# Patient Record
Sex: FEMALE | Race: WHITE | URBAN - METROPOLITAN AREA
[De-identification: names, ages, dates, MRNs, and addresses within clinical notes are randomized per-mention and may not be internally consistent; named-entity substitution may affect disease eponyms.]

---

## 2020-08-24 ENCOUNTER — RECORDS - HEALTHEAST (OUTPATIENT)
Dept: LAB | Facility: CLINIC | Age: 70
End: 2020-08-24

## 2020-08-25 LAB
ANION GAP SERPL CALCULATED.3IONS-SCNC: 12 MMOL/L (ref 5–18)
BUN SERPL-MCNC: 39 MG/DL (ref 8–28)
CALCIUM SERPL-MCNC: 8.6 MG/DL (ref 8.5–10.5)
CHLORIDE BLD-SCNC: 105 MMOL/L (ref 98–107)
CO2 SERPL-SCNC: 19 MMOL/L (ref 22–31)
CREAT SERPL-MCNC: 1.1 MG/DL (ref 0.6–1.1)
ERYTHROCYTE [DISTWIDTH] IN BLOOD BY AUTOMATED COUNT: 13.9 % (ref 11–14.5)
GFR SERPL CREATININE-BSD FRML MDRD: 49 ML/MIN/1.73M2
GLUCOSE BLD-MCNC: 193 MG/DL (ref 70–125)
HCT VFR BLD AUTO: 36.5 % (ref 35–47)
HGB BLD-MCNC: 12.1 G/DL (ref 12–16)
MCH RBC QN AUTO: 30.9 PG (ref 27–34)
MCHC RBC AUTO-ENTMCNC: 33.2 G/DL (ref 32–36)
MCV RBC AUTO: 93 FL (ref 80–100)
PLATELET # BLD AUTO: 186 THOU/UL (ref 140–440)
PMV BLD AUTO: 11.5 FL (ref 8.5–12.5)
POTASSIUM BLD-SCNC: 4 MMOL/L (ref 3.5–5)
RBC # BLD AUTO: 3.92 MILL/UL (ref 3.8–5.4)
SODIUM SERPL-SCNC: 136 MMOL/L (ref 136–145)
WBC: 10.4 THOU/UL (ref 4–11)

## 2020-09-09 ENCOUNTER — RECORDS - HEALTHEAST (OUTPATIENT)
Dept: LAB | Facility: CLINIC | Age: 70
End: 2020-09-09

## 2020-09-10 LAB
ANION GAP SERPL CALCULATED.3IONS-SCNC: 11 MMOL/L (ref 5–18)
BUN SERPL-MCNC: 43 MG/DL (ref 8–28)
CALCIUM SERPL-MCNC: 9.1 MG/DL (ref 8.5–10.5)
CHLORIDE BLD-SCNC: 108 MMOL/L (ref 98–107)
CO2 SERPL-SCNC: 18 MMOL/L (ref 22–31)
CREAT SERPL-MCNC: 1.09 MG/DL (ref 0.6–1.1)
ERYTHROCYTE [DISTWIDTH] IN BLOOD BY AUTOMATED COUNT: 14.6 % (ref 11–14.5)
GFR SERPL CREATININE-BSD FRML MDRD: 49 ML/MIN/1.73M2
GLUCOSE BLD-MCNC: 130 MG/DL (ref 70–125)
HCT VFR BLD AUTO: 34.8 % (ref 35–47)
HGB BLD-MCNC: 11.3 G/DL (ref 12–16)
MCH RBC QN AUTO: 31.4 PG (ref 27–34)
MCHC RBC AUTO-ENTMCNC: 32.5 G/DL (ref 32–36)
MCV RBC AUTO: 97 FL (ref 80–100)
PLATELET # BLD AUTO: 178 THOU/UL (ref 140–440)
PMV BLD AUTO: 11.2 FL (ref 8.5–12.5)
POTASSIUM BLD-SCNC: 4.6 MMOL/L (ref 3.5–5)
RBC # BLD AUTO: 3.6 MILL/UL (ref 3.8–5.4)
SODIUM SERPL-SCNC: 137 MMOL/L (ref 136–145)
WBC: 6.2 THOU/UL (ref 4–11)

## 2020-09-16 ENCOUNTER — RECORDS - HEALTHEAST (OUTPATIENT)
Dept: LAB | Facility: CLINIC | Age: 70
End: 2020-09-16

## 2020-09-17 LAB
ANION GAP SERPL CALCULATED.3IONS-SCNC: 9 MMOL/L (ref 5–18)
BUN SERPL-MCNC: 35 MG/DL (ref 8–28)
CALCIUM SERPL-MCNC: 8.4 MG/DL (ref 8.5–10.5)
CHLORIDE BLD-SCNC: 110 MMOL/L (ref 98–107)
CO2 SERPL-SCNC: 22 MMOL/L (ref 22–31)
CREAT SERPL-MCNC: 0.79 MG/DL (ref 0.6–1.1)
GFR SERPL CREATININE-BSD FRML MDRD: >60 ML/MIN/1.73M2
GLUCOSE BLD-MCNC: 100 MG/DL (ref 70–125)
HGB BLD-MCNC: 9.4 G/DL (ref 12–16)
PLATELET # BLD AUTO: 152 THOU/UL (ref 140–440)
POTASSIUM BLD-SCNC: 4.3 MMOL/L (ref 3.5–5)
SODIUM SERPL-SCNC: 141 MMOL/L (ref 136–145)

## 2020-09-23 ENCOUNTER — RECORDS - HEALTHEAST (OUTPATIENT)
Dept: LAB | Facility: CLINIC | Age: 70
End: 2020-09-23

## 2020-09-24 LAB
ANION GAP SERPL CALCULATED.3IONS-SCNC: 12 MMOL/L (ref 5–18)
BUN SERPL-MCNC: 21 MG/DL (ref 8–28)
CALCIUM SERPL-MCNC: 8.7 MG/DL (ref 8.5–10.5)
CHLORIDE BLD-SCNC: 107 MMOL/L (ref 98–107)
CO2 SERPL-SCNC: 21 MMOL/L (ref 22–31)
CREAT SERPL-MCNC: 1.08 MG/DL (ref 0.6–1.1)
GFR SERPL CREATININE-BSD FRML MDRD: 50 ML/MIN/1.73M2
GLUCOSE BLD-MCNC: 123 MG/DL (ref 70–125)
HGB BLD-MCNC: 10.4 G/DL (ref 12–16)
POTASSIUM BLD-SCNC: 4.2 MMOL/L (ref 3.5–5)
SODIUM SERPL-SCNC: 140 MMOL/L (ref 136–145)

## 2022-07-13 ENCOUNTER — LAB REQUISITION (OUTPATIENT)
Dept: LAB | Facility: CLINIC | Age: 72
End: 2022-07-13
Payer: COMMERCIAL

## 2022-07-13 DIAGNOSIS — E11.69 TYPE 2 DIABETES MELLITUS WITH OTHER SPECIFIED COMPLICATION (H): ICD-10-CM

## 2022-07-13 DIAGNOSIS — M17.0 BILATERAL PRIMARY OSTEOARTHRITIS OF KNEE: ICD-10-CM

## 2022-07-14 LAB
ANION GAP SERPL CALCULATED.3IONS-SCNC: 17 MMOL/L (ref 7–15)
BUN SERPL-MCNC: 37.3 MG/DL (ref 8–23)
CALCIUM SERPL-MCNC: 8.9 MG/DL (ref 8.8–10.2)
CHLORIDE SERPL-SCNC: 101 MMOL/L (ref 98–107)
CREAT SERPL-MCNC: 0.92 MG/DL (ref 0.51–0.95)
DEPRECATED HCO3 PLAS-SCNC: 20 MMOL/L (ref 22–29)
GFR SERPL CREATININE-BSD FRML MDRD: 65 ML/MIN/1.73M2
GLUCOSE SERPL-MCNC: 168 MG/DL (ref 70–99)
HBA1C MFR BLD: 7.3 %
NT-PROBNP SERPL-MCNC: 39 PG/ML (ref 0–125)
POTASSIUM SERPL-SCNC: 4.1 MMOL/L (ref 3.4–5.3)
SODIUM SERPL-SCNC: 138 MMOL/L (ref 136–145)
URATE SERPL-MCNC: 6.7 MG/DL (ref 2.4–5.7)

## 2022-07-14 PROCEDURE — 36415 COLL VENOUS BLD VENIPUNCTURE: CPT | Performed by: FAMILY MEDICINE

## 2022-07-14 PROCEDURE — 83880 ASSAY OF NATRIURETIC PEPTIDE: CPT | Performed by: FAMILY MEDICINE

## 2022-07-14 PROCEDURE — P9604 ONE-WAY ALLOW PRORATED TRIP: HCPCS | Performed by: FAMILY MEDICINE

## 2022-07-14 PROCEDURE — 84550 ASSAY OF BLOOD/URIC ACID: CPT | Performed by: FAMILY MEDICINE

## 2022-07-14 PROCEDURE — 83036 HEMOGLOBIN GLYCOSYLATED A1C: CPT | Performed by: FAMILY MEDICINE

## 2022-07-14 PROCEDURE — 82310 ASSAY OF CALCIUM: CPT | Performed by: FAMILY MEDICINE

## 2022-11-12 ENCOUNTER — LAB REQUISITION (OUTPATIENT)
Dept: LAB | Facility: CLINIC | Age: 72
End: 2022-11-12
Payer: COMMERCIAL

## 2022-11-12 DIAGNOSIS — I10 ESSENTIAL (PRIMARY) HYPERTENSION: ICD-10-CM

## 2022-11-14 LAB
ANION GAP SERPL CALCULATED.3IONS-SCNC: 16 MMOL/L (ref 7–15)
BUN SERPL-MCNC: 33.7 MG/DL (ref 8–23)
CALCIUM SERPL-MCNC: 9.1 MG/DL (ref 8.8–10.2)
CHLORIDE SERPL-SCNC: 99 MMOL/L (ref 98–107)
CREAT SERPL-MCNC: 1.02 MG/DL (ref 0.51–0.95)
DEPRECATED HCO3 PLAS-SCNC: 21 MMOL/L (ref 22–29)
ERYTHROCYTE [DISTWIDTH] IN BLOOD BY AUTOMATED COUNT: 13.5 % (ref 10–15)
GFR SERPL CREATININE-BSD FRML MDRD: 58 ML/MIN/1.73M2
GLUCOSE SERPL-MCNC: 268 MG/DL (ref 70–99)
HBA1C MFR BLD: 7.6 %
HCT VFR BLD AUTO: 38.2 % (ref 35–47)
HGB BLD-MCNC: 12 G/DL (ref 11.7–15.7)
MCH RBC QN AUTO: 30.9 PG (ref 26.5–33)
MCHC RBC AUTO-ENTMCNC: 31.4 G/DL (ref 31.5–36.5)
MCV RBC AUTO: 99 FL (ref 78–100)
PLATELET # BLD AUTO: 188 10E3/UL (ref 150–450)
POTASSIUM SERPL-SCNC: 4.7 MMOL/L (ref 3.4–5.3)
RBC # BLD AUTO: 3.88 10E6/UL (ref 3.8–5.2)
SODIUM SERPL-SCNC: 136 MMOL/L (ref 136–145)
TSH SERPL DL<=0.005 MIU/L-ACNC: 7.18 UIU/ML (ref 0.3–4.2)
WBC # BLD AUTO: 10.2 10E3/UL (ref 4–11)

## 2022-11-14 PROCEDURE — P9604 ONE-WAY ALLOW PRORATED TRIP: HCPCS | Mod: ORL | Performed by: NURSE PRACTITIONER

## 2022-11-14 PROCEDURE — 80048 BASIC METABOLIC PNL TOTAL CA: CPT | Mod: ORL | Performed by: NURSE PRACTITIONER

## 2022-11-14 PROCEDURE — 84443 ASSAY THYROID STIM HORMONE: CPT | Mod: ORL | Performed by: NURSE PRACTITIONER

## 2022-11-14 PROCEDURE — 36415 COLL VENOUS BLD VENIPUNCTURE: CPT | Mod: ORL | Performed by: NURSE PRACTITIONER

## 2022-11-14 PROCEDURE — 85027 COMPLETE CBC AUTOMATED: CPT | Mod: ORL | Performed by: NURSE PRACTITIONER

## 2022-11-14 PROCEDURE — 83036 HEMOGLOBIN GLYCOSYLATED A1C: CPT | Mod: ORL | Performed by: NURSE PRACTITIONER

## 2023-01-03 ENCOUNTER — LAB REQUISITION (OUTPATIENT)
Dept: LAB | Facility: CLINIC | Age: 73
End: 2023-01-03
Payer: COMMERCIAL

## 2023-01-03 DIAGNOSIS — E03.9 HYPOTHYROIDISM, UNSPECIFIED: ICD-10-CM

## 2023-01-04 LAB — TSH SERPL DL<=0.005 MIU/L-ACNC: 4.79 UIU/ML (ref 0.3–4.2)

## 2023-01-04 PROCEDURE — 84443 ASSAY THYROID STIM HORMONE: CPT | Mod: ORL | Performed by: NURSE PRACTITIONER

## 2023-01-04 PROCEDURE — P9604 ONE-WAY ALLOW PRORATED TRIP: HCPCS | Mod: ORL | Performed by: NURSE PRACTITIONER

## 2023-01-04 PROCEDURE — 36415 COLL VENOUS BLD VENIPUNCTURE: CPT | Mod: ORL | Performed by: NURSE PRACTITIONER

## 2023-01-20 ENCOUNTER — LAB REQUISITION (OUTPATIENT)
Dept: LAB | Facility: CLINIC | Age: 73
End: 2023-01-20
Payer: COMMERCIAL

## 2023-01-20 DIAGNOSIS — I10 ESSENTIAL (PRIMARY) HYPERTENSION: ICD-10-CM

## 2023-01-23 LAB
ANION GAP SERPL CALCULATED.3IONS-SCNC: 16 MMOL/L (ref 7–15)
BUN SERPL-MCNC: 29.7 MG/DL (ref 8–23)
CALCIUM SERPL-MCNC: 9.3 MG/DL (ref 8.8–10.2)
CHLORIDE SERPL-SCNC: 102 MMOL/L (ref 98–107)
CREAT SERPL-MCNC: 0.84 MG/DL (ref 0.51–0.95)
DEPRECATED HCO3 PLAS-SCNC: 20 MMOL/L (ref 22–29)
GFR SERPL CREATININE-BSD FRML MDRD: 73 ML/MIN/1.73M2
GLUCOSE SERPL-MCNC: 186 MG/DL (ref 70–99)
POTASSIUM SERPL-SCNC: 4.6 MMOL/L (ref 3.4–5.3)
SODIUM SERPL-SCNC: 138 MMOL/L (ref 136–145)

## 2023-01-23 PROCEDURE — P9604 ONE-WAY ALLOW PRORATED TRIP: HCPCS | Mod: ORL | Performed by: NURSE PRACTITIONER

## 2023-01-23 PROCEDURE — 80048 BASIC METABOLIC PNL TOTAL CA: CPT | Mod: ORL | Performed by: NURSE PRACTITIONER

## 2023-01-23 PROCEDURE — 36415 COLL VENOUS BLD VENIPUNCTURE: CPT | Mod: ORL | Performed by: NURSE PRACTITIONER

## 2023-03-30 ENCOUNTER — LAB REQUISITION (OUTPATIENT)
Dept: LAB | Facility: CLINIC | Age: 73
End: 2023-03-30
Payer: COMMERCIAL

## 2023-03-30 DIAGNOSIS — R19.7 DIARRHEA, UNSPECIFIED: ICD-10-CM

## 2023-03-30 PROCEDURE — 87798 DETECT AGENT NOS DNA AMP: CPT | Mod: ORL | Performed by: NURSE PRACTITIONER

## 2023-03-30 PROCEDURE — 87493 C DIFF AMPLIFIED PROBE: CPT | Mod: ORL | Performed by: NURSE PRACTITIONER

## 2023-03-31 ENCOUNTER — LAB REQUISITION (OUTPATIENT)
Dept: LAB | Facility: CLINIC | Age: 73
End: 2023-03-31
Payer: COMMERCIAL

## 2023-03-31 DIAGNOSIS — R19.7 DIARRHEA, UNSPECIFIED: ICD-10-CM

## 2023-03-31 LAB
C DIFF TOX B STL QL: NEGATIVE
NOROV GI+II ORF1-ORF2 JNC STL QL NAA+PR: NOT DETECTED

## 2023-04-03 LAB
ANION GAP SERPL CALCULATED.3IONS-SCNC: 14 MMOL/L (ref 7–15)
BUN SERPL-MCNC: 29.9 MG/DL (ref 8–23)
CALCIUM SERPL-MCNC: 9 MG/DL (ref 8.8–10.2)
CHLORIDE SERPL-SCNC: 106 MMOL/L (ref 98–107)
CREAT SERPL-MCNC: 0.97 MG/DL (ref 0.51–0.95)
DEPRECATED HCO3 PLAS-SCNC: 21 MMOL/L (ref 22–29)
ERYTHROCYTE [DISTWIDTH] IN BLOOD BY AUTOMATED COUNT: 13.9 % (ref 10–15)
GFR SERPL CREATININE-BSD FRML MDRD: 61 ML/MIN/1.73M2
GLUCOSE SERPL-MCNC: 233 MG/DL (ref 70–99)
HCT VFR BLD AUTO: 37.5 % (ref 35–47)
HGB BLD-MCNC: 12.1 G/DL (ref 11.7–15.7)
MCH RBC QN AUTO: 30.9 PG (ref 26.5–33)
MCHC RBC AUTO-ENTMCNC: 32.3 G/DL (ref 31.5–36.5)
MCV RBC AUTO: 96 FL (ref 78–100)
PLATELET # BLD AUTO: 188 10E3/UL (ref 150–450)
POTASSIUM SERPL-SCNC: 4.4 MMOL/L (ref 3.4–5.3)
RBC # BLD AUTO: 3.91 10E6/UL (ref 3.8–5.2)
SODIUM SERPL-SCNC: 141 MMOL/L (ref 136–145)
WBC # BLD AUTO: 8.4 10E3/UL (ref 4–11)

## 2023-04-03 PROCEDURE — 36415 COLL VENOUS BLD VENIPUNCTURE: CPT | Mod: ORL | Performed by: NURSE PRACTITIONER

## 2023-04-03 PROCEDURE — P9604 ONE-WAY ALLOW PRORATED TRIP: HCPCS | Mod: ORL | Performed by: NURSE PRACTITIONER

## 2023-04-03 PROCEDURE — 80048 BASIC METABOLIC PNL TOTAL CA: CPT | Mod: ORL | Performed by: NURSE PRACTITIONER

## 2023-04-03 PROCEDURE — 85027 COMPLETE CBC AUTOMATED: CPT | Mod: ORL | Performed by: NURSE PRACTITIONER

## 2023-07-21 ENCOUNTER — LAB REQUISITION (OUTPATIENT)
Dept: LAB | Facility: CLINIC | Age: 73
End: 2023-07-21
Payer: COMMERCIAL

## 2023-07-21 DIAGNOSIS — E11.9 TYPE 2 DIABETES MELLITUS WITHOUT COMPLICATIONS (H): ICD-10-CM

## 2023-07-24 LAB — HBA1C MFR BLD: 8.6 %

## 2023-07-24 PROCEDURE — 83036 HEMOGLOBIN GLYCOSYLATED A1C: CPT | Mod: ORL | Performed by: NURSE PRACTITIONER

## 2023-07-24 PROCEDURE — 36415 COLL VENOUS BLD VENIPUNCTURE: CPT | Mod: ORL | Performed by: NURSE PRACTITIONER

## 2023-07-24 PROCEDURE — P9604 ONE-WAY ALLOW PRORATED TRIP: HCPCS | Mod: ORL | Performed by: NURSE PRACTITIONER

## 2023-12-20 ENCOUNTER — LAB REQUISITION (OUTPATIENT)
Dept: LAB | Facility: CLINIC | Age: 73
End: 2023-12-20
Payer: COMMERCIAL

## 2023-12-20 DIAGNOSIS — E11.9 TYPE 2 DIABETES MELLITUS WITHOUT COMPLICATIONS (H): ICD-10-CM

## 2023-12-20 DIAGNOSIS — I10 ESSENTIAL (PRIMARY) HYPERTENSION: ICD-10-CM

## 2023-12-21 LAB
ANION GAP SERPL CALCULATED.3IONS-SCNC: 11 MMOL/L (ref 7–15)
BUN SERPL-MCNC: 43.1 MG/DL (ref 8–23)
CALCIUM SERPL-MCNC: 9.3 MG/DL (ref 8.8–10.2)
CHLORIDE SERPL-SCNC: 106 MMOL/L (ref 98–107)
CREAT SERPL-MCNC: 1 MG/DL (ref 0.51–0.95)
DEPRECATED HCO3 PLAS-SCNC: 22 MMOL/L (ref 22–29)
EGFRCR SERPLBLD CKD-EPI 2021: 59 ML/MIN/1.73M2
ERYTHROCYTE [DISTWIDTH] IN BLOOD BY AUTOMATED COUNT: 14.4 % (ref 10–15)
GLUCOSE SERPL-MCNC: 191 MG/DL (ref 70–99)
HBA1C MFR BLD: 9.4 %
HCT VFR BLD AUTO: 34.6 % (ref 35–47)
HGB BLD-MCNC: 11 G/DL (ref 11.7–15.7)
MCH RBC QN AUTO: 31.6 PG (ref 26.5–33)
MCHC RBC AUTO-ENTMCNC: 31.8 G/DL (ref 31.5–36.5)
MCV RBC AUTO: 99 FL (ref 78–100)
NT-PROBNP SERPL-MCNC: 96 PG/ML (ref 0–900)
PLATELET # BLD AUTO: 175 10E3/UL (ref 150–450)
POTASSIUM SERPL-SCNC: 5.2 MMOL/L (ref 3.4–5.3)
RBC # BLD AUTO: 3.48 10E6/UL (ref 3.8–5.2)
SODIUM SERPL-SCNC: 139 MMOL/L (ref 135–145)
URATE SERPL-MCNC: 6.3 MG/DL (ref 2.4–5.7)
WBC # BLD AUTO: 8.2 10E3/UL (ref 4–11)

## 2023-12-21 PROCEDURE — 80048 BASIC METABOLIC PNL TOTAL CA: CPT | Mod: ORL | Performed by: NURSE PRACTITIONER

## 2023-12-21 PROCEDURE — 83880 ASSAY OF NATRIURETIC PEPTIDE: CPT | Mod: ORL | Performed by: NURSE PRACTITIONER

## 2023-12-21 PROCEDURE — 85027 COMPLETE CBC AUTOMATED: CPT | Mod: ORL | Performed by: NURSE PRACTITIONER

## 2023-12-21 PROCEDURE — 36415 COLL VENOUS BLD VENIPUNCTURE: CPT | Mod: ORL | Performed by: NURSE PRACTITIONER

## 2023-12-21 PROCEDURE — P9604 ONE-WAY ALLOW PRORATED TRIP: HCPCS | Mod: ORL | Performed by: NURSE PRACTITIONER

## 2023-12-21 PROCEDURE — 83036 HEMOGLOBIN GLYCOSYLATED A1C: CPT | Mod: ORL | Performed by: NURSE PRACTITIONER

## 2023-12-21 PROCEDURE — 84550 ASSAY OF BLOOD/URIC ACID: CPT | Mod: ORL | Performed by: NURSE PRACTITIONER

## 2024-01-01 ENCOUNTER — APPOINTMENT (OUTPATIENT)
Dept: SPEECH THERAPY | Facility: CLINIC | Age: 74
DRG: 280 | End: 2024-01-01
Attending: INTERNAL MEDICINE
Payer: COMMERCIAL

## 2024-01-01 ENCOUNTER — APPOINTMENT (OUTPATIENT)
Dept: CT IMAGING | Facility: CLINIC | Age: 74
DRG: 280 | End: 2024-01-01
Attending: INTERNAL MEDICINE
Payer: COMMERCIAL

## 2024-01-01 ENCOUNTER — APPOINTMENT (OUTPATIENT)
Dept: MRI IMAGING | Facility: CLINIC | Age: 74
DRG: 280 | End: 2024-01-01
Attending: PSYCHIATRY & NEUROLOGY
Payer: COMMERCIAL

## 2024-01-01 ENCOUNTER — APPOINTMENT (OUTPATIENT)
Dept: NUCLEAR MEDICINE | Facility: CLINIC | Age: 74
DRG: 280 | End: 2024-01-01
Attending: INTERNAL MEDICINE
Payer: COMMERCIAL

## 2024-01-01 ENCOUNTER — APPOINTMENT (OUTPATIENT)
Dept: SPEECH THERAPY | Facility: CLINIC | Age: 74
DRG: 280 | End: 2024-01-01
Attending: PSYCHIATRY & NEUROLOGY
Payer: COMMERCIAL

## 2024-01-01 ENCOUNTER — APPOINTMENT (OUTPATIENT)
Dept: GENERAL RADIOLOGY | Facility: CLINIC | Age: 74
DRG: 280 | End: 2024-01-01
Attending: INTERNAL MEDICINE
Payer: COMMERCIAL

## 2024-01-01 ENCOUNTER — APPOINTMENT (OUTPATIENT)
Dept: GENERAL RADIOLOGY | Facility: CLINIC | Age: 74
DRG: 280 | End: 2024-01-01
Attending: EMERGENCY MEDICINE
Payer: COMMERCIAL

## 2024-01-01 ENCOUNTER — MEDICAL CORRESPONDENCE (OUTPATIENT)
Dept: HEALTH INFORMATION MANAGEMENT | Facility: CLINIC | Age: 74
End: 2024-01-01
Payer: COMMERCIAL

## 2024-01-01 PROCEDURE — 70450 CT HEAD/BRAIN W/O DYE: CPT

## 2024-01-01 PROCEDURE — 71045 X-RAY EXAM CHEST 1 VIEW: CPT

## 2024-01-01 PROCEDURE — 71046 X-RAY EXAM CHEST 2 VIEWS: CPT

## 2024-01-01 PROCEDURE — 70551 MRI BRAIN STEM W/O DYE: CPT

## 2024-04-19 ENCOUNTER — LAB REQUISITION (OUTPATIENT)
Dept: LAB | Facility: CLINIC | Age: 74
End: 2024-04-19
Payer: COMMERCIAL

## 2024-04-19 DIAGNOSIS — I50.9 HEART FAILURE, UNSPECIFIED (H): ICD-10-CM

## 2024-04-22 LAB
ANION GAP SERPL CALCULATED.3IONS-SCNC: 14 MMOL/L (ref 7–15)
BASOPHILS # BLD AUTO: ABNORMAL 10*3/UL
BASOPHILS # BLD MANUAL: 0 10E3/UL (ref 0–0.2)
BASOPHILS NFR BLD AUTO: ABNORMAL %
BASOPHILS NFR BLD MANUAL: 0 %
BUN SERPL-MCNC: 42.8 MG/DL (ref 8–23)
BURR CELLS BLD QL SMEAR: ABNORMAL
CALCIUM SERPL-MCNC: 9.1 MG/DL (ref 8.8–10.2)
CHLORIDE SERPL-SCNC: 106 MMOL/L (ref 98–107)
CREAT SERPL-MCNC: 0.91 MG/DL (ref 0.51–0.95)
DEPRECATED HCO3 PLAS-SCNC: 20 MMOL/L (ref 22–29)
EGFRCR SERPLBLD CKD-EPI 2021: 65 ML/MIN/1.73M2
EOSINOPHIL # BLD AUTO: ABNORMAL 10*3/UL
EOSINOPHIL # BLD MANUAL: 0.3 10E3/UL (ref 0–0.7)
EOSINOPHIL NFR BLD AUTO: ABNORMAL %
EOSINOPHIL NFR BLD MANUAL: 4 %
ERYTHROCYTE [DISTWIDTH] IN BLOOD BY AUTOMATED COUNT: 13.9 % (ref 10–15)
GLUCOSE SERPL-MCNC: 330 MG/DL (ref 70–99)
HCT VFR BLD AUTO: 36.8 % (ref 35–47)
HGB BLD-MCNC: 11.5 G/DL (ref 11.7–15.7)
IMM GRANULOCYTES # BLD: ABNORMAL 10*3/UL
IMM GRANULOCYTES NFR BLD: ABNORMAL %
LYMPHOCYTES # BLD AUTO: ABNORMAL 10*3/UL
LYMPHOCYTES # BLD MANUAL: 1.8 10E3/UL (ref 0.8–5.3)
LYMPHOCYTES NFR BLD AUTO: ABNORMAL %
LYMPHOCYTES NFR BLD MANUAL: 21 %
MCH RBC QN AUTO: 32.1 PG (ref 26.5–33)
MCHC RBC AUTO-ENTMCNC: 31.3 G/DL (ref 31.5–36.5)
MCV RBC AUTO: 103 FL (ref 78–100)
METAMYELOCYTES # BLD MANUAL: 0.1 10E3/UL
METAMYELOCYTES NFR BLD MANUAL: 1 %
MONOCYTES # BLD AUTO: ABNORMAL 10*3/UL
MONOCYTES # BLD MANUAL: 0.2 10E3/UL (ref 0–1.3)
MONOCYTES NFR BLD AUTO: ABNORMAL %
MONOCYTES NFR BLD MANUAL: 2 %
NEUTROPHILS # BLD AUTO: ABNORMAL 10*3/UL
NEUTROPHILS # BLD MANUAL: 6.3 10E3/UL (ref 1.6–8.3)
NEUTROPHILS NFR BLD AUTO: ABNORMAL %
NEUTROPHILS NFR BLD MANUAL: 72 %
NRBC # BLD AUTO: 0 10E3/UL
NRBC BLD AUTO-RTO: 0 /100
NT-PROBNP SERPL-MCNC: 90 PG/ML (ref 0–900)
PLAT MORPH BLD: ABNORMAL
PLATELET # BLD AUTO: 219 10E3/UL (ref 150–450)
POLYCHROMASIA BLD QL SMEAR: ABNORMAL
POTASSIUM SERPL-SCNC: 5.5 MMOL/L (ref 3.4–5.3)
RBC # BLD AUTO: 3.58 10E6/UL (ref 3.8–5.2)
RBC MORPH BLD: ABNORMAL
SODIUM SERPL-SCNC: 140 MMOL/L (ref 135–145)
TARGETS BLD QL SMEAR: ABNORMAL
WBC # BLD AUTO: 8.7 10E3/UL (ref 4–11)

## 2024-04-22 PROCEDURE — 83880 ASSAY OF NATRIURETIC PEPTIDE: CPT | Mod: ORL | Performed by: NURSE PRACTITIONER

## 2024-04-22 PROCEDURE — 36415 COLL VENOUS BLD VENIPUNCTURE: CPT | Mod: ORL | Performed by: NURSE PRACTITIONER

## 2024-04-22 PROCEDURE — 80048 BASIC METABOLIC PNL TOTAL CA: CPT | Mod: ORL | Performed by: NURSE PRACTITIONER

## 2024-04-22 PROCEDURE — P9604 ONE-WAY ALLOW PRORATED TRIP: HCPCS | Mod: ORL | Performed by: NURSE PRACTITIONER

## 2024-04-22 PROCEDURE — 85007 BL SMEAR W/DIFF WBC COUNT: CPT | Mod: ORL | Performed by: NURSE PRACTITIONER

## 2024-04-22 PROCEDURE — 85027 COMPLETE CBC AUTOMATED: CPT | Mod: ORL | Performed by: NURSE PRACTITIONER

## 2024-05-09 ENCOUNTER — LAB REQUISITION (OUTPATIENT)
Dept: LAB | Facility: CLINIC | Age: 74
End: 2024-05-09
Payer: COMMERCIAL

## 2024-05-09 DIAGNOSIS — F02.A11 DEMENTIA IN OTHER DISEASES CLASSIFIED ELSEWHERE, MILD, WITH AGITATION (H): ICD-10-CM

## 2024-05-09 DIAGNOSIS — E87.5 HYPERKALEMIA: ICD-10-CM

## 2024-05-09 DIAGNOSIS — E03.9 HYPOTHYROIDISM, UNSPECIFIED: ICD-10-CM

## 2024-05-13 LAB
ANION GAP SERPL CALCULATED.3IONS-SCNC: 14 MMOL/L (ref 7–15)
BUN SERPL-MCNC: 42.9 MG/DL (ref 8–23)
CALCIUM SERPL-MCNC: 9.4 MG/DL (ref 8.8–10.2)
CHLORIDE SERPL-SCNC: 104 MMOL/L (ref 98–107)
CREAT SERPL-MCNC: 0.74 MG/DL (ref 0.51–0.95)
DEPRECATED HCO3 PLAS-SCNC: 18 MMOL/L (ref 22–29)
EGFRCR SERPLBLD CKD-EPI 2021: 84 ML/MIN/1.73M2
FOLATE SERPL-MCNC: 9 NG/ML (ref 4.6–34.8)
GLUCOSE SERPL-MCNC: 186 MG/DL (ref 70–99)
POTASSIUM SERPL-SCNC: 5.7 MMOL/L (ref 3.4–5.3)
SODIUM SERPL-SCNC: 136 MMOL/L (ref 135–145)
TSH SERPL DL<=0.005 MIU/L-ACNC: 5.09 UIU/ML (ref 0.3–4.2)
VIT B12 SERPL-MCNC: 295 PG/ML (ref 232–1245)

## 2024-05-13 PROCEDURE — P9604 ONE-WAY ALLOW PRORATED TRIP: HCPCS | Mod: ORL | Performed by: NURSE PRACTITIONER

## 2024-05-13 PROCEDURE — 80048 BASIC METABOLIC PNL TOTAL CA: CPT | Mod: ORL | Performed by: NURSE PRACTITIONER

## 2024-05-13 PROCEDURE — 36415 COLL VENOUS BLD VENIPUNCTURE: CPT | Mod: ORL | Performed by: NURSE PRACTITIONER

## 2024-05-13 PROCEDURE — 82746 ASSAY OF FOLIC ACID SERUM: CPT | Mod: ORL | Performed by: NURSE PRACTITIONER

## 2024-05-13 PROCEDURE — 82607 VITAMIN B-12: CPT | Mod: ORL | Performed by: NURSE PRACTITIONER

## 2024-05-13 PROCEDURE — 84443 ASSAY THYROID STIM HORMONE: CPT | Mod: ORL | Performed by: NURSE PRACTITIONER

## 2024-05-17 ENCOUNTER — LAB REQUISITION (OUTPATIENT)
Dept: LAB | Facility: CLINIC | Age: 74
End: 2024-05-17
Payer: COMMERCIAL

## 2024-05-17 DIAGNOSIS — E11.9 TYPE 2 DIABETES MELLITUS WITHOUT COMPLICATIONS (H): ICD-10-CM

## 2024-05-17 DIAGNOSIS — E03.9 HYPOTHYROIDISM, UNSPECIFIED: ICD-10-CM

## 2024-05-17 DIAGNOSIS — I10 ESSENTIAL (PRIMARY) HYPERTENSION: ICD-10-CM

## 2024-05-20 LAB
ANION GAP SERPL CALCULATED.3IONS-SCNC: 14 MMOL/L (ref 7–15)
BUN SERPL-MCNC: 32.2 MG/DL (ref 8–23)
CALCIUM SERPL-MCNC: 9.2 MG/DL (ref 8.8–10.2)
CHLORIDE SERPL-SCNC: 105 MMOL/L (ref 98–107)
CREAT SERPL-MCNC: 0.77 MG/DL (ref 0.51–0.95)
DEPRECATED HCO3 PLAS-SCNC: 21 MMOL/L (ref 22–29)
EGFRCR SERPLBLD CKD-EPI 2021: 80 ML/MIN/1.73M2
GLUCOSE SERPL-MCNC: 158 MG/DL (ref 70–99)
POTASSIUM SERPL-SCNC: 5 MMOL/L (ref 3.4–5.3)
SODIUM SERPL-SCNC: 140 MMOL/L (ref 135–145)
T4 FREE SERPL-MCNC: 1.16 NG/DL (ref 0.9–1.7)
TSH SERPL DL<=0.005 MIU/L-ACNC: 4.49 UIU/ML (ref 0.3–4.2)

## 2024-05-20 PROCEDURE — 84439 ASSAY OF FREE THYROXINE: CPT | Mod: ORL | Performed by: NURSE PRACTITIONER

## 2024-05-20 PROCEDURE — 84443 ASSAY THYROID STIM HORMONE: CPT | Mod: ORL | Performed by: NURSE PRACTITIONER

## 2024-05-20 PROCEDURE — P9604 ONE-WAY ALLOW PRORATED TRIP: HCPCS | Mod: ORL | Performed by: NURSE PRACTITIONER

## 2024-05-20 PROCEDURE — 80048 BASIC METABOLIC PNL TOTAL CA: CPT | Mod: ORL | Performed by: NURSE PRACTITIONER

## 2024-05-20 PROCEDURE — 36415 COLL VENOUS BLD VENIPUNCTURE: CPT | Mod: ORL | Performed by: NURSE PRACTITIONER

## 2024-05-21 ENCOUNTER — LAB REQUISITION (OUTPATIENT)
Dept: LAB | Facility: CLINIC | Age: 74
End: 2024-05-21
Payer: COMMERCIAL

## 2024-05-21 DIAGNOSIS — R30.0 DYSURIA: ICD-10-CM

## 2024-05-21 DIAGNOSIS — E11.9 TYPE 2 DIABETES MELLITUS WITHOUT COMPLICATIONS (H): ICD-10-CM

## 2024-05-21 LAB
ALBUMIN UR-MCNC: NEGATIVE MG/DL
APPEARANCE UR: ABNORMAL
BACTERIA #/AREA URNS HPF: ABNORMAL /HPF
BILIRUB UR QL STRIP: NEGATIVE
COLOR UR AUTO: ABNORMAL
GLUCOSE UR STRIP-MCNC: NEGATIVE MG/DL
GRANULAR CAST: 3 /LPF
HGB UR QL STRIP: NEGATIVE
KETONES UR STRIP-MCNC: NEGATIVE MG/DL
LEUKOCYTE ESTERASE UR QL STRIP: ABNORMAL
MUCOUS THREADS #/AREA URNS LPF: PRESENT /LPF
NITRATE UR QL: NEGATIVE
PH UR STRIP: 5 [PH] (ref 5–7)
RBC URINE: <1 /HPF
SP GR UR STRIP: 1.01 (ref 1–1.03)
SQUAMOUS EPITHELIAL: <1 /HPF
UROBILINOGEN UR STRIP-MCNC: NORMAL MG/DL
WBC CLUMPS #/AREA URNS HPF: PRESENT /HPF
WBC URINE: 55 /HPF

## 2024-05-21 PROCEDURE — 87186 SC STD MICRODIL/AGAR DIL: CPT | Mod: ORL | Performed by: INTERNAL MEDICINE

## 2024-05-21 PROCEDURE — 81001 URINALYSIS AUTO W/SCOPE: CPT | Mod: ORL | Performed by: INTERNAL MEDICINE

## 2024-05-21 PROCEDURE — 87086 URINE CULTURE/COLONY COUNT: CPT | Mod: ORL | Performed by: INTERNAL MEDICINE

## 2024-05-22 ENCOUNTER — LAB REQUISITION (OUTPATIENT)
Dept: LAB | Facility: CLINIC | Age: 74
End: 2024-05-22
Payer: COMMERCIAL

## 2024-05-22 DIAGNOSIS — I10 ESSENTIAL (PRIMARY) HYPERTENSION: ICD-10-CM

## 2024-05-22 LAB — HBA1C MFR BLD: 9 %

## 2024-05-22 PROCEDURE — 36415 COLL VENOUS BLD VENIPUNCTURE: CPT | Mod: ORL | Performed by: NURSE PRACTITIONER

## 2024-05-22 PROCEDURE — 83036 HEMOGLOBIN GLYCOSYLATED A1C: CPT | Mod: ORL | Performed by: NURSE PRACTITIONER

## 2024-05-22 PROCEDURE — P9604 ONE-WAY ALLOW PRORATED TRIP: HCPCS | Mod: ORL | Performed by: NURSE PRACTITIONER

## 2024-05-23 LAB
BACTERIA UR CULT: ABNORMAL
BACTERIA UR CULT: ABNORMAL

## 2024-05-24 ENCOUNTER — LAB REQUISITION (OUTPATIENT)
Dept: LAB | Facility: CLINIC | Age: 74
End: 2024-05-24
Payer: COMMERCIAL

## 2024-05-24 DIAGNOSIS — I10 ESSENTIAL (PRIMARY) HYPERTENSION: ICD-10-CM

## 2024-05-24 PROCEDURE — 80048 BASIC METABOLIC PNL TOTAL CA: CPT | Mod: ORL | Performed by: NURSE PRACTITIONER

## 2024-05-24 PROCEDURE — P9604 ONE-WAY ALLOW PRORATED TRIP: HCPCS | Mod: ORL | Performed by: NURSE PRACTITIONER

## 2024-05-24 PROCEDURE — 36415 COLL VENOUS BLD VENIPUNCTURE: CPT | Mod: ORL | Performed by: NURSE PRACTITIONER

## 2024-05-25 LAB
ANION GAP SERPL CALCULATED.3IONS-SCNC: 19 MMOL/L (ref 7–15)
BUN SERPL-MCNC: 23.9 MG/DL (ref 8–23)
CALCIUM SERPL-MCNC: 8.7 MG/DL (ref 8.8–10.2)
CHLORIDE SERPL-SCNC: 101 MMOL/L (ref 98–107)
CREAT SERPL-MCNC: 0.67 MG/DL (ref 0.51–0.95)
DEPRECATED HCO3 PLAS-SCNC: 18 MMOL/L (ref 22–29)
EGFRCR SERPLBLD CKD-EPI 2021: >90 ML/MIN/1.73M2
GLUCOSE SERPL-MCNC: 234 MG/DL (ref 70–99)
POTASSIUM SERPL-SCNC: 3.9 MMOL/L (ref 3.4–5.3)
SODIUM SERPL-SCNC: 138 MMOL/L (ref 135–145)

## 2024-08-23 ENCOUNTER — LAB REQUISITION (OUTPATIENT)
Dept: LAB | Facility: CLINIC | Age: 74
End: 2024-08-23
Payer: COMMERCIAL

## 2024-08-23 DIAGNOSIS — E11.9 TYPE 2 DIABETES MELLITUS WITHOUT COMPLICATIONS (H): ICD-10-CM

## 2024-08-26 LAB — HBA1C MFR BLD: 7.4 %

## 2024-08-26 PROCEDURE — P9604 ONE-WAY ALLOW PRORATED TRIP: HCPCS | Mod: ORL | Performed by: NURSE PRACTITIONER

## 2024-08-26 PROCEDURE — 36415 COLL VENOUS BLD VENIPUNCTURE: CPT | Mod: ORL | Performed by: NURSE PRACTITIONER

## 2024-08-26 PROCEDURE — 83036 HEMOGLOBIN GLYCOSYLATED A1C: CPT | Mod: ORL | Performed by: NURSE PRACTITIONER

## 2024-10-02 ENCOUNTER — LAB REQUISITION (OUTPATIENT)
Dept: LAB | Facility: CLINIC | Age: 74
End: 2024-10-02
Payer: COMMERCIAL

## 2024-10-02 DIAGNOSIS — I10 ESSENTIAL (PRIMARY) HYPERTENSION: ICD-10-CM

## 2024-10-02 DIAGNOSIS — I50.22 CHRONIC SYSTOLIC (CONGESTIVE) HEART FAILURE (H): ICD-10-CM

## 2024-10-02 DIAGNOSIS — J18.9 PNEUMONIA, UNSPECIFIED ORGANISM: ICD-10-CM

## 2024-10-03 LAB
ANION GAP SERPL CALCULATED.3IONS-SCNC: 12 MMOL/L (ref 7–15)
BUN SERPL-MCNC: 25.3 MG/DL (ref 8–23)
CALCIUM SERPL-MCNC: 8.5 MG/DL (ref 8.8–10.4)
CHLORIDE SERPL-SCNC: 101 MMOL/L (ref 98–107)
CREAT SERPL-MCNC: 0.72 MG/DL (ref 0.51–0.95)
EGFRCR SERPLBLD CKD-EPI 2021: 87 ML/MIN/1.73M2
ERYTHROCYTE [DISTWIDTH] IN BLOOD BY AUTOMATED COUNT: 15 % (ref 10–15)
GLUCOSE SERPL-MCNC: 162 MG/DL (ref 70–99)
HCO3 SERPL-SCNC: 26 MMOL/L (ref 22–29)
HCT VFR BLD AUTO: 36.4 % (ref 35–47)
HGB BLD-MCNC: 11.2 G/DL (ref 11.7–15.7)
MCH RBC QN AUTO: 28.1 PG (ref 26.5–33)
MCHC RBC AUTO-ENTMCNC: 30.8 G/DL (ref 31.5–36.5)
MCV RBC AUTO: 92 FL (ref 78–100)
NT-PROBNP SERPL-MCNC: 516 PG/ML (ref 0–900)
PLATELET # BLD AUTO: 265 10E3/UL (ref 150–450)
POTASSIUM SERPL-SCNC: 3.9 MMOL/L (ref 3.4–5.3)
PROLACTIN SERPL 3RD IS-MCNC: 24 NG/ML (ref 5–23)
RBC # BLD AUTO: 3.98 10E6/UL (ref 3.8–5.2)
SODIUM SERPL-SCNC: 139 MMOL/L (ref 135–145)
WBC # BLD AUTO: 9.1 10E3/UL (ref 4–11)

## 2024-10-03 PROCEDURE — 85027 COMPLETE CBC AUTOMATED: CPT | Mod: ORL | Performed by: NURSE PRACTITIONER

## 2024-10-03 PROCEDURE — 83880 ASSAY OF NATRIURETIC PEPTIDE: CPT | Mod: ORL | Performed by: NURSE PRACTITIONER

## 2024-10-03 PROCEDURE — P9604 ONE-WAY ALLOW PRORATED TRIP: HCPCS | Mod: ORL | Performed by: NURSE PRACTITIONER

## 2024-10-03 PROCEDURE — 36415 COLL VENOUS BLD VENIPUNCTURE: CPT | Mod: ORL | Performed by: NURSE PRACTITIONER

## 2024-10-03 PROCEDURE — 80048 BASIC METABOLIC PNL TOTAL CA: CPT | Mod: ORL | Performed by: NURSE PRACTITIONER

## 2024-10-03 PROCEDURE — 84146 ASSAY OF PROLACTIN: CPT | Mod: ORL | Performed by: NURSE PRACTITIONER

## 2024-10-11 ENCOUNTER — LAB REQUISITION (OUTPATIENT)
Dept: LAB | Facility: CLINIC | Age: 74
End: 2024-10-11
Payer: COMMERCIAL

## 2024-10-11 DIAGNOSIS — J18.9 PNEUMONIA, UNSPECIFIED ORGANISM: ICD-10-CM

## 2024-10-14 LAB
BASOPHILS # BLD AUTO: 0 10E3/UL (ref 0–0.2)
BASOPHILS NFR BLD AUTO: 0 %
EOSINOPHIL # BLD AUTO: 0.4 10E3/UL (ref 0–0.7)
EOSINOPHIL NFR BLD AUTO: 4 %
ERYTHROCYTE [DISTWIDTH] IN BLOOD BY AUTOMATED COUNT: 14.7 % (ref 10–15)
HCT VFR BLD AUTO: 38.2 % (ref 35–47)
HGB BLD-MCNC: 11.7 G/DL (ref 11.7–15.7)
IMM GRANULOCYTES # BLD: 0.2 10E3/UL
IMM GRANULOCYTES NFR BLD: 2 %
LYMPHOCYTES # BLD AUTO: 2.2 10E3/UL (ref 0.8–5.3)
LYMPHOCYTES NFR BLD AUTO: 26 %
MCH RBC QN AUTO: 28.7 PG (ref 26.5–33)
MCHC RBC AUTO-ENTMCNC: 30.6 G/DL (ref 31.5–36.5)
MCV RBC AUTO: 94 FL (ref 78–100)
MONOCYTES # BLD AUTO: 0.6 10E3/UL (ref 0–1.3)
MONOCYTES NFR BLD AUTO: 7 %
NEUTROPHILS # BLD AUTO: 5 10E3/UL (ref 1.6–8.3)
NEUTROPHILS NFR BLD AUTO: 60 %
NRBC # BLD AUTO: 0 10E3/UL
NRBC BLD AUTO-RTO: 0 /100
PLATELET # BLD AUTO: 201 10E3/UL (ref 150–450)
RBC # BLD AUTO: 4.08 10E6/UL (ref 3.8–5.2)
WBC # BLD AUTO: 8.3 10E3/UL (ref 4–11)

## 2024-10-14 PROCEDURE — 85025 COMPLETE CBC W/AUTO DIFF WBC: CPT | Mod: ORL | Performed by: NURSE PRACTITIONER

## 2024-10-14 PROCEDURE — P9604 ONE-WAY ALLOW PRORATED TRIP: HCPCS | Mod: ORL | Performed by: NURSE PRACTITIONER

## 2024-10-14 PROCEDURE — 36415 COLL VENOUS BLD VENIPUNCTURE: CPT | Mod: ORL | Performed by: NURSE PRACTITIONER

## 2024-11-08 ENCOUNTER — LAB REQUISITION (OUTPATIENT)
Dept: LAB | Facility: CLINIC | Age: 74
End: 2024-11-08
Payer: COMMERCIAL

## 2024-11-08 DIAGNOSIS — E11.9 TYPE 2 DIABETES MELLITUS WITHOUT COMPLICATIONS (H): ICD-10-CM

## 2024-11-11 LAB
EST. AVERAGE GLUCOSE BLD GHB EST-MCNC: 183 MG/DL
HBA1C MFR BLD: 8 %

## 2024-11-11 PROCEDURE — 36415 COLL VENOUS BLD VENIPUNCTURE: CPT | Mod: ORL | Performed by: INTERNAL MEDICINE

## 2024-11-11 PROCEDURE — P9604 ONE-WAY ALLOW PRORATED TRIP: HCPCS | Mod: ORL | Performed by: INTERNAL MEDICINE

## 2024-11-11 PROCEDURE — 83036 HEMOGLOBIN GLYCOSYLATED A1C: CPT | Mod: ORL | Performed by: INTERNAL MEDICINE

## 2024-11-26 ENCOUNTER — LAB REQUISITION (OUTPATIENT)
Dept: LAB | Facility: CLINIC | Age: 74
End: 2024-11-26
Payer: COMMERCIAL

## 2024-11-26 DIAGNOSIS — I50.42 CHRONIC COMBINED SYSTOLIC (CONGESTIVE) AND DIASTOLIC (CONGESTIVE) HEART FAILURE (H): ICD-10-CM

## 2024-11-26 PROCEDURE — P9604 ONE-WAY ALLOW PRORATED TRIP: HCPCS | Mod: ORL | Performed by: INTERNAL MEDICINE

## 2024-12-02 LAB
ANION GAP SERPL CALCULATED.3IONS-SCNC: 16 MMOL/L (ref 7–15)
BUN SERPL-MCNC: 24.4 MG/DL (ref 8–23)
CALCIUM SERPL-MCNC: 9.7 MG/DL (ref 8.8–10.4)
CHLORIDE SERPL-SCNC: 100 MMOL/L (ref 98–107)
CREAT SERPL-MCNC: 0.64 MG/DL (ref 0.51–0.95)
EGFRCR SERPLBLD CKD-EPI 2021: >90 ML/MIN/1.73M2
GLUCOSE SERPL-MCNC: 138 MG/DL (ref 70–99)
HCO3 SERPL-SCNC: 23 MMOL/L (ref 22–29)
POTASSIUM SERPL-SCNC: 3.7 MMOL/L (ref 3.4–5.3)
SODIUM SERPL-SCNC: 139 MMOL/L (ref 135–145)

## 2024-12-02 PROCEDURE — 80048 BASIC METABOLIC PNL TOTAL CA: CPT | Mod: ORL | Performed by: INTERNAL MEDICINE

## 2024-12-02 PROCEDURE — 36415 COLL VENOUS BLD VENIPUNCTURE: CPT | Mod: ORL | Performed by: INTERNAL MEDICINE

## 2024-12-07 ENCOUNTER — HOSPITAL ENCOUNTER (INPATIENT)
Facility: CLINIC | Age: 74
End: 2024-12-07
Attending: EMERGENCY MEDICINE | Admitting: STUDENT IN AN ORGANIZED HEALTH CARE EDUCATION/TRAINING PROGRAM
Payer: COMMERCIAL

## 2024-12-07 DIAGNOSIS — I50.9 ACUTE ON CHRONIC CONGESTIVE HEART FAILURE, UNSPECIFIED HEART FAILURE TYPE (H): ICD-10-CM

## 2024-12-07 DIAGNOSIS — A41.9 SEVERE SEPSIS (H): ICD-10-CM

## 2024-12-07 DIAGNOSIS — R09.02 HYPOXIA: ICD-10-CM

## 2024-12-07 DIAGNOSIS — R41.0 DELIRIUM: Primary | ICD-10-CM

## 2024-12-07 DIAGNOSIS — R65.20 SEVERE SEPSIS (H): ICD-10-CM

## 2024-12-07 DIAGNOSIS — I24.89 DEMAND ISCHEMIA OF MYOCARDIUM (H): ICD-10-CM

## 2024-12-07 DIAGNOSIS — J18.9 PNEUMONIA OF BOTH LUNGS DUE TO INFECTIOUS ORGANISM, UNSPECIFIED PART OF LUNG: ICD-10-CM

## 2024-12-07 LAB
ALBUMIN SERPL BCG-MCNC: 4.2 G/DL (ref 3.5–5.2)
ALBUMIN UR-MCNC: 100 MG/DL
ALP SERPL-CCNC: 84 U/L (ref 40–150)
ALT SERPL W P-5'-P-CCNC: 11 U/L (ref 0–50)
ANION GAP SERPL CALCULATED.3IONS-SCNC: 17 MMOL/L (ref 7–15)
APPEARANCE UR: ABNORMAL
AST SERPL W P-5'-P-CCNC: 34 U/L (ref 0–45)
ATRIAL RATE - MUSE: 100 BPM
BACTERIA #/AREA URNS HPF: ABNORMAL /HPF
BASE EXCESS BLDV CALC-SCNC: 1 MMOL/L (ref -3–3)
BASOPHILS # BLD AUTO: 0 10E3/UL (ref 0–0.2)
BASOPHILS NFR BLD AUTO: 0 %
BILIRUB DIRECT SERPL-MCNC: <0.2 MG/DL (ref 0–0.3)
BILIRUB SERPL-MCNC: 0.3 MG/DL
BILIRUB UR QL STRIP: NEGATIVE
BUN SERPL-MCNC: 29.4 MG/DL (ref 8–23)
C PNEUM DNA SPEC QL NAA+PROBE: NOT DETECTED
CALCIUM SERPL-MCNC: 9.2 MG/DL (ref 8.8–10.4)
CHLORIDE SERPL-SCNC: 101 MMOL/L (ref 98–107)
COLOR UR AUTO: ABNORMAL
CREAT SERPL-MCNC: 0.73 MG/DL (ref 0.51–0.95)
DIASTOLIC BLOOD PRESSURE - MUSE: NORMAL MMHG
EGFRCR SERPLBLD CKD-EPI 2021: 85 ML/MIN/1.73M2
EOSINOPHIL # BLD AUTO: 0 10E3/UL (ref 0–0.7)
EOSINOPHIL NFR BLD AUTO: 0 %
ERYTHROCYTE [DISTWIDTH] IN BLOOD BY AUTOMATED COUNT: 15.3 % (ref 10–15)
ERYTHROCYTE [DISTWIDTH] IN BLOOD BY AUTOMATED COUNT: 15.5 % (ref 10–15)
FLUAV H1 2009 PAND RNA SPEC QL NAA+PROBE: NOT DETECTED
FLUAV H1 RNA SPEC QL NAA+PROBE: NOT DETECTED
FLUAV H3 RNA SPEC QL NAA+PROBE: NOT DETECTED
FLUAV RNA SPEC QL NAA+PROBE: NEGATIVE
FLUAV RNA SPEC QL NAA+PROBE: NOT DETECTED
FLUBV RNA RESP QL NAA+PROBE: NEGATIVE
FLUBV RNA SPEC QL NAA+PROBE: NOT DETECTED
GLUCOSE BLDC GLUCOMTR-MCNC: 234 MG/DL (ref 70–99)
GLUCOSE BLDC GLUCOMTR-MCNC: 328 MG/DL (ref 70–99)
GLUCOSE SERPL-MCNC: 223 MG/DL (ref 70–99)
GLUCOSE UR STRIP-MCNC: NEGATIVE MG/DL
HADV DNA SPEC QL NAA+PROBE: NOT DETECTED
HCO3 BLDV-SCNC: 28 MMOL/L (ref 21–28)
HCO3 SERPL-SCNC: 23 MMOL/L (ref 22–29)
HCOV PNL SPEC NAA+PROBE: NOT DETECTED
HCT VFR BLD AUTO: 31.8 % (ref 35–47)
HCT VFR BLD AUTO: 39.1 % (ref 35–47)
HGB BLD-MCNC: 10 G/DL (ref 11.7–15.7)
HGB BLD-MCNC: 12.3 G/DL (ref 11.7–15.7)
HGB UR QL STRIP: NEGATIVE
HMPV RNA SPEC QL NAA+PROBE: NOT DETECTED
HOLD SPECIMEN: NORMAL
HPIV1 RNA SPEC QL NAA+PROBE: NOT DETECTED
HPIV2 RNA SPEC QL NAA+PROBE: NOT DETECTED
HPIV3 RNA SPEC QL NAA+PROBE: NOT DETECTED
HPIV4 RNA SPEC QL NAA+PROBE: NOT DETECTED
IMM GRANULOCYTES # BLD: 0.2 10E3/UL
IMM GRANULOCYTES NFR BLD: 1 %
INTERPRETATION ECG - MUSE: NORMAL
KETONES UR STRIP-MCNC: NEGATIVE MG/DL
LACTATE BLD-SCNC: 3.8 MMOL/L
LACTATE SERPL-SCNC: 3.4 MMOL/L (ref 0.7–2)
LACTATE SERPL-SCNC: 3.6 MMOL/L (ref 0.7–2)
LEUKOCYTE ESTERASE UR QL STRIP: ABNORMAL
LYMPHOCYTES # BLD AUTO: 1.1 10E3/UL (ref 0.8–5.3)
LYMPHOCYTES NFR BLD AUTO: 9 %
M PNEUMO DNA SPEC QL NAA+PROBE: NOT DETECTED
MCH RBC QN AUTO: 28.3 PG (ref 26.5–33)
MCH RBC QN AUTO: 28.6 PG (ref 26.5–33)
MCHC RBC AUTO-ENTMCNC: 31.4 G/DL (ref 31.5–36.5)
MCHC RBC AUTO-ENTMCNC: 31.5 G/DL (ref 31.5–36.5)
MCV RBC AUTO: 90 FL (ref 78–100)
MCV RBC AUTO: 91 FL (ref 78–100)
MONOCYTES # BLD AUTO: 0.7 10E3/UL (ref 0–1.3)
MONOCYTES NFR BLD AUTO: 6 %
NEUTROPHILS # BLD AUTO: 10.7 10E3/UL (ref 1.6–8.3)
NEUTROPHILS NFR BLD AUTO: 84 %
NITRATE UR QL: NEGATIVE
NRBC # BLD AUTO: 0 10E3/UL
NRBC BLD AUTO-RTO: 0 /100
NT-PROBNP SERPL-MCNC: 1806 PG/ML (ref 0–900)
P AXIS - MUSE: 68 DEGREES
PCO2 BLDV: 56 MM HG (ref 40–50)
PH BLDV: 7.3 [PH] (ref 7.32–7.43)
PH UR STRIP: 5.5 [PH] (ref 5–7)
PLATELET # BLD AUTO: 264 10E3/UL (ref 150–450)
PLATELET # BLD AUTO: 309 10E3/UL (ref 150–450)
PO2 BLDV: 48 MM HG (ref 25–47)
POTASSIUM SERPL-SCNC: 4.1 MMOL/L (ref 3.4–5.3)
PR INTERVAL - MUSE: 190 MS
PROT SERPL-MCNC: 7.9 G/DL (ref 6.4–8.3)
QRS DURATION - MUSE: 74 MS
QT - MUSE: 348 MS
QTC - MUSE: 448 MS
R AXIS - MUSE: -11 DEGREES
RBC # BLD AUTO: 3.5 10E6/UL (ref 3.8–5.2)
RBC # BLD AUTO: 4.34 10E6/UL (ref 3.8–5.2)
RBC URINE: 2 /HPF
RSV RNA SPEC NAA+PROBE: NEGATIVE
RSV RNA SPEC QL NAA+PROBE: NOT DETECTED
RSV RNA SPEC QL NAA+PROBE: NOT DETECTED
RV+EV RNA SPEC QL NAA+PROBE: NOT DETECTED
SAO2 % BLDV: 78 % (ref 70–75)
SARS-COV-2 RNA RESP QL NAA+PROBE: NEGATIVE
SODIUM SERPL-SCNC: 141 MMOL/L (ref 135–145)
SP GR UR STRIP: 1.02 (ref 1–1.03)
SQUAMOUS EPITHELIAL: 5 /HPF
SYSTOLIC BLOOD PRESSURE - MUSE: NORMAL MMHG
T AXIS - MUSE: 91 DEGREES
TROPONIN T SERPL HS-MCNC: 142 NG/L
TROPONIN T SERPL HS-MCNC: 220 NG/L
TROPONIN T SERPL HS-MCNC: 270 NG/L
TROPONIN T SERPL HS-MCNC: 279 NG/L
UROBILINOGEN UR STRIP-MCNC: NORMAL MG/DL
VENTRICULAR RATE- MUSE: 100 BPM
WBC # BLD AUTO: 11.6 10E3/UL (ref 4–11)
WBC # BLD AUTO: 12.8 10E3/UL (ref 4–11)
WBC URINE: 13 /HPF

## 2024-12-07 PROCEDURE — 96365 THER/PROPH/DIAG IV INF INIT: CPT

## 2024-12-07 PROCEDURE — 81001 URINALYSIS AUTO W/SCOPE: CPT | Performed by: EMERGENCY MEDICINE

## 2024-12-07 PROCEDURE — 87040 BLOOD CULTURE FOR BACTERIA: CPT | Performed by: EMERGENCY MEDICINE

## 2024-12-07 PROCEDURE — 93005 ELECTROCARDIOGRAM TRACING: CPT

## 2024-12-07 PROCEDURE — 36415 COLL VENOUS BLD VENIPUNCTURE: CPT | Performed by: STUDENT IN AN ORGANIZED HEALTH CARE EDUCATION/TRAINING PROGRAM

## 2024-12-07 PROCEDURE — 258N000003 HC RX IP 258 OP 636: Performed by: EMERGENCY MEDICINE

## 2024-12-07 PROCEDURE — 250N000009 HC RX 250: Performed by: STUDENT IN AN ORGANIZED HEALTH CARE EDUCATION/TRAINING PROGRAM

## 2024-12-07 PROCEDURE — 82803 BLOOD GASES ANY COMBINATION: CPT

## 2024-12-07 PROCEDURE — 84075 ASSAY ALKALINE PHOSPHATASE: CPT | Performed by: STUDENT IN AN ORGANIZED HEALTH CARE EDUCATION/TRAINING PROGRAM

## 2024-12-07 PROCEDURE — 210N000001 HC R&B IMCU HEART CARE

## 2024-12-07 PROCEDURE — 999N000157 HC STATISTIC RCP TIME EA 10 MIN

## 2024-12-07 PROCEDURE — 99292 CRITICAL CARE ADDL 30 MIN: CPT

## 2024-12-07 PROCEDURE — 36415 COLL VENOUS BLD VENIPUNCTURE: CPT | Performed by: EMERGENCY MEDICINE

## 2024-12-07 PROCEDURE — 250N000011 HC RX IP 250 OP 636: Performed by: STUDENT IN AN ORGANIZED HEALTH CARE EDUCATION/TRAINING PROGRAM

## 2024-12-07 PROCEDURE — 250N000013 HC RX MED GY IP 250 OP 250 PS 637: Performed by: STUDENT IN AN ORGANIZED HEALTH CARE EDUCATION/TRAINING PROGRAM

## 2024-12-07 PROCEDURE — 94640 AIRWAY INHALATION TREATMENT: CPT

## 2024-12-07 PROCEDURE — 85027 COMPLETE CBC AUTOMATED: CPT | Performed by: STUDENT IN AN ORGANIZED HEALTH CARE EDUCATION/TRAINING PROGRAM

## 2024-12-07 PROCEDURE — 83605 ASSAY OF LACTIC ACID: CPT

## 2024-12-07 PROCEDURE — 250N000012 HC RX MED GY IP 250 OP 636 PS 637: Performed by: STUDENT IN AN ORGANIZED HEALTH CARE EDUCATION/TRAINING PROGRAM

## 2024-12-07 PROCEDURE — 250N000009 HC RX 250: Performed by: EMERGENCY MEDICINE

## 2024-12-07 PROCEDURE — 87086 URINE CULTURE/COLONY COUNT: CPT | Performed by: EMERGENCY MEDICINE

## 2024-12-07 PROCEDURE — 99223 1ST HOSP IP/OBS HIGH 75: CPT | Performed by: STUDENT IN AN ORGANIZED HEALTH CARE EDUCATION/TRAINING PROGRAM

## 2024-12-07 PROCEDURE — 93010 ELECTROCARDIOGRAM REPORT: CPT | Mod: XP | Performed by: INTERNAL MEDICINE

## 2024-12-07 PROCEDURE — 99418 PROLNG IP/OBS E/M EA 15 MIN: CPT | Performed by: STUDENT IN AN ORGANIZED HEALTH CARE EDUCATION/TRAINING PROGRAM

## 2024-12-07 PROCEDURE — 84484 ASSAY OF TROPONIN QUANT: CPT | Performed by: EMERGENCY MEDICINE

## 2024-12-07 PROCEDURE — 84155 ASSAY OF PROTEIN SERUM: CPT | Performed by: STUDENT IN AN ORGANIZED HEALTH CARE EDUCATION/TRAINING PROGRAM

## 2024-12-07 PROCEDURE — 250N000011 HC RX IP 250 OP 636: Performed by: EMERGENCY MEDICINE

## 2024-12-07 PROCEDURE — 84484 ASSAY OF TROPONIN QUANT: CPT | Performed by: STUDENT IN AN ORGANIZED HEALTH CARE EDUCATION/TRAINING PROGRAM

## 2024-12-07 PROCEDURE — 87486 CHLMYD PNEUM DNA AMP PROBE: CPT | Performed by: STUDENT IN AN ORGANIZED HEALTH CARE EDUCATION/TRAINING PROGRAM

## 2024-12-07 PROCEDURE — 82435 ASSAY OF BLOOD CHLORIDE: CPT | Performed by: EMERGENCY MEDICINE

## 2024-12-07 PROCEDURE — 36415 COLL VENOUS BLD VENIPUNCTURE: CPT

## 2024-12-07 PROCEDURE — 85025 COMPLETE CBC W/AUTO DIFF WBC: CPT | Performed by: EMERGENCY MEDICINE

## 2024-12-07 PROCEDURE — 99291 CRITICAL CARE FIRST HOUR: CPT | Mod: 25

## 2024-12-07 PROCEDURE — 94640 AIRWAY INHALATION TREATMENT: CPT | Mod: 76

## 2024-12-07 PROCEDURE — 83880 ASSAY OF NATRIURETIC PEPTIDE: CPT | Performed by: EMERGENCY MEDICINE

## 2024-12-07 PROCEDURE — 87637 SARSCOV2&INF A&B&RSV AMP PRB: CPT | Performed by: EMERGENCY MEDICINE

## 2024-12-07 PROCEDURE — 96367 TX/PROPH/DG ADDL SEQ IV INF: CPT

## 2024-12-07 PROCEDURE — 80048 BASIC METABOLIC PNL TOTAL CA: CPT | Performed by: EMERGENCY MEDICINE

## 2024-12-07 PROCEDURE — 96366 THER/PROPH/DIAG IV INF ADDON: CPT

## 2024-12-07 PROCEDURE — 96368 THER/DIAG CONCURRENT INF: CPT

## 2024-12-07 PROCEDURE — 96375 TX/PRO/DX INJ NEW DRUG ADDON: CPT

## 2024-12-07 PROCEDURE — 83605 ASSAY OF LACTIC ACID: CPT | Performed by: STUDENT IN AN ORGANIZED HEALTH CARE EDUCATION/TRAINING PROGRAM

## 2024-12-07 RX ORDER — FUROSEMIDE 10 MG/ML
60 INJECTION INTRAMUSCULAR; INTRAVENOUS ONCE
Status: COMPLETED | OUTPATIENT
Start: 2024-12-07 | End: 2024-12-07

## 2024-12-07 RX ORDER — SIMVASTATIN 20 MG
20 TABLET ORAL AT BEDTIME
Status: DISCONTINUED | OUTPATIENT
Start: 2024-12-07 | End: 2024-12-26 | Stop reason: HOSPADM

## 2024-12-07 RX ORDER — IPRATROPIUM BROMIDE AND ALBUTEROL SULFATE 2.5; .5 MG/3ML; MG/3ML
3 SOLUTION RESPIRATORY (INHALATION)
Status: DISCONTINUED | OUTPATIENT
Start: 2024-12-07 | End: 2024-12-16

## 2024-12-07 RX ORDER — HEPARIN SODIUM 10000 [USP'U]/100ML
0-5000 INJECTION, SOLUTION INTRAVENOUS CONTINUOUS
Status: DISCONTINUED | OUTPATIENT
Start: 2024-12-07 | End: 2024-12-09

## 2024-12-07 RX ORDER — ALBUTEROL SULFATE 1.25 MG/3ML
1.25 SOLUTION RESPIRATORY (INHALATION) EVERY 6 HOURS PRN
COMMUNITY
End: 2024-12-07

## 2024-12-07 RX ORDER — CLOPIDOGREL BISULFATE 75 MG/1
75 TABLET ORAL DAILY
Status: DISCONTINUED | OUTPATIENT
Start: 2024-12-07 | End: 2024-12-26 | Stop reason: HOSPADM

## 2024-12-07 RX ORDER — LIDOCAINE 40 MG/G
CREAM TOPICAL
Status: DISCONTINUED | OUTPATIENT
Start: 2024-12-07 | End: 2024-12-26 | Stop reason: HOSPADM

## 2024-12-07 RX ORDER — GUAIFENESIN 200 MG/10ML
5 LIQUID ORAL EVERY 4 HOURS PRN
COMMUNITY

## 2024-12-07 RX ORDER — ACETAMINOPHEN 650 MG/1
650 SUPPOSITORY RECTAL EVERY 4 HOURS PRN
Status: DISCONTINUED | OUTPATIENT
Start: 2024-12-07 | End: 2024-12-26 | Stop reason: HOSPADM

## 2024-12-07 RX ORDER — ALLOPURINOL 100 MG/1
200 TABLET ORAL DAILY
Status: DISCONTINUED | OUTPATIENT
Start: 2024-12-07 | End: 2024-12-26 | Stop reason: HOSPADM

## 2024-12-07 RX ORDER — CEFTRIAXONE 2 G/1
2 INJECTION, POWDER, FOR SOLUTION INTRAMUSCULAR; INTRAVENOUS EVERY 24 HOURS
Status: COMPLETED | OUTPATIENT
Start: 2024-12-07 | End: 2024-12-11

## 2024-12-07 RX ORDER — CLOPIDOGREL BISULFATE 75 MG/1
75 TABLET ORAL DAILY
COMMUNITY

## 2024-12-07 RX ORDER — DEXTROSE MONOHYDRATE 25 G/50ML
25-50 INJECTION, SOLUTION INTRAVENOUS
Status: DISCONTINUED | OUTPATIENT
Start: 2024-12-07 | End: 2024-12-12

## 2024-12-07 RX ORDER — ENOXAPARIN SODIUM 100 MG/ML
40 INJECTION SUBCUTANEOUS EVERY 24 HOURS
Status: DISCONTINUED | OUTPATIENT
Start: 2024-12-07 | End: 2024-12-07

## 2024-12-07 RX ORDER — CALCIUM CARBONATE 500 MG/1
1000 TABLET, CHEWABLE ORAL 4 TIMES DAILY PRN
Status: DISCONTINUED | OUTPATIENT
Start: 2024-12-07 | End: 2024-12-26 | Stop reason: HOSPADM

## 2024-12-07 RX ORDER — METOPROLOL TARTRATE 50 MG
50 TABLET ORAL 2 TIMES DAILY
Status: ON HOLD | COMMUNITY
End: 2024-12-26

## 2024-12-07 RX ORDER — IPRATROPIUM BROMIDE AND ALBUTEROL SULFATE 2.5; .5 MG/3ML; MG/3ML
3 SOLUTION RESPIRATORY (INHALATION) ONCE
Status: COMPLETED | OUTPATIENT
Start: 2024-12-07 | End: 2024-12-07

## 2024-12-07 RX ORDER — AMLODIPINE BESYLATE 10 MG/1
10 TABLET ORAL 2 TIMES DAILY
Status: DISCONTINUED | OUTPATIENT
Start: 2024-12-07 | End: 2024-12-26 | Stop reason: HOSPADM

## 2024-12-07 RX ORDER — POLYETHYLENE GLYCOL 3350 17 G/17G
17 POWDER, FOR SOLUTION ORAL DAILY
Status: DISCONTINUED | OUTPATIENT
Start: 2024-12-07 | End: 2024-12-26 | Stop reason: HOSPADM

## 2024-12-07 RX ORDER — ACETAMINOPHEN 325 MG/1
650 TABLET ORAL EVERY 4 HOURS PRN
Status: DISCONTINUED | OUTPATIENT
Start: 2024-12-07 | End: 2024-12-26 | Stop reason: HOSPADM

## 2024-12-07 RX ORDER — AMOXICILLIN 250 MG
1 CAPSULE ORAL 2 TIMES DAILY PRN
Status: DISCONTINUED | OUTPATIENT
Start: 2024-12-07 | End: 2024-12-12

## 2024-12-07 RX ORDER — ALLOPURINOL 100 MG/1
200 TABLET ORAL DAILY
COMMUNITY

## 2024-12-07 RX ORDER — AMOXICILLIN 250 MG
2 CAPSULE ORAL 2 TIMES DAILY PRN
Status: DISCONTINUED | OUTPATIENT
Start: 2024-12-07 | End: 2024-12-12

## 2024-12-07 RX ORDER — AZITHROMYCIN MONOHYDRATE 500 MG/5ML
500 INJECTION, POWDER, LYOPHILIZED, FOR SOLUTION INTRAVENOUS ONCE
Status: COMPLETED | OUTPATIENT
Start: 2024-12-07 | End: 2024-12-07

## 2024-12-07 RX ORDER — LOPERAMIDE HYDROCHLORIDE 2 MG/1
2 CAPSULE ORAL EVERY 12 HOURS PRN
COMMUNITY

## 2024-12-07 RX ORDER — NICOTINE POLACRILEX 4 MG
15-30 LOZENGE BUCCAL
Status: DISCONTINUED | OUTPATIENT
Start: 2024-12-07 | End: 2024-12-12

## 2024-12-07 RX ORDER — PIPERACILLIN SODIUM, TAZOBACTAM SODIUM 4; .5 G/20ML; G/20ML
4.5 INJECTION, POWDER, LYOPHILIZED, FOR SOLUTION INTRAVENOUS ONCE
Status: COMPLETED | OUTPATIENT
Start: 2024-12-07 | End: 2024-12-07

## 2024-12-07 RX ORDER — SIMVASTATIN 20 MG
20 TABLET ORAL AT BEDTIME
COMMUNITY

## 2024-12-07 RX ORDER — HALOPERIDOL 5 MG/ML
2 INJECTION INTRAMUSCULAR EVERY 6 HOURS PRN
Status: DISCONTINUED | OUTPATIENT
Start: 2024-12-07 | End: 2024-12-12

## 2024-12-07 RX ORDER — AMLODIPINE BESYLATE 10 MG/1
10 TABLET ORAL 2 TIMES DAILY
COMMUNITY

## 2024-12-07 RX ORDER — LOPERAMIDE HYDROCHLORIDE 2 MG/1
2 CAPSULE ORAL EVERY 12 HOURS PRN
Status: DISCONTINUED | OUTPATIENT
Start: 2024-12-07 | End: 2024-12-26 | Stop reason: HOSPADM

## 2024-12-07 RX ORDER — INSULIN ASPART 100 [IU]/ML
INJECTION, SOLUTION INTRAVENOUS; SUBCUTANEOUS
COMMUNITY

## 2024-12-07 RX ORDER — AZITHROMYCIN MONOHYDRATE 500 MG/5ML
500 INJECTION, POWDER, LYOPHILIZED, FOR SOLUTION INTRAVENOUS EVERY 24 HOURS
Status: COMPLETED | OUTPATIENT
Start: 2024-12-08 | End: 2024-12-09

## 2024-12-07 RX ORDER — ACETAMINOPHEN 500 MG
1000 TABLET ORAL 3 TIMES DAILY
COMMUNITY

## 2024-12-07 RX ORDER — AMOXICILLIN 250 MG
1 CAPSULE ORAL 2 TIMES DAILY
COMMUNITY

## 2024-12-07 RX ORDER — LIDOCAINE 40 MG/G
CREAM TOPICAL
Status: DISCONTINUED | OUTPATIENT
Start: 2024-12-07 | End: 2024-12-07

## 2024-12-07 RX ORDER — TORSEMIDE 20 MG/1
20 TABLET ORAL DAILY
COMMUNITY

## 2024-12-07 RX ADMIN — SIMVASTATIN 20 MG: 20 TABLET, FILM COATED ORAL at 21:07

## 2024-12-07 RX ADMIN — AZITHROMYCIN MONOHYDRATE 500 MG: 500 INJECTION, POWDER, LYOPHILIZED, FOR SOLUTION INTRAVENOUS at 13:53

## 2024-12-07 RX ADMIN — FUROSEMIDE 60 MG: 10 INJECTION, SOLUTION INTRAMUSCULAR; INTRAVENOUS at 12:28

## 2024-12-07 RX ADMIN — HALOPERIDOL LACTATE 2 MG: 5 INJECTION, SOLUTION INTRAMUSCULAR at 21:33

## 2024-12-07 RX ADMIN — CEFTRIAXONE SODIUM 2 G: 2 INJECTION, POWDER, FOR SOLUTION INTRAMUSCULAR; INTRAVENOUS at 17:55

## 2024-12-07 RX ADMIN — IPRATROPIUM BROMIDE AND ALBUTEROL SULFATE 3 ML: .5; 3 SOLUTION RESPIRATORY (INHALATION) at 10:05

## 2024-12-07 RX ADMIN — AMLODIPINE BESYLATE 10 MG: 10 TABLET ORAL at 21:07

## 2024-12-07 RX ADMIN — IPRATROPIUM BROMIDE AND ALBUTEROL SULFATE 3 ML: .5; 3 SOLUTION RESPIRATORY (INHALATION) at 16:23

## 2024-12-07 RX ADMIN — SODIUM CHLORIDE 500 ML: 9 INJECTION, SOLUTION INTRAVENOUS at 10:26

## 2024-12-07 RX ADMIN — IPRATROPIUM BROMIDE AND ALBUTEROL SULFATE 3 ML: .5; 3 SOLUTION RESPIRATORY (INHALATION) at 19:27

## 2024-12-07 RX ADMIN — PIPERACILLIN AND TAZOBACTAM 4.5 G: 4; .5 INJECTION, POWDER, FOR SOLUTION INTRAVENOUS at 10:23

## 2024-12-07 RX ADMIN — NITROGLYCERIN 10 MCG/MIN: 20 INJECTION INTRAVENOUS at 14:23

## 2024-12-07 RX ADMIN — CLOPIDOGREL BISULFATE 75 MG: 75 TABLET ORAL at 17:14

## 2024-12-07 RX ADMIN — INSULIN GLARGINE 25 UNITS: 100 INJECTION, SOLUTION SUBCUTANEOUS at 21:35

## 2024-12-07 RX ADMIN — ENOXAPARIN SODIUM 40 MG: 40 INJECTION SUBCUTANEOUS at 18:34

## 2024-12-07 RX ADMIN — POLYETHYLENE GLYCOL 3350 17 G: 17 POWDER, FOR SOLUTION ORAL at 17:13

## 2024-12-07 RX ADMIN — INSULIN ASPART 2 UNITS: 100 INJECTION, SOLUTION INTRAVENOUS; SUBCUTANEOUS at 17:54

## 2024-12-07 RX ADMIN — Medication 1 MG: at 21:07

## 2024-12-07 RX ADMIN — FUROSEMIDE 60 MG: 10 INJECTION, SOLUTION INTRAMUSCULAR; INTRAVENOUS at 17:14

## 2024-12-07 RX ADMIN — SERTRALINE HYDROCHLORIDE 50 MG: 50 TABLET ORAL at 17:14

## 2024-12-07 RX ADMIN — ALLOPURINOL 200 MG: 100 TABLET ORAL at 17:13

## 2024-12-07 ASSESSMENT — ACTIVITIES OF DAILY LIVING (ADL)
ADLS_ACUITY_SCORE: 69
ADLS_ACUITY_SCORE: 69
ADLS_ACUITY_SCORE: 41
ADLS_ACUITY_SCORE: 41
ADLS_ACUITY_SCORE: 75
ADLS_ACUITY_SCORE: 41
ADLS_ACUITY_SCORE: 41
ADLS_ACUITY_SCORE: 69
ADLS_ACUITY_SCORE: 41
ADLS_ACUITY_SCORE: 75
ADLS_ACUITY_SCORE: 41
ADLS_ACUITY_SCORE: 69
ADLS_ACUITY_SCORE: 41
ADLS_ACUITY_SCORE: 41
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
## 2024-12-07 NOTE — ED PROVIDER NOTES
Emergency Department Note      History of Present Illness     Chief Complaint   Shortness of Breath      HPI   Jessika Garcia is a 74 year old female with history of CHF, CVA, hypertension, hyperlipidemia, type 2 diabetes, and uterine cancer who presents with shortness of breath. The patient presents from her care facility where she has been short of breath since yesterday afternoon. She denies using any nebulizers for this, but her care facility staff notes treating her with nebulizers all last night. The patient's breathing worsened overnight and through this morning. She also endorses coughing but not any currently. She feels like her shortness of breath has improved after being placed on oxygen. The patient denies history of smoking. Of note, the patient has had her left great toe amputated and also had a procedure on her right knee.      Independent Historian   EMS as detailed above.    Review of External Notes       Past Medical History     Medical History and Problem List   Asthma  Bilateral lower extremity edema  Ataxia  CHF  Chronic gout  COVID infection  CVA  Hypertension  Uterine cancer  Hyperlipidemia  Type 2 diabetes  Patellar bursitis  Patent foramen ovale  TBI  Peripheral vascular disease    Medications   Zyloprim  Norvasc  Lantus pen  Humalog pen  Duoneb  Zofran  Zoloft  Demadex  Lisinopril  Proair    Surgical History  Hysterectomy  Cholecystectomy  Left great toe amputation  Quadricepts tendon rupture repair (R)  ORIF hip (R)    Physical Exam     Patient Vitals for the past 24 hrs:   BP Temp Temp src Pulse Resp SpO2 Weight   12/07/24 1230 (!) 183/92 -- -- 98 29 98 % --   12/07/24 1200 (!) 176/90 -- -- 101 26 97 % --   12/07/24 1130 (!) 160/87 -- -- 95 22 99 % --   12/07/24 1100 (!) 172/89 -- -- 99 28 96 % --   12/07/24 0948 (!) 149/71 97.5  F (36.4  C) Oral 105 25 96 % 89.8 kg (198 lb)   12/07/24 0947 -- -- -- -- 28 -- --   12/07/24 0944 -- -- -- -- -- 96 % --   12/07/24 0943 (!) 149/71 -- -- 104 --  97 % --     Physical Exam  General: Resting comfortably on the gurney  Head:  The scalp, face, and head appear normal  Eyes:  The pupils are equal, round, and reactive to light    There is no nystagmus    Extraocular muscles are intact    Conjunctivae and sclerae are normal  ENT:    The nose is normal    Pinnae are normal    The oropharynx is normal    Uvula is in the midline  Neck:  Normal range of motion    There is no rigidity noted    There is no midline cervical spine pain/tenderness    Trachea is in the midline    No mass is detected  CV:  Regular rate and underlying rhythm     Normal S1/S2, no S3/S4    No pathological murmur detected  Resp:  There is no tachypnea  Trace posterior basilar crackles. Inspiratory and expiratory wheezing bilaterally.  GI:  Abdomen is soft, there is no rigidity    No distension    No tympani    No rebound tenderness     Non-surgical without peritoneal features  MS:  Normal muscular tone    Symmetric motor strength    No major joint effusions    No asymmetric leg swelling, no calf tenderness  Skin:  No rash or acute skin lesions noted    Well-healed surgical scar to the right knee.  Left great toe amputation.  Neuro:  Speech is normal and fluent  Psych: Awake. Alert.      Normal affect.  Appropriate interactions.  Lymph: No anterior cervical lymphadenopathy noted        Diagnostics     Lab Results   Labs Ordered and Resulted from Time of ED Arrival to Time of ED Departure   ISTAT GASES LACTATE VENOUS POCT - Abnormal       Result Value    Lactic Acid POCT 3.8 (*)     Bicarbonate Venous POCT 28      O2 Sat, Venous POCT 78 (*)     pCO2 Venous POCT 56 (*)     pH Venous POCT 7.30 (*)     pO2 Venous POCT 48 (*)     Base Excess/Deficit (+/-) POCT 1.0     BASIC METABOLIC PANEL - Abnormal    Sodium 141      Potassium 4.1      Chloride 101      Carbon Dioxide (CO2) 23      Anion Gap 17 (*)     Urea Nitrogen 29.4 (*)     Creatinine 0.73      GFR Estimate 85      Calcium 9.2      Glucose 223  (*)    CBC WITH PLATELETS AND DIFFERENTIAL - Abnormal    WBC Count 12.8 (*)     RBC Count 4.34      Hemoglobin 12.3      Hematocrit 39.1      MCV 90      MCH 28.3      MCHC 31.5      RDW 15.3 (*)     Platelet Count 309      % Neutrophils 84      % Lymphocytes 9      % Monocytes 6      % Eosinophils 0      % Basophils 0      % Immature Granulocytes 1      NRBCs per 100 WBC 0      Absolute Neutrophils 10.7 (*)     Absolute Lymphocytes 1.1      Absolute Monocytes 0.7      Absolute Eosinophils 0.0      Absolute Basophils 0.0      Absolute Immature Granulocytes 0.2      Absolute NRBCs 0.0     LACTIC ACID WHOLE BLOOD - Abnormal    Lactic Acid 3.6 (*)    NT PROBNP INPATIENT - Abnormal    N terminal Pro BNP Inpatient 1,806 (*)    TROPONIN T, HIGH SENSITIVITY - Abnormal    Troponin T, High Sensitivity 142 (*)    INFLUENZA A/B, RSV AND SARS-COV2 PCR - Normal    Influenza A PCR Negative      Influenza B PCR Negative      RSV PCR Negative      SARS CoV2 PCR Negative     ROUTINE UA WITH MICROSCOPIC REFLEX TO CULTURE   BLOOD CULTURE       Imaging   XR Chest 2 Views   Final Result   IMPRESSION: Cardiac silhouette and mediastinal contours are normal. Hazy interstitial and airspace opacities bilaterally, compatible with moderate pulmonary edema or less likely atypical infection. Small left pleural effusion. No pneumothorax.          EKG   ECG taken at 1313, ECG read at 1318  Normal sinus rhythm  Anterior infarct, age undetermined  Abnormal ECG   Rate 100 bpm. ME interval 190 ms. QRS duration 74 ms. QT/QTc 348/448 ms. P-R-T axes 68 -11 91.     Independent Interpretation   I independently interpreted the patient's chest x-ray and note bilateral interstitial infiltrates and a pleural effusion consistent with CHF. I can't exclude a small area of pneumonia at the left lung base.    ED Course      Medications Administered   Medications   azithromycin (ZITHROMAX) 500 mg vial to attach to  mL bag (has no administration in time range)    nitroGLYcerin 50 mg in D5W 250 mL PERIPHERAL IV infusion (has no administration in time range)   piperacillin-tazobactam (ZOSYN) 4.5 g vial to attach to  mL bag (0 g Intravenous Stopped 12/7/24 1053)   sodium chloride 0.9% BOLUS 500 mL (0 mLs Intravenous Stopped 12/7/24 1056)   ipratropium - albuterol 0.5 mg/2.5 mg/3 mL (DUONEB) neb solution 3 mL (3 mLs Nebulization $Given 12/7/24 1005)   furosemide (LASIX) injection 60 mg (60 mg Intravenous $Given 12/7/24 1228)         Discussion of Management   1326 I spoke with Dr. Linares, hospitalist, who accepts the patient.    ED Course   ED Course as of 12/07/24 1326   Sat Dec 07, 2024   0956 I obtained history and examined the patient as noted above   1311 I rechecked the patient and explained findings.       Additional Documentation  None    Medical Decision Making / Diagnosis     CMS Diagnoses: The patient has signs of sepsis   Sepsis ED evaluation   The patient has signs of sepsis as evidenced by:  1. Presence of 2 SIRS criteria, suspected infection, AND  2. Organ dysfunction: Lactic Acidosis with value >2.0 due to sepsis    Time zero:  1000  on 12/07/24 as this was the time when Lactate was resulted and the level was greater than 2.    Note: Due to a national blood culture bottle shortage, reduced blood cultures may have been drawn on this patient.    Lactic Acid Results:  Recent Labs   Lab Test 12/07/24  1234 12/07/24  0953   LACT 3.6* 3.8*       3 Hour Bundle 6 Hour Bundle (Reassessment)   Blood Cultures before IV Antibiotics: Yes  Antibiotics given: see below  Prehospital fluid volume (mL):                     Total fluids given (ED +Pre-hospital):  The patient responded to a lesser volume of IV fluids. The initial volume ordered was 500 mL.  The patient also has acute congestive heart failure   Repeat Lactic Acid Level: Ordered by reflex for 2 hours after initial lactic acid collection.  Vasopressors: MAP>65 after initial IVF bolus, will continue to monitor  "fluid status and vital signs.  Repeat perfusion exam: I attest to having performed a repeat sepsis exam and assessment of perfusion at 1:14 PM .   BMI Readings from Last 1 Encounters:   No data found for BMI       Anti-infectives (From admission through now)      Start     Dose/Rate Route Frequency Ordered Stop    12/07/24 1310  azithromycin (ZITHROMAX) 500 mg vial to attach to  mL bag         500 mg  over 1 Hours Intravenous ONCE 12/07/24 1308      12/07/24 1005  piperacillin-tazobactam (ZOSYN) 4.5 g vial to attach to  mL bag        Note to Pharmacy: For [facility], [facility] and [facility]: For Zosyn-naive patients, use the \"Zosyn initial dose + extended infusion\" order panel.    4.5 g  over 30 Minutes Intravenous ONCE 12/07/24 1001 12/07/24 1053                MIPS       None    [facility]   Jessika Garcia is a 74 year old female presents to the emergency department with increasing shortness of breath.  She notes that this started sometime yesterday.  She does have a history of congestive heart failure and bilateral lower extremity edema.  On physical exam she is noted to have bilateral crackles, and inspiratory and expiratory wheezing.  She was noted to be hypoxic in the high 70s to low 80s on room air, she did respond to nasal cannula oxygen and went up into the 90s.  She is aware that she has been having some dyspnea for the last 24 hours.  The patient's chest radiograph shows marked increased interstitial markings consistent with probable CHF versus the possibility of atypical pneumonitis.  I cannot exclude a subtle pneumonia involving the left lung base.  Patient also has a pleural effusion.  She also has mild lower extremity edema.  BNP level and troponin are both elevated.  Lactate is also elevated.  The patient is likely been significantly hypoxic last evening and overnight and so demand ischemia and the elevated lactate may be secondary to the patient's prolonged hypoxia.  Patient will require admission to the hospital " for diuresis and empiric intravenous antibiotics.  Patient will be admitted to the hospitalist.    Given some escalating blood pressures later in the emergency department course, the lack of significant urine output, the hypoxia, and the acute on chronic CHF, I have initiated a peripheral nitroglycerin infusion and we will place this patient in the intermediate care area.  The highest likelihood diagnosis here is acute exacerbation of CHF, although occult severe sepsis cannot be excluded at this time.    Disposition   The patient was admitted to the hospital.     Diagnosis     ICD-10-CM    1. Acute on chronic congestive heart failure, unspecified heart failure type (H)  I50.9       2. Hypoxia  R09.02       3. Pneumonia of both lungs due to infectious organism, unspecified part of lung  J18.9       4. Demand ischemia of myocardium (H)  I24.89       5. Severe sepsis (H)  A41.9     R65.20                Scribe Disclosure:  Torres AQUINO, am serving as a scribe at 9:49 AM on 12/7/2024 to document services personally performed by Tony Booth MD based on my observations and the provider's statements to me.        Tony Booth MD  12/07/24 8023

## 2024-12-07 NOTE — PHARMACY-ADMISSION MEDICATION HISTORY
Pharmacy Intern Admission Medication History    Admission medication history is complete. The information provided in this note is only as accurate as the sources available at the time of the update.    Information Source(s): Facility ([facility]/NH/) medication list/MAR via N/A    Pertinent Information:   All information gathered from MAR    Changes made to PTA medication list:  Added: Allopurinol, guaifenesin, loperamide, lidocaine, metformin, metoprolol, amlodipine, plavix, senna, simvastatin, lantus, novolog  Deleted: Lipitor, lisinopril, omnicef, percocet, rocephin, toradol  Changed: None    Allergies reviewed with patient and updates made in EHR: no    Medication History Completed By: Tanya Avitia 12/7/2024 10:57 AM    PTA Med List   Medication Sig Last Dose/Taking    acetaminophen (TYLENOL) 500 MG tablet Take 1,000 mg by mouth 3 times daily. 12/6/2024 Evening    allopurinol (ZYLOPRIM) 100 MG tablet Take 200 mg by mouth daily. 12/6/2024 Morning    amLODIPine (NORVASC) 10 MG tablet Take 10 mg by mouth 2 times daily. 12/6/2024 Evening    clopidogrel (PLAVIX) 75 MG tablet Take 75 mg by mouth daily. 12/6/2024 Morning    guaiFENesin (ROBITUSSIN) 20 mg/mL liquid Take 5 mLs by mouth every 4 hours as needed for cough. Taking As Needed    HM LIDOCAINE PATCH EX Externally apply 1 patch topically daily. Remove every night 12/6/2024    insulin aspart (NOVOLOG FLEXPEN) 100 UNIT/ML pen Inject subcutaneously 3 times daily (with meals). Sliding scale:  If 250-300 = 2 units; If 301-350 = 4 units; If 351+ = 6 units 12/6/2024 Evening    insulin glargine (LANTUS PEN) 100 UNIT/ML pen Inject 25 Units subcutaneously at bedtime. 12/6/2024 Evening    loperamide (IMODIUM) 2 MG capsule Take 2 mg by mouth every 12 hours as needed for diarrhea. Taking As Needed    metFORMIN (GLUCOPHAGE) 500 MG tablet Take 1,000 mg by mouth 2 times daily (with meals). 12/6/2024 Evening    metoprolol tartrate (LOPRESSOR) 50 MG tablet Take 50 mg by mouth 2  times daily. 12/6/2024 Evening    senna-docusate (SENOKOT-S/PERICOLACE) 8.6-50 MG tablet Take 1 tablet by mouth 2 times daily. 12/6/2024 Evening    sertraline (ZOLOFT) 50 MG tablet Take 50 mg by mouth daily. 12/6/2024 Morning    simvastatin (ZOCOR) 20 MG tablet Take 20 mg by mouth at bedtime. 12/6/2024 Evening    torsemide (DEMADEX) 20 MG tablet Take 20 mg by mouth daily. 12/6/2024 Morning

## 2024-12-07 NOTE — ED NOTES
Bed: ED06  Expected date:   Expected time:   Means of arrival:   Comments:  [facility] [address] 74 F hypoxia continuous nebs with high flow eta 0913

## 2024-12-07 NOTE — ED TRIAGE NOTES
Pt presents to ed via ems to be evaluated for sob.   Ems report as follows: last night staff at her care facility the pt was sob, and they were giving her serial nebs. They checked her O2, and she was in the 70s, so they placed her on O2 which brought her stats up. When staff checked on the patient tonight, she was still in the 80s on 2L of O2. When EMS arrived, on RA, the patient was in the 70s. Pt has a hx of CHF, and takes lasix at home.   Here in the ED, pt stating in the 90s on 3L NC.      Triage Assessment (Adult)       Row Name 12/07/24 0940          Triage Assessment    Airway WDL WDL        Respiratory WDL    Rhythm/Pattern, Respiratory shortness of breath;tachypneic

## 2024-12-08 ENCOUNTER — APPOINTMENT (OUTPATIENT)
Dept: CARDIOLOGY | Facility: CLINIC | Age: 74
End: 2024-12-08
Attending: STUDENT IN AN ORGANIZED HEALTH CARE EDUCATION/TRAINING PROGRAM
Payer: COMMERCIAL

## 2024-12-08 LAB
ANION GAP SERPL CALCULATED.3IONS-SCNC: 16 MMOL/L (ref 7–15)
ATRIAL RATE - MUSE: 110 BPM
ATRIAL RATE - MUSE: 113 BPM
BACTERIA UR CULT: NO GROWTH
BUN SERPL-MCNC: 29.9 MG/DL (ref 8–23)
CALCIUM SERPL-MCNC: 8.5 MG/DL (ref 8.8–10.4)
CHLORIDE SERPL-SCNC: 96 MMOL/L (ref 98–107)
CREAT SERPL-MCNC: 0.77 MG/DL (ref 0.51–0.95)
DIASTOLIC BLOOD PRESSURE - MUSE: NORMAL MMHG
DIASTOLIC BLOOD PRESSURE - MUSE: NORMAL MMHG
EGFRCR SERPLBLD CKD-EPI 2021: 80 ML/MIN/1.73M2
ERYTHROCYTE [DISTWIDTH] IN BLOOD BY AUTOMATED COUNT: 15.3 % (ref 10–15)
GLUCOSE BLDC GLUCOMTR-MCNC: 175 MG/DL (ref 70–99)
GLUCOSE BLDC GLUCOMTR-MCNC: 181 MG/DL (ref 70–99)
GLUCOSE BLDC GLUCOMTR-MCNC: 185 MG/DL (ref 70–99)
GLUCOSE BLDC GLUCOMTR-MCNC: 199 MG/DL (ref 70–99)
GLUCOSE BLDC GLUCOMTR-MCNC: 287 MG/DL (ref 70–99)
GLUCOSE SERPL-MCNC: 276 MG/DL (ref 70–99)
HCO3 SERPL-SCNC: 23 MMOL/L (ref 22–29)
HCT VFR BLD AUTO: 29.7 % (ref 35–47)
HGB BLD-MCNC: 9.5 G/DL (ref 11.7–15.7)
INTERPRETATION ECG - MUSE: NORMAL
INTERPRETATION ECG - MUSE: NORMAL
LACTATE SERPL-SCNC: 2.6 MMOL/L (ref 0.7–2)
LVEF ECHO: NORMAL
MAGNESIUM SERPL-MCNC: 1.3 MG/DL (ref 1.7–2.3)
MAGNESIUM SERPL-MCNC: 2.4 MG/DL (ref 1.7–2.3)
MCH RBC QN AUTO: 28.5 PG (ref 26.5–33)
MCHC RBC AUTO-ENTMCNC: 32 G/DL (ref 31.5–36.5)
MCV RBC AUTO: 89 FL (ref 78–100)
P AXIS - MUSE: 40 DEGREES
P AXIS - MUSE: 56 DEGREES
PLATELET # BLD AUTO: 235 10E3/UL (ref 150–450)
POTASSIUM SERPL-SCNC: 3.7 MMOL/L (ref 3.4–5.3)
POTASSIUM SERPL-SCNC: 4 MMOL/L (ref 3.4–5.3)
PR INTERVAL - MUSE: 194 MS
PR INTERVAL - MUSE: 202 MS
PROCALCITONIN SERPL IA-MCNC: 0.36 NG/ML
QRS DURATION - MUSE: 70 MS
QRS DURATION - MUSE: 80 MS
QT - MUSE: 322 MS
QT - MUSE: 344 MS
QTC - MUSE: 441 MS
QTC - MUSE: 465 MS
R AXIS - MUSE: -9 DEGREES
R AXIS - MUSE: 8 DEGREES
RBC # BLD AUTO: 3.33 10E6/UL (ref 3.8–5.2)
SODIUM SERPL-SCNC: 135 MMOL/L (ref 135–145)
SYSTOLIC BLOOD PRESSURE - MUSE: NORMAL MMHG
SYSTOLIC BLOOD PRESSURE - MUSE: NORMAL MMHG
T AXIS - MUSE: 111 DEGREES
T AXIS - MUSE: 93 DEGREES
UFH PPP CHRO-ACNC: 0.19 IU/ML
UFH PPP CHRO-ACNC: 0.4 IU/ML
UFH PPP CHRO-ACNC: 0.94 IU/ML
VENTRICULAR RATE- MUSE: 110 BPM
VENTRICULAR RATE- MUSE: 113 BPM
WBC # BLD AUTO: 10.7 10E3/UL (ref 4–11)

## 2024-12-08 PROCEDURE — 999N000208 ECHOCARDIOGRAM COMPLETE

## 2024-12-08 PROCEDURE — 250N000011 HC RX IP 250 OP 636: Performed by: STUDENT IN AN ORGANIZED HEALTH CARE EDUCATION/TRAINING PROGRAM

## 2024-12-08 PROCEDURE — 85520 HEPARIN ASSAY: CPT | Performed by: STUDENT IN AN ORGANIZED HEALTH CARE EDUCATION/TRAINING PROGRAM

## 2024-12-08 PROCEDURE — 93306 TTE W/DOPPLER COMPLETE: CPT | Mod: 26 | Performed by: INTERNAL MEDICINE

## 2024-12-08 PROCEDURE — 83605 ASSAY OF LACTIC ACID: CPT | Performed by: STUDENT IN AN ORGANIZED HEALTH CARE EDUCATION/TRAINING PROGRAM

## 2024-12-08 PROCEDURE — 99233 SBSQ HOSP IP/OBS HIGH 50: CPT | Performed by: INTERNAL MEDICINE

## 2024-12-08 PROCEDURE — 210N000001 HC R&B IMCU HEART CARE

## 2024-12-08 PROCEDURE — 80048 BASIC METABOLIC PNL TOTAL CA: CPT | Performed by: STUDENT IN AN ORGANIZED HEALTH CARE EDUCATION/TRAINING PROGRAM

## 2024-12-08 PROCEDURE — 36415 COLL VENOUS BLD VENIPUNCTURE: CPT | Performed by: INTERNAL MEDICINE

## 2024-12-08 PROCEDURE — 85027 COMPLETE CBC AUTOMATED: CPT | Performed by: STUDENT IN AN ORGANIZED HEALTH CARE EDUCATION/TRAINING PROGRAM

## 2024-12-08 PROCEDURE — 85520 HEPARIN ASSAY: CPT | Performed by: INTERNAL MEDICINE

## 2024-12-08 PROCEDURE — 250N000011 HC RX IP 250 OP 636: Performed by: INTERNAL MEDICINE

## 2024-12-08 PROCEDURE — 250N000009 HC RX 250: Performed by: STUDENT IN AN ORGANIZED HEALTH CARE EDUCATION/TRAINING PROGRAM

## 2024-12-08 PROCEDURE — 250N000011 HC RX IP 250 OP 636: Performed by: EMERGENCY MEDICINE

## 2024-12-08 PROCEDURE — 83735 ASSAY OF MAGNESIUM: CPT | Performed by: INTERNAL MEDICINE

## 2024-12-08 PROCEDURE — 36415 COLL VENOUS BLD VENIPUNCTURE: CPT | Performed by: STUDENT IN AN ORGANIZED HEALTH CARE EDUCATION/TRAINING PROGRAM

## 2024-12-08 PROCEDURE — 94640 AIRWAY INHALATION TREATMENT: CPT | Mod: 76

## 2024-12-08 PROCEDURE — 999N000157 HC STATISTIC RCP TIME EA 10 MIN

## 2024-12-08 PROCEDURE — 99222 1ST HOSP IP/OBS MODERATE 55: CPT | Mod: 25 | Performed by: INTERNAL MEDICINE

## 2024-12-08 PROCEDURE — C8929 TTE W OR WO FOL WCON,DOPPLER: HCPCS

## 2024-12-08 PROCEDURE — 84132 ASSAY OF SERUM POTASSIUM: CPT | Performed by: INTERNAL MEDICINE

## 2024-12-08 PROCEDURE — 250N000013 HC RX MED GY IP 250 OP 250 PS 637: Performed by: STUDENT IN AN ORGANIZED HEALTH CARE EDUCATION/TRAINING PROGRAM

## 2024-12-08 PROCEDURE — 250N000013 HC RX MED GY IP 250 OP 250 PS 637: Performed by: INTERNAL MEDICINE

## 2024-12-08 PROCEDURE — 84145 PROCALCITONIN (PCT): CPT | Performed by: INTERNAL MEDICINE

## 2024-12-08 PROCEDURE — 255N000002 HC RX 255 OP 636: Performed by: STUDENT IN AN ORGANIZED HEALTH CARE EDUCATION/TRAINING PROGRAM

## 2024-12-08 RX ORDER — HYDRALAZINE HYDROCHLORIDE 20 MG/ML
10 INJECTION INTRAMUSCULAR; INTRAVENOUS EVERY 4 HOURS PRN
Status: DISCONTINUED | OUTPATIENT
Start: 2024-12-08 | End: 2024-12-26 | Stop reason: HOSPADM

## 2024-12-08 RX ORDER — MAGNESIUM SULFATE HEPTAHYDRATE 40 MG/ML
4 INJECTION, SOLUTION INTRAVENOUS ONCE
Status: COMPLETED | OUTPATIENT
Start: 2024-12-08 | End: 2024-12-08

## 2024-12-08 RX ORDER — METOPROLOL TARTRATE 50 MG
50 TABLET ORAL 2 TIMES DAILY
Status: DISCONTINUED | OUTPATIENT
Start: 2024-12-08 | End: 2024-12-25

## 2024-12-08 RX ORDER — LABETALOL HYDROCHLORIDE 5 MG/ML
10 INJECTION, SOLUTION INTRAVENOUS
Status: DISCONTINUED | OUTPATIENT
Start: 2024-12-08 | End: 2024-12-26 | Stop reason: HOSPADM

## 2024-12-08 RX ORDER — LOSARTAN POTASSIUM 50 MG/1
50 TABLET ORAL DAILY
Status: DISCONTINUED | OUTPATIENT
Start: 2024-12-08 | End: 2024-12-26 | Stop reason: HOSPADM

## 2024-12-08 RX ORDER — FUROSEMIDE 10 MG/ML
40 INJECTION INTRAMUSCULAR; INTRAVENOUS EVERY 12 HOURS
Status: DISCONTINUED | OUTPATIENT
Start: 2024-12-08 | End: 2024-12-10

## 2024-12-08 RX ORDER — ZIPRASIDONE MESYLATE 20 MG/ML
10 INJECTION, POWDER, LYOPHILIZED, FOR SOLUTION INTRAMUSCULAR ONCE
Status: COMPLETED | OUTPATIENT
Start: 2024-12-08 | End: 2024-12-08

## 2024-12-08 RX ADMIN — INSULIN ASPART 2 UNITS: 100 INJECTION, SOLUTION INTRAVENOUS; SUBCUTANEOUS at 08:27

## 2024-12-08 RX ADMIN — SERTRALINE HYDROCHLORIDE 50 MG: 50 TABLET ORAL at 08:25

## 2024-12-08 RX ADMIN — ZIPRASIDONE MESYLATE 10 MG: 20 INJECTION, POWDER, LYOPHILIZED, FOR SOLUTION INTRAMUSCULAR at 02:48

## 2024-12-08 RX ADMIN — ALLOPURINOL 200 MG: 100 TABLET ORAL at 08:25

## 2024-12-08 RX ADMIN — IPRATROPIUM BROMIDE AND ALBUTEROL SULFATE 3 ML: .5; 3 SOLUTION RESPIRATORY (INHALATION) at 11:47

## 2024-12-08 RX ADMIN — IPRATROPIUM BROMIDE AND ALBUTEROL SULFATE 3 ML: .5; 3 SOLUTION RESPIRATORY (INHALATION) at 21:02

## 2024-12-08 RX ADMIN — AMLODIPINE BESYLATE 10 MG: 10 TABLET ORAL at 08:25

## 2024-12-08 RX ADMIN — ACETAMINOPHEN 650 MG: 325 TABLET, FILM COATED ORAL at 08:25

## 2024-12-08 RX ADMIN — SIMVASTATIN 20 MG: 20 TABLET, FILM COATED ORAL at 20:56

## 2024-12-08 RX ADMIN — FUROSEMIDE 40 MG: 10 INJECTION, SOLUTION INTRAMUSCULAR; INTRAVENOUS at 10:42

## 2024-12-08 RX ADMIN — PERFLUTREN 10 ML: 6.52 INJECTION, SUSPENSION INTRAVENOUS at 11:25

## 2024-12-08 RX ADMIN — METOPROLOL TARTRATE 50 MG: 50 TABLET, FILM COATED ORAL at 20:56

## 2024-12-08 RX ADMIN — INSULIN ASPART 1 UNITS: 100 INJECTION, SOLUTION INTRAVENOUS; SUBCUTANEOUS at 18:28

## 2024-12-08 RX ADMIN — CLOPIDOGREL BISULFATE 75 MG: 75 TABLET ORAL at 08:25

## 2024-12-08 RX ADMIN — AMLODIPINE BESYLATE 10 MG: 10 TABLET ORAL at 20:55

## 2024-12-08 RX ADMIN — FUROSEMIDE 40 MG: 10 INJECTION, SOLUTION INTRAMUSCULAR; INTRAVENOUS at 22:10

## 2024-12-08 RX ADMIN — IPRATROPIUM BROMIDE AND ALBUTEROL SULFATE 3 ML: .5; 3 SOLUTION RESPIRATORY (INHALATION) at 15:45

## 2024-12-08 RX ADMIN — LOSARTAN POTASSIUM 50 MG: 50 TABLET, FILM COATED ORAL at 13:33

## 2024-12-08 RX ADMIN — AZITHROMYCIN MONOHYDRATE 500 MG: 500 INJECTION, POWDER, LYOPHILIZED, FOR SOLUTION INTRAVENOUS at 15:22

## 2024-12-08 RX ADMIN — NITROGLYCERIN 70 MCG/MIN: 20 INJECTION INTRAVENOUS at 04:45

## 2024-12-08 RX ADMIN — INSULIN ASPART 1 UNITS: 100 INJECTION, SOLUTION INTRAVENOUS; SUBCUTANEOUS at 13:25

## 2024-12-08 RX ADMIN — ACETAMINOPHEN 650 MG: 325 TABLET, FILM COATED ORAL at 02:28

## 2024-12-08 RX ADMIN — HEPARIN SODIUM 800 UNITS/HR: 10000 INJECTION, SOLUTION INTRAVENOUS at 18:22

## 2024-12-08 RX ADMIN — METOPROLOL TARTRATE 50 MG: 50 TABLET, FILM COATED ORAL at 10:46

## 2024-12-08 RX ADMIN — MAGNESIUM SULFATE HEPTAHYDRATE 4 G: 40 INJECTION, SOLUTION INTRAVENOUS at 13:24

## 2024-12-08 RX ADMIN — CEFTRIAXONE SODIUM 2 G: 2 INJECTION, POWDER, FOR SOLUTION INTRAMUSCULAR; INTRAVENOUS at 16:41

## 2024-12-08 RX ADMIN — INSULIN GLARGINE 25 UNITS: 100 INJECTION, SOLUTION SUBCUTANEOUS at 22:04

## 2024-12-08 ASSESSMENT — ACTIVITIES OF DAILY LIVING (ADL)
ADLS_ACUITY_SCORE: 65
ADLS_ACUITY_SCORE: 67
ADLS_ACUITY_SCORE: 65
ADLS_ACUITY_SCORE: 60
ADLS_ACUITY_SCORE: 73
ADLS_ACUITY_SCORE: 60
ADLS_ACUITY_SCORE: 65
ADLS_ACUITY_SCORE: 65
ADLS_ACUITY_SCORE: 67
ADLS_ACUITY_SCORE: 60
ADLS_ACUITY_SCORE: 65
ADLS_ACUITY_SCORE: 67
ADLS_ACUITY_SCORE: 60
ADLS_ACUITY_SCORE: 67
ADLS_ACUITY_SCORE: 65
ADLS_ACUITY_SCORE: 60
ADLS_ACUITY_SCORE: 67
ADLS_ACUITY_SCORE: 65

## 2024-12-08 NOTE — PROGRESS NOTES
Alert to self only. Irritable and agitated, given Geodon and Heldol injection. Sitter at bedside.  VSS on 4 litters oxymask, saturation low 90s. BAE with infrequent expiratory wheezing. Blanchable redness on coccyx area, meplex dressing in placed. Generalized pain managed with Tylenol. External catheter with good output. Wheelchair bound at baseline, up with lift, Turn and reposition frequently. Lactic 2.6, (from 3.4) improving, MD updated. Nitroglycerin drip for BP control. Heparin infusing at 800 units/hr, Xa 10:00 am. Plan to consult cardiologist and wean oxygen of. Continue plan of care.

## 2024-12-08 NOTE — PROVIDER NOTIFICATION
MD Notification    Notified Person: MD    Notified Person Name: Dr. Bose     Notification Date/Time: 1527 12/8    Notification Interaction: vocera message     Purpose of Notification: Just FYI: I've noticed that pt is starting to have visual hallucinations this afternoon. She's pointing and saying there are people in her room when really no one is standing there.    Orders Received: PRN seroquel ordered     Comments:

## 2024-12-08 NOTE — CONSULTS
Cardiology Consultation      Jessika Garcia MRN# [id number]   YOB: 1950 Age: 74 year old   Date of Admission: 12/7/2024     Reason for consult: Non-ST elevation myocardial infarction           Assessment and Plan:     74-year-old female with a past medical history significant for hypertension, CVA with residual left-sided deficits, diabetes and a history of heart failure with preserved ejection fraction who was admitted to [facility] on 12/7/2024 with acute onset dyspnea and hypertension.    She was placed on a nitroglycerin drip to manage her hypertension and it was thought that her initial evaluation was consistent with heart failure with preserved ejection fraction.  She received IV furosemide with an appropriate response.    Cardiac troponins were noted to be elevated although they have been relatively flat in trajectory.  Cardiology consultation was requested.      IMPRESSION:    Heart failure with preserved ejection fraction  Severe hypertension.  History of CVA in the past with residual left-sided deficits.  Diabetes.    PLAN    -Continue diuretic therapy with furosemide 40 mg IV twice daily.  -An echocardiogram has been ordered and is pending.  -Depending on the echocardiographic results, an ischemic evaluation with Lexiscan stress perfusion may be appropriate.  -Will attempt to wean off nitroglycerin and substitute oral antihypertensive therapy.             Chief Complaint:   Shortness of Breath           History of Present Illness:   This patient is a 74 year old female with no known cardiac history although she did undergo a NENA in 2012 to evaluate for endocarditis which was within normal limits.  Her significant medical comorbidities include a history of diabetes, a CVA with left-sided deficits which is led to her being wheelchair-bound asthma and dyslipidemia.    She was admitted from her nursing home with worsening dyspnea on exertion and hypoxia.  Her evaluation was consistent with  "heart failure with preserved ejection fraction and she received diuretic therapy.  She was also noted to be significantly hypertensive and was started on IV nitroglycerin.         Physical Exam:   Vitals were reviewed  Blood pressure (!) 127/117, pulse 110, temperature 98  F (36.7  C), temperature source Oral, resp. rate 22, height 1.651 m (5' 5\"), weight 91.7 kg (202 lb 2.6 oz), SpO2 93%.  Temperatures:  Current - Temp: 98  F (36.7  C); Max - Temp  Av.9  F (36.6  C)  Min: 97.3  F (36.3  C)  Max: 98.5  F (36.9  C)  Respiration range: Resp  Av.9  Min: 11  Max: 30  Pulse range: Pulse  Av.3  Min: 95  Max: 112  Blood pressure range: Systolic (24hrs), Av , Min:130 , Max:205   ; Diastolic (24hrs), Av, Min:58, Max:132    Pulse oximetry range: SpO2  Av.8 %  Min: 90 %  Max: 99 %    Intake/Output Summary (Last 24 hours) at 2024 0854  Last data filed at 2024 0835  Gross per 24 hour   Intake 600 ml   Output 500 ml   Net 100 ml     Constitutional:   awake, alert, cooperative, no apparent distress, and appears stated age     Eyes:   Lids and lashes normal, pupils equal, round and reactive to light, extra ocular muscles intact, sclera clear, conjunctiva normal     Neck:   supple, symmetrical, trachea midline, no JVD     Back:   symmetric     Lungs:   Decreased breath sounds at bases       Cardiovascular:   Tachycardic but regular     Abdomen:   non-tender     Musculoskeletal:   motor strength is 5 out of 5 all extremities bilaterally     Neurologic:   Grossly nonfocal     Skin:   no bruising or bleeding     Additional findings:     +1 bilateral lower extremity edema                 Past Medical History:   I have reviewed this patient's past medical history  No past medical history on file.          Past Surgical History:   I have reviewed this patient's past surgical history  No past surgical history on file.            Social History:   I have reviewed this patient's social history  Social " History     Tobacco Use    Smoking status: Never    Smokeless tobacco: Not on file   Substance Use Topics    Alcohol use: No             Family History:   I have reviewed this patient's family history  No family history on file.          Allergies:   No Known Allergies          Medications:   I have reviewed this patient's current medications  Medications Prior to Admission   Medication Sig Dispense Refill Last Dose/Taking    acetaminophen (TYLENOL) 500 MG tablet Take 1,000 mg by mouth 3 times daily.   12/6/2024 Evening    allopurinol (ZYLOPRIM) 100 MG tablet Take 200 mg by mouth daily.   12/6/2024 Morning    amLODIPine (NORVASC) 10 MG tablet Take 10 mg by mouth 2 times daily.   12/6/2024 Evening    clopidogrel (PLAVIX) 75 MG tablet Take 75 mg by mouth daily.   12/6/2024 Morning    guaiFENesin (ROBITUSSIN) 20 mg/mL liquid Take 5 mLs by mouth every 4 hours as needed for cough.   Taking As Needed    HM LIDOCAINE PATCH EX Externally apply 1 patch topically daily. Remove every night   12/6/2024    insulin aspart (NOVOLOG FLEXPEN) 100 UNIT/ML pen Inject subcutaneously 3 times daily (with meals). Sliding scale:  If 250-300 = 2 units; If 301-350 = 4 units; If 351+ = 6 units   12/6/2024 Evening    insulin glargine (LANTUS PEN) 100 UNIT/ML pen Inject 25 Units subcutaneously at bedtime.   12/6/2024 Evening    loperamide (IMODIUM) 2 MG capsule Take 2 mg by mouth every 12 hours as needed for diarrhea.   Taking As Needed    metFORMIN (GLUCOPHAGE) 500 MG tablet Take 1,000 mg by mouth 2 times daily (with meals).   12/6/2024 Evening    metoprolol tartrate (LOPRESSOR) 50 MG tablet Take 50 mg by mouth 2 times daily.   12/6/2024 Evening    senna-docusate (SENOKOT-S/PERICOLACE) 8.6-50 MG tablet Take 1 tablet by mouth 2 times daily.   12/6/2024 Evening    sertraline (ZOLOFT) 50 MG tablet Take 50 mg by mouth daily.   12/6/2024 Morning    simvastatin (ZOCOR) 20 MG tablet Take 20 mg by mouth at bedtime.   12/6/2024 Evening    torsemide  (DEMADEX) 20 MG tablet Take 20 mg by mouth daily.   12/6/2024 Morning     Current Facility-Administered Medications   Medication Dose Route Frequency Provider Last Rate Last Admin    acetaminophen (TYLENOL) tablet 650 mg  650 mg Oral Q4H PRN Tee Linares MD   650 mg at 12/08/24 0825    Or    acetaminophen (TYLENOL) Suppository 650 mg  650 mg Rectal Q4H PRN Tee Linares MD        allopurinol (ZYLOPRIM) tablet 200 mg  200 mg Oral Daily Tee Linares MD   200 mg at 12/08/24 0825    amLODIPine (NORVASC) tablet 10 mg  10 mg Oral BID Tee Linares MD   10 mg at 12/08/24 0825    azithromycin (ZITHROMAX) 500 mg vial to attach to  mL bag  500 mg Intravenous Q24H Tee Linares MD        calcium carbonate (TUMS) chewable tablet 1,000 mg  1,000 mg Oral 4x Daily PRN Tee Linares MD        cefTRIAXone (ROCEPHIN) 2 g vial to attach to  ml bag for ADULTS or NS 50 ml bag for PEDS  2 g Intravenous Q24H Tee Lniares MD   2 g at 12/07/24 1755    clopidogrel (PLAVIX) tablet 75 mg  75 mg Oral Daily Tee Linares MD   75 mg at 12/08/24 0825    glucose gel 15-30 g  15-30 g Oral Q15 Min PRN Tee Linares MD        Or    dextrose 50 % injection 25-50 mL  25-50 mL Intravenous Q15 Min PRN Tee Linares MD        Or    glucagon injection 1 mg  1 mg Subcutaneous Q15 Min PRN Tee Linares MD        furosemide (LASIX) injection 40 mg  40 mg Intravenous Q12H Nakia Mandujano MD        haloperidol lactate (HALDOL) injection 2 mg  2 mg Intravenous Q6H PRN Tee Linares MD   2 mg at 12/07/24 2133    heparin 25,000 units in 0.45% NaCl 250 mL ANTICOAGULANT infusion  0-5,000 Units/hr Intravenous Continuous Tee Linares MD 8 mL/hr at 12/08/24 0833 800 Units/hr at 12/08/24 0833    insulin aspart (NovoLOG) injection (RAPID ACTING)  1-7 Units Subcutaneous TID AC Tee Linares MD   2 Units at 12/08/24 0827    insulin aspart (NovoLOG) injection (RAPID ACTING)  1-5 Units Subcutaneous At  Bedtime Tee Linares MD   3 Units at 12/07/24 2135    insulin glargine (LANTUS PEN) injection 25 Units  25 Units Subcutaneous At Bedtime Tee Linares MD   25 Units at 12/07/24 2135    ipratropium - albuterol 0.5 mg/2.5 mg/3 mL (DUONEB) neb solution 3 mL  3 mL Nebulization Q4H WA Tee Linares MD   3 mL at 12/07/24 1927    lidocaine (LMX4) cream   Topical Q1H PRN Tee Linares MD        lidocaine 1 % 0.1-1 mL  0.1-1 mL Other Q1H PRN Tee Linares MD        loperamide (IMODIUM) capsule 2 mg  2 mg Oral Q12H PRN Tee Linares MD        melatonin tablet 1 mg  1 mg Oral At Bedtime PRN Tee Linares MD   1 mg at 12/07/24 2107    metoprolol tartrate (LOPRESSOR) tablet 50 mg  50 mg Oral BID Gustavo Bose MD        nitroGLYcerin 50 mg in D5W 250 mL PERIPHERAL IV infusion   mcg/min Intravenous Continuous Tony Booth MD 21 mL/hr at 12/08/24 0833 70 mcg/min at 12/08/24 0833    polyethylene glycol (MIRALAX) Packet 17 g  17 g Oral Daily Tee Linares MD   17 g at 12/07/24 1713    senna-docusate (SENOKOT-S/PERICOLACE) 8.6-50 MG per tablet 1 tablet  1 tablet Oral BID Tee Montalvo MD        Or    senna-docusate (SENOKOT-S/PERICOLACE) 8.6-50 MG per tablet 2 tablet  2 tablet Oral BID Tee Montalvo MD        sertraline (ZOLOFT) tablet 50 mg  50 mg Oral Daily Tee Linares MD   50 mg at 12/08/24 0825    simvastatin (ZOCOR) tablet 20 mg  20 mg Oral At Bedtime Tee Linares MD   20 mg at 12/07/24 2107    sodium chloride (PF) 0.9% PF flush 3 mL  3 mL Intracatheter Q8H Tee Linares MD        sodium chloride (PF) 0.9% PF flush 3 mL  3 mL Intracatheter q1 min prn Tee Linares MD        sodium chloride (PF) 0.9% PF flush 3 mL  3 mL Intracatheter Q8H Tee Linares MD   3 mL at 12/07/24 1714             Review of Systems:     The 10 point Review of Systems is negative other than noted in the HPI            Data:   All laboratory data reviewed  Results for  orders placed or performed during the hospital encounter of 12/07/24 (from the past 24 hours)   Lactic acid whole blood   Result Value Ref Range    Lactic Acid 3.6 (H) 0.7 - 2.0 mmol/L   EKG 12 lead   Result Value Ref Range    Systolic Blood Pressure  mmHg    Diastolic Blood Pressure  mmHg    Ventricular Rate 100 BPM    Atrial Rate 100 BPM    MN Interval 190 ms    QRS Duration 74 ms     ms    QTc 448 ms    P Axis 68 degrees    R AXIS -11 degrees    T Axis 91 degrees    Interpretation ECG       Sinus rhythm  Anterior infarct , age undetermined  Abnormal ECG  No previous ECGs available  Confirmed by GENERATED REPORT, COMPUTER ([id number]),  NEO HERNANDEZ ([id number]) on 12/7/2024 3:40:36 PM     UA with Microscopic reflex to Culture    Specimen: Urine, Clean Catch   Result Value Ref Range    Color Urine Light Yellow Colorless, Straw, Light Yellow, Yellow    Appearance Urine Slightly Cloudy (A) Clear    Glucose Urine Negative Negative mg/dL    Bilirubin Urine Negative Negative    Ketones Urine Negative Negative mg/dL    Specific Gravity Urine 1.017 1.003 - 1.035    Blood Urine Negative Negative    pH Urine 5.5 5.0 - 7.0    Protein Albumin Urine 100 (A) Negative mg/dL    Urobilinogen Urine Normal Normal, 2.0 mg/dL    Nitrite Urine Negative Negative    Leukocyte Esterase Urine Moderate (A) Negative    Bacteria Urine Few (A) None Seen /HPF    RBC Urine 2 <=2 /HPF    WBC Urine 13 (H) <=5 /HPF    Squamous Epithelials Urine 5 (H) <=1 /HPF    Narrative    Urine Culture ordered based on laboratory criteria   Troponin T, High Sensitivity   Result Value Ref Range    Troponin T, High Sensitivity 220 (HH) <=14 ng/L   Glucose by meter   Result Value Ref Range    GLUCOSE BY METER POCT 234 (H) 70 - 99 mg/dL   Respiratory Panel PCR    Specimen: Nasopharyngeal; Swab   Result Value Ref Range    Adenovirus Not Detected Not Detected    Coronavirus Not Detected Not Detected    Human Metapneumovirus Not Detected Not Detected    Human  Rhin/Enterovirus Not Detected Not Detected    Influenza A Not Detected Not Detected    Influenza A, H1 Not Detected Not Detected    Influenza A 2009 H1N1 Not Detected Not Detected    Influenza A, H3 Not Detected Not Detected    Influenza B Not Detected Not Detected    Parainfluenza Virus 1 Not Detected Not Detected    Parainfluenza Virus 2 Not Detected Not Detected    Parainfluenza Virus 3 Not Detected Not Detected    Parainfluenza Virus 4 Not Detected Not Detected    Respiratory Syncytial Virus A Not Detected Not Detected    Respiratory Syncytial Virus B Not Detected Not Detected    Chlamydia Pneumoniae Not Detected Not Detected    Mycoplasma Pneumoniae Not Detected Not Detected    Narrative    The [facility] Respiratory Panel is a qualitative nucleic acid, multiplex, in vitro diagnostic test for the simultaneous detection and identification of multiple respiratory viral and bacterial nucleic acids in nasopharyngeal swabs collected in viral transport media from individual exhibiting signs and symptoms of respiratory infection. The assay has received FDA approval for the testing of nasopharyngeal (NP) swabs only. This test is used for clinical purposes and should not be regarded as investigational or for research. This laboratory is certified under the Clinical Laboratory Improvement Amendments of 1988 (CLIA-88) as qualified to perform high complexity clinical laboratory testing.   Troponin T, High Sensitivity   Result Value Ref Range    Troponin T, High Sensitivity 279 (HH) <=14 ng/L   Lactic acid whole blood   Result Value Ref Range    Lactic Acid 3.4 (H) 0.7 - 2.0 mmol/L   EKG 12-lead, tracing only   Result Value Ref Range    Systolic Blood Pressure  mmHg    Diastolic Blood Pressure  mmHg    Ventricular Rate 110 BPM    Atrial Rate 110 BPM    IL Interval 202 ms    QRS Duration 70 ms     ms    QTc 465 ms    P Axis 40 degrees    R AXIS 8 degrees    T Axis 93 degrees    Interpretation ECG       Sinus  tachycardia  Nonspecific ST and T wave abnormality  Abnormal ECG  When compared with ECG of 07-Dec-2024 13:13,  No significant change was found  Confirmed by MD TAYLOR MICHAEL ([id number]) on 12/8/2024 6:38:41 AM     EKG 12-lead, tracing only   Result Value Ref Range    Systolic Blood Pressure  mmHg    Diastolic Blood Pressure  mmHg    Ventricular Rate 113 BPM    Atrial Rate 113 BPM    SC Interval 194 ms    QRS Duration 80 ms     ms    QTc 441 ms    P Axis 56 degrees    R AXIS -9 degrees    T Axis 111 degrees    Interpretation ECG       Sinus tachycardia  possible  Inferior infarct , age undetermined  ST & T wave abnormality, consider lateral ischemia  Abnormal ECG  When compared with ECG of 07-Dec-2024 19:38, (unconfirmed)  no change  Confirmed by MD TAYLOR MICHAEL ([id number]) on 12/8/2024 6:46:04 AM     Troponin T, High Sensitivity   Result Value Ref Range    Troponin T, High Sensitivity 270 (HH) <=14 ng/L   CBC with platelets   Result Value Ref Range    WBC Count 11.6 (H) 4.0 - 11.0 10e3/uL    RBC Count 3.50 (L) 3.80 - 5.20 10e6/uL    Hemoglobin 10.0 (L) 11.7 - 15.7 g/dL    Hematocrit 31.8 (L) 35.0 - 47.0 %    MCV 91 78 - 100 fL    MCH 28.6 26.5 - 33.0 pg    MCHC 31.4 (L) 31.5 - 36.5 g/dL    RDW 15.5 (H) 10.0 - 15.0 %    Platelet Count 264 150 - 450 10e3/uL   Glucose by meter   Result Value Ref Range    GLUCOSE BY METER POCT 328 (H) 70 - 99 mg/dL   Glucose by meter   Result Value Ref Range    GLUCOSE BY METER POCT 287 (H) 70 - 99 mg/dL   Heparin Unfractionated Anti Xa Level   Result Value Ref Range    Anti Xa Unfractionated Heparin 0.94 For Reference Range, See Comment IU/mL    Narrative    Therapeutic Range: UFH: 0.25-0.50 IU/mL for low intensity dosing,  0.30-0.70 IU/mL for high intensity dosing DVT and PE.  This test is not validated for other direct factor X inhibitors (e.g. rivaroxaban, apixaban, edoxaban, betrixaban, fondaparinux) and should not be used for monitoring of other medications.   Basic  "metabolic panel   Result Value Ref Range    Sodium 135 135 - 145 mmol/L    Potassium 3.7 3.4 - 5.3 mmol/L    Chloride 96 (L) 98 - 107 mmol/L    Carbon Dioxide (CO2) 23 22 - 29 mmol/L    Anion Gap 16 (H) 7 - 15 mmol/L    Urea Nitrogen 29.9 (H) 8.0 - 23.0 mg/dL    Creatinine 0.77 0.51 - 0.95 mg/dL    GFR Estimate 80 >60 mL/min/1.73m2    Calcium 8.5 (L) 8.8 - 10.4 mg/dL    Glucose 276 (H) 70 - 99 mg/dL   CBC with platelets   Result Value Ref Range    WBC Count 10.7 4.0 - 11.0 10e3/uL    RBC Count 3.33 (L) 3.80 - 5.20 10e6/uL    Hemoglobin 9.5 (L) 11.7 - 15.7 g/dL    Hematocrit 29.7 (L) 35.0 - 47.0 %    MCV 89 78 - 100 fL    MCH 28.5 26.5 - 33.0 pg    MCHC 32.0 31.5 - 36.5 g/dL    RDW 15.3 (H) 10.0 - 15.0 %    Platelet Count 235 150 - 450 10e3/uL   Lactic acid whole blood   Result Value Ref Range    Lactic Acid 2.6 (H) 0.7 - 2.0 mmol/L   Procalcitonin   Result Value Ref Range    Procalcitonin 0.36 <0.50 ng/mL   Glucose by meter   Result Value Ref Range    GLUCOSE BY METER POCT 199 (H) 70 - 99 mg/dL     EKG results: Normal sinus rhythm with possible inferior Q waves and lateral ST-T wave abnormalities    Clinically Significant Risk Factors Present on Admission          # Hypochloremia: Lowest Cl = 96 mmol/L in last 2 days, will monitor as appropriate  # Hypocalcemia: Lowest Ca = 8.5 mg/dL in last 2 days, will monitor and replace as appropriate       # Drug Induced Platelet Defect: home medication list includes an antiplatelet medication          # Anemia: based on hgb <11      # DMII: A1C = 8.0 % (Ref range: <5.7 %) within past 6 months    # Obesity: Estimated body mass index is 33.64 kg/m  as calculated from the following:    Height as of this encounter: 1.651 m (5' 5\").    Weight as of this encounter: 91.7 kg (202 lb 2.6 oz).                           "
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
## 2024-12-08 NOTE — PLAN OF CARE
Goal Outcome Evaluation:    Plan of Care Reviewed With: patient    Overall Patient Progress: no change    Patient Name: Zelda  MRN: [id number]  Date of Admission: 12/7/2024    Vitals:   BP Readings from Last 1 Encounters:  12/07/24 : (!) 147/132    Pulse Readings from Last 1 Encounters:  12/07/24 : 108    Wt Readings from Last 1 Encounters:  12/07/24 : 89.8 kg (198 lb)    Temp Readings from Last 1 Encounters:  12/07/24 : 97.9  F (36.6  C) (Oral)    Assessment  Diet: Regular, BG ACHS  Resp: On 3L NC o2, LS exp wheezing, dyspnea with exertion.   Telemetry: ST w/ 1st deg AVHB  Neuro: A&O x3, disoriented to place. Orientation fluctuates. Tearful and hyperactive. Disorganized thinking. Sitter at bedside.   GI/: Incontinent of B/B. Purewick in place.   Skin/Wounds: Scattered bruising, blanchable redness to buttocks, under breast folds and abdominal folds. R foot big toe amputated.   Lines/Drains:1 PIV infusing nitroglycerin @70mcg/min, 1 PIV SL  Activity: Wheelchair bound baseline, Ax2 with lift.   Abnormal Labs: pending respiratory panel, lactic, and trop  On K+ and Mg replacement protocol.   Aggression Stop Light: Green          Patient Care Plan: sitter at bedside. Wean oxygen if able.

## 2024-12-08 NOTE — SIGNIFICANT EVENT
Significant Event Note    Time of event: 2:07 AM December 8, 2024    Description of event:  Agitated overnight    Plan:  Sam Farfan MD

## 2024-12-09 ENCOUNTER — APPOINTMENT (OUTPATIENT)
Dept: NUCLEAR MEDICINE | Facility: CLINIC | Age: 74
End: 2024-12-09
Attending: INTERNAL MEDICINE
Payer: COMMERCIAL

## 2024-12-09 LAB
ALBUMIN SERPL BCG-MCNC: 3.5 G/DL (ref 3.5–5.2)
ANION GAP SERPL CALCULATED.3IONS-SCNC: 12 MMOL/L (ref 7–15)
BASOPHILS # BLD AUTO: 0 10E3/UL (ref 0–0.2)
BASOPHILS NFR BLD AUTO: 0 %
BUN SERPL-MCNC: 33.2 MG/DL (ref 8–23)
CALCIUM SERPL-MCNC: 8.4 MG/DL (ref 8.8–10.4)
CHLORIDE SERPL-SCNC: 100 MMOL/L (ref 98–107)
CREAT SERPL-MCNC: 0.95 MG/DL (ref 0.51–0.95)
CV STRESS MAX HR HE: 82
EGFRCR SERPLBLD CKD-EPI 2021: 62 ML/MIN/1.73M2
EOSINOPHIL # BLD AUTO: 0.2 10E3/UL (ref 0–0.7)
EOSINOPHIL NFR BLD AUTO: 2 %
ERYTHROCYTE [DISTWIDTH] IN BLOOD BY AUTOMATED COUNT: 15.6 % (ref 10–15)
GLUCOSE BLDC GLUCOMTR-MCNC: 123 MG/DL (ref 70–99)
GLUCOSE BLDC GLUCOMTR-MCNC: 140 MG/DL (ref 70–99)
GLUCOSE BLDC GLUCOMTR-MCNC: 148 MG/DL (ref 70–99)
GLUCOSE BLDC GLUCOMTR-MCNC: 164 MG/DL (ref 70–99)
GLUCOSE BLDC GLUCOMTR-MCNC: 164 MG/DL (ref 70–99)
GLUCOSE SERPL-MCNC: 143 MG/DL (ref 70–99)
HCO3 SERPL-SCNC: 26 MMOL/L (ref 22–29)
HCT VFR BLD AUTO: 32.5 % (ref 35–47)
HGB BLD-MCNC: 10.7 G/DL (ref 11.7–15.7)
IMM GRANULOCYTES # BLD: 0.1 10E3/UL
IMM GRANULOCYTES NFR BLD: 1 %
LYMPHOCYTES # BLD AUTO: 1.5 10E3/UL (ref 0.8–5.3)
LYMPHOCYTES NFR BLD AUTO: 15 %
MAGNESIUM SERPL-MCNC: 2.4 MG/DL (ref 1.7–2.3)
MCH RBC QN AUTO: 30 PG (ref 26.5–33)
MCHC RBC AUTO-ENTMCNC: 32.9 G/DL (ref 31.5–36.5)
MCV RBC AUTO: 91 FL (ref 78–100)
MONOCYTES # BLD AUTO: 0.7 10E3/UL (ref 0–1.3)
MONOCYTES NFR BLD AUTO: 7 %
NEUTROPHILS # BLD AUTO: 7.5 10E3/UL (ref 1.6–8.3)
NEUTROPHILS NFR BLD AUTO: 75 %
NRBC # BLD AUTO: 0 10E3/UL
NRBC BLD AUTO-RTO: 0 /100
PHOSPHATE SERPL-MCNC: 5.2 MG/DL (ref 2.5–4.5)
PLATELET # BLD AUTO: 263 10E3/UL (ref 150–450)
POTASSIUM SERPL-SCNC: 3.9 MMOL/L (ref 3.4–5.3)
RATE PRESSURE PRODUCT: NORMAL
RBC # BLD AUTO: 3.57 10E6/UL (ref 3.8–5.2)
SODIUM SERPL-SCNC: 138 MMOL/L (ref 135–145)
STRESS ECHO BASELINE DIASTOLIC HE: 63
STRESS ECHO BASELINE HR: 75 BPM
STRESS ECHO BASELINE SYSTOLIC BP: 134
STRESS ECHO CALCULATED PERCENT HR: 57 %
STRESS ECHO LAST STRESS DIASTOLIC BP: 64
STRESS ECHO LAST STRESS SYSTOLIC BP: 142
STRESS ECHO TARGET HR: 145
UFH PPP CHRO-ACNC: 0.23 IU/ML
UFH PPP CHRO-ACNC: 0.32 IU/ML
WBC # BLD AUTO: 10.1 10E3/UL (ref 4–11)

## 2024-12-09 PROCEDURE — 999N000157 HC STATISTIC RCP TIME EA 10 MIN

## 2024-12-09 PROCEDURE — 85004 AUTOMATED DIFF WBC COUNT: CPT | Performed by: INTERNAL MEDICINE

## 2024-12-09 PROCEDURE — A9500 TC99M SESTAMIBI: HCPCS | Performed by: INTERNAL MEDICINE

## 2024-12-09 PROCEDURE — 93018 CV STRESS TEST I&R ONLY: CPT | Performed by: INTERNAL MEDICINE

## 2024-12-09 PROCEDURE — 99233 SBSQ HOSP IP/OBS HIGH 50: CPT | Mod: 25 | Performed by: INTERNAL MEDICINE

## 2024-12-09 PROCEDURE — 94640 AIRWAY INHALATION TREATMENT: CPT | Mod: 76

## 2024-12-09 PROCEDURE — 82310 ASSAY OF CALCIUM: CPT | Performed by: INTERNAL MEDICINE

## 2024-12-09 PROCEDURE — 250N000013 HC RX MED GY IP 250 OP 250 PS 637: Performed by: INTERNAL MEDICINE

## 2024-12-09 PROCEDURE — 85520 HEPARIN ASSAY: CPT | Performed by: INTERNAL MEDICINE

## 2024-12-09 PROCEDURE — 36415 COLL VENOUS BLD VENIPUNCTURE: CPT | Performed by: INTERNAL MEDICINE

## 2024-12-09 PROCEDURE — 250N000011 HC RX IP 250 OP 636: Performed by: STUDENT IN AN ORGANIZED HEALTH CARE EDUCATION/TRAINING PROGRAM

## 2024-12-09 PROCEDURE — 999N000049 HC STATISTIC ECHO STRESS OR NM NPI

## 2024-12-09 PROCEDURE — 85014 HEMATOCRIT: CPT | Performed by: INTERNAL MEDICINE

## 2024-12-09 PROCEDURE — 250N000009 HC RX 250: Performed by: STUDENT IN AN ORGANIZED HEALTH CARE EDUCATION/TRAINING PROGRAM

## 2024-12-09 PROCEDURE — 343N000001 HC RX 343 MED OP 636: Performed by: INTERNAL MEDICINE

## 2024-12-09 PROCEDURE — 94640 AIRWAY INHALATION TREATMENT: CPT

## 2024-12-09 PROCEDURE — 250N000013 HC RX MED GY IP 250 OP 250 PS 637: Performed by: STUDENT IN AN ORGANIZED HEALTH CARE EDUCATION/TRAINING PROGRAM

## 2024-12-09 PROCEDURE — 78452 HT MUSCLE IMAGE SPECT MULT: CPT

## 2024-12-09 PROCEDURE — 99233 SBSQ HOSP IP/OBS HIGH 50: CPT | Performed by: INTERNAL MEDICINE

## 2024-12-09 PROCEDURE — 210N000002 HC R&B HEART CARE

## 2024-12-09 PROCEDURE — 78452 HT MUSCLE IMAGE SPECT MULT: CPT | Mod: 26 | Performed by: INTERNAL MEDICINE

## 2024-12-09 PROCEDURE — 93017 CV STRESS TEST TRACING ONLY: CPT

## 2024-12-09 PROCEDURE — 250N000011 HC RX IP 250 OP 636: Performed by: INTERNAL MEDICINE

## 2024-12-09 PROCEDURE — 93016 CV STRESS TEST SUPVJ ONLY: CPT | Performed by: INTERNAL MEDICINE

## 2024-12-09 PROCEDURE — 83735 ASSAY OF MAGNESIUM: CPT | Performed by: INTERNAL MEDICINE

## 2024-12-09 RX ORDER — CAFFEINE CITRATE 20 MG/ML
60 SOLUTION INTRAVENOUS
Status: DISCONTINUED | OUTPATIENT
Start: 2024-12-09 | End: 2024-12-09

## 2024-12-09 RX ORDER — AMINOPHYLLINE 25 MG/ML
50-100 INJECTION, SOLUTION INTRAVENOUS
Status: DISCONTINUED | OUTPATIENT
Start: 2024-12-09 | End: 2024-12-09

## 2024-12-09 RX ORDER — REGADENOSON 0.08 MG/ML
0.4 INJECTION, SOLUTION INTRAVENOUS ONCE
Status: COMPLETED | OUTPATIENT
Start: 2024-12-09 | End: 2024-12-09

## 2024-12-09 RX ORDER — ALBUTEROL SULFATE 90 UG/1
2 INHALANT RESPIRATORY (INHALATION) EVERY 5 MIN PRN
Status: DISCONTINUED | OUTPATIENT
Start: 2024-12-09 | End: 2024-12-09

## 2024-12-09 RX ORDER — CAFFEINE 200 MG
200 TABLET ORAL
Status: DISCONTINUED | OUTPATIENT
Start: 2024-12-09 | End: 2024-12-09

## 2024-12-09 RX ORDER — NITROGLYCERIN 0.4 MG/1
0.4 TABLET SUBLINGUAL EVERY 5 MIN PRN
Status: DISCONTINUED | OUTPATIENT
Start: 2024-12-09 | End: 2024-12-09

## 2024-12-09 RX ORDER — SODIUM CHLORIDE 9 MG/ML
INJECTION, SOLUTION INTRAVENOUS CONTINUOUS
Status: DISCONTINUED | OUTPATIENT
Start: 2024-12-09 | End: 2024-12-09

## 2024-12-09 RX ORDER — LIDOCAINE 40 MG/G
CREAM TOPICAL
Status: DISCONTINUED | OUTPATIENT
Start: 2024-12-09 | End: 2024-12-09

## 2024-12-09 RX ADMIN — HEPARIN SODIUM 1100 UNITS/HR: 10000 INJECTION, SOLUTION INTRAVENOUS at 14:47

## 2024-12-09 RX ADMIN — FUROSEMIDE 40 MG: 10 INJECTION, SOLUTION INTRAMUSCULAR; INTRAVENOUS at 22:56

## 2024-12-09 RX ADMIN — QUETIAPINE 12.5 MG: 25 TABLET, FILM COATED ORAL at 00:29

## 2024-12-09 RX ADMIN — CLOPIDOGREL BISULFATE 75 MG: 75 TABLET ORAL at 10:48

## 2024-12-09 RX ADMIN — METOPROLOL TARTRATE 50 MG: 50 TABLET, FILM COATED ORAL at 10:48

## 2024-12-09 RX ADMIN — Medication 1 MG: at 00:29

## 2024-12-09 RX ADMIN — QUETIAPINE 12.5 MG: 25 TABLET, FILM COATED ORAL at 21:14

## 2024-12-09 RX ADMIN — AZITHROMYCIN MONOHYDRATE 500 MG: 500 INJECTION, POWDER, LYOPHILIZED, FOR SOLUTION INTRAVENOUS at 13:28

## 2024-12-09 RX ADMIN — INSULIN ASPART 1 UNITS: 100 INJECTION, SOLUTION INTRAVENOUS; SUBCUTANEOUS at 17:55

## 2024-12-09 RX ADMIN — IPRATROPIUM BROMIDE AND ALBUTEROL SULFATE 3 ML: .5; 3 SOLUTION RESPIRATORY (INHALATION) at 07:24

## 2024-12-09 RX ADMIN — HALOPERIDOL LACTATE 2 MG: 5 INJECTION, SOLUTION INTRAMUSCULAR at 12:29

## 2024-12-09 RX ADMIN — FUROSEMIDE 40 MG: 10 INJECTION, SOLUTION INTRAMUSCULAR; INTRAVENOUS at 10:49

## 2024-12-09 RX ADMIN — ALLOPURINOL 200 MG: 100 TABLET ORAL at 10:48

## 2024-12-09 RX ADMIN — IPRATROPIUM BROMIDE AND ALBUTEROL SULFATE 3 ML: .5; 3 SOLUTION RESPIRATORY (INHALATION) at 11:18

## 2024-12-09 RX ADMIN — ACETAMINOPHEN 650 MG: 325 TABLET, FILM COATED ORAL at 13:26

## 2024-12-09 RX ADMIN — METOPROLOL TARTRATE 50 MG: 50 TABLET, FILM COATED ORAL at 21:14

## 2024-12-09 RX ADMIN — Medication 32.4 MILLICURIE: at 10:25

## 2024-12-09 RX ADMIN — LOSARTAN POTASSIUM 50 MG: 50 TABLET, FILM COATED ORAL at 10:48

## 2024-12-09 RX ADMIN — SERTRALINE HYDROCHLORIDE 50 MG: 50 TABLET ORAL at 10:48

## 2024-12-09 RX ADMIN — REGADENOSON 0.4 MG: 0.08 INJECTION, SOLUTION INTRAVENOUS at 10:25

## 2024-12-09 RX ADMIN — AMLODIPINE BESYLATE 10 MG: 10 TABLET ORAL at 21:14

## 2024-12-09 RX ADMIN — SIMVASTATIN 20 MG: 20 TABLET, FILM COATED ORAL at 21:14

## 2024-12-09 RX ADMIN — AMLODIPINE BESYLATE 10 MG: 10 TABLET ORAL at 10:48

## 2024-12-09 RX ADMIN — INSULIN ASPART 1 UNITS: 100 INJECTION, SOLUTION INTRAVENOUS; SUBCUTANEOUS at 12:53

## 2024-12-09 RX ADMIN — IPRATROPIUM BROMIDE AND ALBUTEROL SULFATE 3 ML: .5; 3 SOLUTION RESPIRATORY (INHALATION) at 15:10

## 2024-12-09 RX ADMIN — HEPARIN SODIUM 1100 UNITS/HR: 10000 INJECTION, SOLUTION INTRAVENOUS at 12:36

## 2024-12-09 RX ADMIN — CEFTRIAXONE SODIUM 2 G: 2 INJECTION, POWDER, FOR SOLUTION INTRAMUSCULAR; INTRAVENOUS at 15:39

## 2024-12-09 RX ADMIN — IPRATROPIUM BROMIDE AND ALBUTEROL SULFATE 3 ML: .5; 3 SOLUTION RESPIRATORY (INHALATION) at 20:07

## 2024-12-09 RX ADMIN — POLYETHYLENE GLYCOL 3350 17 G: 17 POWDER, FOR SOLUTION ORAL at 10:49

## 2024-12-09 RX ADMIN — INSULIN GLARGINE 25 UNITS: 100 INJECTION, SOLUTION SUBCUTANEOUS at 21:14

## 2024-12-09 RX ADMIN — Medication 11.5 MILLICURIE: at 08:51

## 2024-12-09 ASSESSMENT — ACTIVITIES OF DAILY LIVING (ADL)
ADLS_ACUITY_SCORE: 82
ADLS_ACUITY_SCORE: 80
ADLS_ACUITY_SCORE: 81
ADLS_ACUITY_SCORE: 80
FALL_HISTORY_WITHIN_LAST_SIX_MONTHS: NO
DEPENDENT_IADLS:: CLEANING;COOKING;LAUNDRY;SHOPPING;MONEY MANAGEMENT;MEDICATION MANAGEMENT;MEAL PREPARATION;TRANSPORTATION;INCONTINENCE
ADLS_ACUITY_SCORE: 80
ADLS_ACUITY_SCORE: 80
ADLS_ACUITY_SCORE: 77
CHANGE_IN_FUNCTIONAL_STATUS_SINCE_ONSET_OF_CURRENT_ILLNESS/INJURY: NO
ADLS_ACUITY_SCORE: 80
ADLS_ACUITY_SCORE: 77
ADLS_ACUITY_SCORE: 80

## 2024-12-09 NOTE — PROGRESS NOTES
Alert to self only. VSS on 4L oximask. Pt denies SOB, but appears tachypneic and using abdominal muscles to breath. Weak, frequent cough. Tele SR/ST. Denies CP/SOB/pain. Up w/ lift. Pt refused all meals, except one piece of toast this afternoon, very poor oral intake. Plan to continue to diurese and IV ABX.
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
## 2024-12-09 NOTE — PROGRESS NOTES
"Cardiology Progress Note          Assessment and Plan:         74-year-old female with a past medical history significant for hypertension, CVA with residual left-sided deficits, diabetes and a history of heart failure with preserved ejection fraction who was admitted to [facility] on 2024 with acute onset dyspnea and hypertension.     She was placed on a nitroglycerin drip to manage her hypertension and it was thought that her initial evaluation was consistent with heart failure with preserved ejection fraction.  She received IV furosemide with an appropriate response.     Cardiac troponins were noted to be elevated although they have been relatively flat in trajectory.  An echocardiogram on 2024 demonstrated mildly reduced left ventricular systolic function with anteroseptal and distal inferior hypokinesis.     IMPRESSION:     Heart failure with mildly reduced ejection fraction.  Severe hypertension which has resolved with appropriate medical therapy.  History of CVA in the past with residual left-sided deficits.  Diabetes.     PLAN     -Continue IV diuretic therapy today.  -Given mild cardiomyopathy with elevated troponins, will pursue Lexiscan stress perfusion imaging today.  I would recommend medical therapy for presumed coronary artery disease in the absence of symptoms unless the findings are high risk.        Interval History:     Somnolent when seen today.             Review of Systems:   As per subjective, otherwise 5 systems reviewed and negative.           Physical Exam:   Blood pressure 105/49, pulse 67, temperature 97.6  F (36.4  C), temperature source Oral, resp. rate 20, height 1.651 m (5' 5\"), weight 91.7 kg (202 lb 2.6 oz), SpO2 95%.      Vital Sign Ranges  Temperature Temp  Av.2  F (36.8  C)  Min: 97.6  F (36.4  C)  Max: 98.8  F (37.1  C)   Blood pressure Systolic (24hrs), Av , Min:91 , Max:170        Diastolic (24hrs), Av, Min:45, Max:117      Pulse Pulse  Avg: " 83.7  Min: 63  Max: 111   Respirations Resp  Av  Min: 20  Max: 20   Pulse oximetry SpO2  Av.6 %  Min: 90 %  Max: 97 %         Intake/Output Summary (Last 24 hours) at 2024  Last data filed at 2024 1728  Gross per 24 hour   Intake 160 ml   Output 250 ml   Net -90 ml       Constitutional: Somnolent  Skin: Warm and dry  Neck: No JVD  Lungs: CTA  Cardiovascular: RRR, no m/r/g  Abdomen: Soft, non tender.  Extremities and Back: No LE edema  Neurological: Nonfocal           Medications:     I have reviewed this patient's current medications.              Data:     Labs reviewed.           Nakia Mandujano MD, M.D.

## 2024-12-09 NOTE — PLAN OF CARE
"Goal Outcome Evaluation:  [id number]  Neuro- x 1-2 disoriented to place,situation, and time. Agitated and restless at times. PRN melatonin and Seroquel given x1. Sitter at bedside overnight. Occasional hallucinations overnight   Most Recent Vitals- /49 (BP Location: Right arm)   Pulse 67   Temp 98.8  F (37.1  C) (Axillary)   Resp 20   Ht 1.651 m (5' 5\")   Wt 91.7 kg (202 lb 2.6 oz)   SpO2 94%   BMI 33.64 kg/m    Tele/Cardiac- SR   Resp- 3L NC/Oxymask intermittent dry nonproductive cough   Activity-2A lift, Q2 turns   Pain- denies   Drips- heparin infusing at 950 units/hr    GI/- incontinent of b/b, inaccurate I/O w/ external catheter   Labs pending   Blood sugar 181 and 164. Received insulin per orders   Diet: Combination Diet Regular Diet    Plan- continue iv abx and iv diuresis. Echo resulted to be reviewed by cardiology w/ possible need for stress test prior to discharge       "

## 2024-12-09 NOTE — PLAN OF CARE
Goal Outcome Evaluation:      Plan of Care Reviewed With: other (see comments) (Facility staff)          Outcome Evaluation: Return to Long Term Care at [facility] once Medically ready

## 2024-12-09 NOTE — PROGRESS NOTES
"Pt here in EKG dept for Lexiscan stress test. Arrives continuly moaning loudly.  No wheezes heard with lung sounds to the best of my ability.  Reports pain  \"on my top\" states yes when pointed to chest.  Rates at 8/10, pt is not restless, only verbal moaning.  Procedure explained.  No allergys, IV patent.    Lexiscan stress test complete.  Appeared to tolerate well.  No change- pt did not appear to become restless, some SOB maybe after injections.  Breathing returned to normal quickly.  VSS at baseline.    Pt returning to room via cart.  Report given to RN in room.  "

## 2024-12-09 NOTE — PROGRESS NOTES
Addendum to earlier cardiology note / late note:  Inferior infarct without significant ischemia on stress imaging study.   Pt seen fatigued/not fully oriented in room with sitter. Discussed results, rec for med mgmt in absence of chest pain. She is off nitroglycerin. Stop heparin. Continue IV Lasix. Will see again tomorrow.     NAVIN Seals [facility]

## 2024-12-09 NOTE — CONSULTS
Care Management Initial Consult    General Information  Assessment completed with: Care Team Member, Andi Landry  Type of CM/SW Visit: Initial Assessment    Primary Care Provider verified and updated as needed: Yes   Readmission within the last 30 days: no previous admission in last 30 days      Reason for Consult: discharge planning  Advance Care Planning: Advance Care Planning Reviewed: other (see comments) (No document, sister is support system)          Communication Assessment  Patient's communication style: spoken language (English or Bilingual)             Cognitive  Cognitive/Neuro/Behavioral: .WDL except  Level of Consciousness: alert, confused  Arousal Level: opens eyes spontaneously  Orientation: disoriented to, situation, place  Mood/Behavior: calm, cooperative  Best Language: 0 - No aphasia  Speech: clear, spontaneous    Living Environment:   People in home: facility resident     Current living Arrangements: extended care facility  Name of Facility: [facility]   Able to return to prior arrangements: yes  Living Arrangement Comments: Can return when medically ready    Family/Social Support:  Care provided by: self, other (see comments) (Facility Staff)  Provides care for: no one  Marital Status: Single  Support system: Facility resident(s)/Staff, Sibling(s)          Description of Support System: Supportive, Involved    Support Assessment: Adequate family and caregiver support, Adequate social supports    Current Resources:   Patient receiving home care services: No        Community Resources: None  Equipment currently used at home: lift device, wheelchair, manual  Supplies currently used at home: Diabetic Supplies    Employment/Financial:  Employment Status: retired        Financial Concerns: none   Referral to Financial Worker: No  Finance Comments: [facility] Medicare    Does the patient's insurance plan have a 3 day qualifying hospital stay waiver?  Yes     Which insurance plan 3 day  waiver is available? Alternative insurance waiver    Will the waiver be used for post-acute placement? Yes    Lifestyle & Psychosocial Needs:  Social Drivers of Health     Food Insecurity: Not on file   Depression: Not at risk (3/21/2022)    [facility]    PHQ-2     PHQ-2 TOTAL SCORE: 0   Housing Stability: Not on file   Tobacco Use: Low Risk  (5/2/2024)    Received from [facility]    Patient History     Smoking Tobacco Use: Never     Smokeless Tobacco Use: Never     Passive Exposure: Not on file   Financial Resource Strain: High Risk (12/28/2021)    [facility]    Financial Resource Strain     Difficulty of Paying Living Expenses: Not on file     Difficulty of Paying Living Expenses: Not on file   Alcohol Use: Not on file   Transportation Needs: Not on file   Physical Activity: Not on file   Interpersonal Safety: Not on file   Stress: Not on file   Social Connections: Unknown (1/3/2024)    [facility]    Social Connections     Frequency of Communication with Friends and Family: Not on file   Health Literacy: Not on file       Functional Status:  Prior to admission patient needed assistance:   Dependent ADLs:: Wheelchair-with assist, Transfers, Positioning, Incontinence, Grooming, Dressing, Bathing  Dependent IADLs:: Cleaning, Cooking, Laundry, Shopping, Money Management, Medication Management, Meal Preparation, Transportation, Incontinence  Assesssment of Functional Status: At functional baseline    Mental Health Status:  Mental Health Status: No Current Concerns       Chemical Dependency Status:  Chemical Dependency Status: No Current Concerns             Values/Beliefs:  Spiritual, Cultural Beliefs, Amish Practices, Values that affect care: no               Discussed  Partnership in Safe Discharge Planning  document with patient/family: No    Additional  "Information:  Per care management consult for discharge planning, chart was reviewed. Patient is a 74 year old female that presents to [facility] from [facility]. According to the H & P \"  history of HFpEF, HTN, CVA w/ L side deficits/ataxia/impaired mobility (wheelchair bound), asthma, T2DM on insulin, gout, HLD who was admitted on 12/7/2024 with AHRF and altered mental status\". Anticipated discharge date is in 2-3 days.     Per chart review, patient is oriented to self. Call placed to [facility] and spoke with TIMI Landry (ph: [phone]). She confirmed patient is a resident of their LTC and can return when medically ready. Patient has been residing at the LT since July of 2022. Patient is typically requiring an assist of 1 with lavonne lift at the facility. She uses a wheelchair and requires staff assist with pushing as she cannot self propel. Staff assist with all I/ADL's. Patient's family contact is her sister, Rosalia. There is no health care directive and facility does not have one. Patient see's [facility]. Kat Hudson with [facility] for her PCP. Chart updated. Gris confirmed patient can return when ready and we can update her once transport is found. Discharge orders can be faxed to [facility] via [employer] at [phone].     Attempted to contact Rosalia, however, when I dialed her home phone was updated that she was out of the home and would be returning in roughly an hour. Will try back later.       Addendum 1215: Call placed to Rosalia. Confirmed patient is from [facility] and they want her to return. Confirmed transport will need to be arranged at discharge. Rosalia confirmed patient has a health care directive and she will fax a copy to writer to get into her chart.   Next Steps: Arrange transport     DYLON Noe, LICSW  Social Work- Inpatient Care Coordination  [facility]   "

## 2024-12-10 LAB
ALBUMIN SERPL BCG-MCNC: 3.7 G/DL (ref 3.5–5.2)
ANION GAP SERPL CALCULATED.3IONS-SCNC: 12 MMOL/L (ref 7–15)
BASOPHILS # BLD AUTO: 0 10E3/UL (ref 0–0.2)
BASOPHILS NFR BLD AUTO: 1 %
BUN SERPL-MCNC: 40.8 MG/DL (ref 8–23)
CALCIUM SERPL-MCNC: 8.6 MG/DL (ref 8.8–10.4)
CHLORIDE SERPL-SCNC: 100 MMOL/L (ref 98–107)
CREAT SERPL-MCNC: 1.32 MG/DL (ref 0.51–0.95)
EGFRCR SERPLBLD CKD-EPI 2021: 42 ML/MIN/1.73M2
EOSINOPHIL # BLD AUTO: 0.3 10E3/UL (ref 0–0.7)
EOSINOPHIL NFR BLD AUTO: 3 %
ERYTHROCYTE [DISTWIDTH] IN BLOOD BY AUTOMATED COUNT: 16 % (ref 10–15)
GLUCOSE BLDC GLUCOMTR-MCNC: 126 MG/DL (ref 70–99)
GLUCOSE BLDC GLUCOMTR-MCNC: 160 MG/DL (ref 70–99)
GLUCOSE BLDC GLUCOMTR-MCNC: 185 MG/DL (ref 70–99)
GLUCOSE SERPL-MCNC: 124 MG/DL (ref 70–99)
HCO3 SERPL-SCNC: 26 MMOL/L (ref 22–29)
HCT VFR BLD AUTO: 36 % (ref 35–47)
HGB BLD-MCNC: 10.9 G/DL (ref 11.7–15.7)
IMM GRANULOCYTES # BLD: 0.1 10E3/UL
IMM GRANULOCYTES NFR BLD: 1 %
LYMPHOCYTES # BLD AUTO: 1.4 10E3/UL (ref 0.8–5.3)
LYMPHOCYTES NFR BLD AUTO: 16 %
MAGNESIUM SERPL-MCNC: 2.7 MG/DL (ref 1.7–2.3)
MCH RBC QN AUTO: 28.7 PG (ref 26.5–33)
MCHC RBC AUTO-ENTMCNC: 30.3 G/DL (ref 31.5–36.5)
MCV RBC AUTO: 95 FL (ref 78–100)
MONOCYTES # BLD AUTO: 0.6 10E3/UL (ref 0–1.3)
MONOCYTES NFR BLD AUTO: 7 %
NEUTROPHILS # BLD AUTO: 6.4 10E3/UL (ref 1.6–8.3)
NEUTROPHILS NFR BLD AUTO: 72 %
NRBC # BLD AUTO: 0 10E3/UL
NRBC BLD AUTO-RTO: 0 /100
PHOSPHATE SERPL-MCNC: 6.6 MG/DL (ref 2.5–4.5)
PLATELET # BLD AUTO: 279 10E3/UL (ref 150–450)
POTASSIUM SERPL-SCNC: 4.2 MMOL/L (ref 3.4–5.3)
RBC # BLD AUTO: 3.8 10E6/UL (ref 3.8–5.2)
SODIUM SERPL-SCNC: 138 MMOL/L (ref 135–145)
WBC # BLD AUTO: 8.9 10E3/UL (ref 4–11)

## 2024-12-10 PROCEDURE — 250N000011 HC RX IP 250 OP 636: Performed by: INTERNAL MEDICINE

## 2024-12-10 PROCEDURE — 85014 HEMATOCRIT: CPT | Performed by: INTERNAL MEDICINE

## 2024-12-10 PROCEDURE — 250N000013 HC RX MED GY IP 250 OP 250 PS 637: Performed by: INTERNAL MEDICINE

## 2024-12-10 PROCEDURE — 210N000002 HC R&B HEART CARE

## 2024-12-10 PROCEDURE — 99233 SBSQ HOSP IP/OBS HIGH 50: CPT | Performed by: INTERNAL MEDICINE

## 2024-12-10 PROCEDURE — 250N000013 HC RX MED GY IP 250 OP 250 PS 637: Performed by: STUDENT IN AN ORGANIZED HEALTH CARE EDUCATION/TRAINING PROGRAM

## 2024-12-10 PROCEDURE — 85004 AUTOMATED DIFF WBC COUNT: CPT | Performed by: INTERNAL MEDICINE

## 2024-12-10 PROCEDURE — 999N000157 HC STATISTIC RCP TIME EA 10 MIN

## 2024-12-10 PROCEDURE — 83735 ASSAY OF MAGNESIUM: CPT | Performed by: INTERNAL MEDICINE

## 2024-12-10 PROCEDURE — 250N000009 HC RX 250: Performed by: STUDENT IN AN ORGANIZED HEALTH CARE EDUCATION/TRAINING PROGRAM

## 2024-12-10 PROCEDURE — 36415 COLL VENOUS BLD VENIPUNCTURE: CPT | Performed by: INTERNAL MEDICINE

## 2024-12-10 PROCEDURE — 82435 ASSAY OF BLOOD CHLORIDE: CPT | Performed by: INTERNAL MEDICINE

## 2024-12-10 PROCEDURE — 94640 AIRWAY INHALATION TREATMENT: CPT | Mod: 76

## 2024-12-10 PROCEDURE — 250N000011 HC RX IP 250 OP 636: Performed by: STUDENT IN AN ORGANIZED HEALTH CARE EDUCATION/TRAINING PROGRAM

## 2024-12-10 PROCEDURE — 94640 AIRWAY INHALATION TREATMENT: CPT

## 2024-12-10 RX ORDER — HEPARIN SODIUM 5000 [USP'U]/.5ML
5000 INJECTION, SOLUTION INTRAVENOUS; SUBCUTANEOUS EVERY 8 HOURS
Status: DISCONTINUED | OUTPATIENT
Start: 2024-12-10 | End: 2024-12-21

## 2024-12-10 RX ORDER — FUROSEMIDE 10 MG/ML
20 INJECTION INTRAMUSCULAR; INTRAVENOUS DAILY
Status: DISCONTINUED | OUTPATIENT
Start: 2024-12-11 | End: 2024-12-11

## 2024-12-10 RX ADMIN — IPRATROPIUM BROMIDE AND ALBUTEROL SULFATE 3 ML: .5; 3 SOLUTION RESPIRATORY (INHALATION) at 19:44

## 2024-12-10 RX ADMIN — QUETIAPINE 12.5 MG: 25 TABLET, FILM COATED ORAL at 21:14

## 2024-12-10 RX ADMIN — HALOPERIDOL LACTATE 2 MG: 5 INJECTION, SOLUTION INTRAMUSCULAR at 12:19

## 2024-12-10 RX ADMIN — LOSARTAN POTASSIUM 50 MG: 50 TABLET, FILM COATED ORAL at 10:05

## 2024-12-10 RX ADMIN — HALOPERIDOL LACTATE 2 MG: 5 INJECTION, SOLUTION INTRAMUSCULAR at 17:45

## 2024-12-10 RX ADMIN — AMLODIPINE BESYLATE 10 MG: 10 TABLET ORAL at 21:14

## 2024-12-10 RX ADMIN — POLYETHYLENE GLYCOL 3350 17 G: 17 POWDER, FOR SOLUTION ORAL at 09:52

## 2024-12-10 RX ADMIN — FUROSEMIDE 40 MG: 10 INJECTION, SOLUTION INTRAMUSCULAR; INTRAVENOUS at 12:18

## 2024-12-10 RX ADMIN — Medication 1 MG: at 21:14

## 2024-12-10 RX ADMIN — HALOPERIDOL LACTATE 2 MG: 5 INJECTION, SOLUTION INTRAMUSCULAR at 00:17

## 2024-12-10 RX ADMIN — HEPARIN SODIUM 5000 UNITS: 5000 INJECTION, SOLUTION INTRAVENOUS; SUBCUTANEOUS at 17:40

## 2024-12-10 RX ADMIN — ACETAMINOPHEN 650 MG: 325 TABLET, FILM COATED ORAL at 12:18

## 2024-12-10 RX ADMIN — INSULIN GLARGINE 15 UNITS: 100 INJECTION, SOLUTION SUBCUTANEOUS at 21:16

## 2024-12-10 RX ADMIN — SIMVASTATIN 20 MG: 20 TABLET, FILM COATED ORAL at 21:14

## 2024-12-10 RX ADMIN — ACETAMINOPHEN 650 MG: 650 SUPPOSITORY RECTAL at 00:25

## 2024-12-10 RX ADMIN — ALLOPURINOL 200 MG: 100 TABLET ORAL at 09:49

## 2024-12-10 RX ADMIN — CEFTRIAXONE SODIUM 2 G: 2 INJECTION, POWDER, FOR SOLUTION INTRAMUSCULAR; INTRAVENOUS at 16:09

## 2024-12-10 RX ADMIN — AMLODIPINE BESYLATE 10 MG: 10 TABLET ORAL at 09:49

## 2024-12-10 RX ADMIN — METOPROLOL TARTRATE 50 MG: 50 TABLET, FILM COATED ORAL at 09:49

## 2024-12-10 RX ADMIN — IPRATROPIUM BROMIDE AND ALBUTEROL SULFATE 3 ML: .5; 3 SOLUTION RESPIRATORY (INHALATION) at 10:56

## 2024-12-10 RX ADMIN — METOPROLOL TARTRATE 50 MG: 50 TABLET, FILM COATED ORAL at 21:14

## 2024-12-10 RX ADMIN — SERTRALINE HYDROCHLORIDE 50 MG: 50 TABLET ORAL at 09:49

## 2024-12-10 RX ADMIN — CLOPIDOGREL BISULFATE 75 MG: 75 TABLET ORAL at 09:49

## 2024-12-10 ASSESSMENT — ACTIVITIES OF DAILY LIVING (ADL)
ADLS_ACUITY_SCORE: 81
ADLS_ACUITY_SCORE: 77
ADLS_ACUITY_SCORE: 81
ADLS_ACUITY_SCORE: 84
ADLS_ACUITY_SCORE: 77
ADLS_ACUITY_SCORE: 84

## 2024-12-10 NOTE — PROGRESS NOTES
Care Management Follow Up    Length of Stay (days): 3    Expected Discharge Date: 12/13/2024     Concerns to be Addressed: discharge planning  Return to [facility]  Patient plan of care discussed at interdisciplinary rounds: Yes    Anticipated Discharge Disposition: Transitional Care  Disposition Comments: Return to Long Term care           Anticipated Discharge Services: None  Anticipated Discharge DME: None    Patient/family educated on Medicare website which has current facility and service quality ratings: no  Education Provided on the Discharge Plan: No  Patient/Family in Agreement with the Plan: yes    Referrals Placed by CM/TIMI: Post Acute Facilities  Private pay costs discussed: Not applicable    Discussed  Partnership in Safe Discharge Planning  document with patient/family: No     Handoff Completed: No, handoff not indicated or clinically appropriate    Additional Information:  VM forwarded to writer asking to call TIMI Landry with [facility] regarding an update on patient. Call placed to Gris at [phone] and updated her on current plan. Writer will update Gris once patient is more medically stable for discharge.     Next Steps: Continue to follow      DYLON Noe, LICSW  Social Work- Inpatient Care Coordination  [facility]

## 2024-12-10 NOTE — PLAN OF CARE
Heart Failure Care Map  GOALS TO BE MET BEFORE DISCHARGE:    1. Decrease congestion and/or edema with diuretic therapy to achieve near optimal volume status.     Dyspnea improved: No, further care required to meet this goal. Please explain Pt is on an Oxymask at 6L   Edema improved: No, further care required to meet this goal. Please explain 1+LE edema        Last 24 hour I/O:   Intake/Output Summary (Last 24 hours) at 12/10/2024 0616  Last data filed at 12/9/2024 2100  Gross per 24 hour   Intake 320 ml   Output 50 ml   Net 270 ml           Net I/O and Weights since admission:   11/10 0700 - 12/10 0659  In: 980 [P.O.:880]  Out: 800 [Urine:800]  Net: 180     Vitals:    12/07/24 0948 12/07/24 2100 12/10/24 0500   Weight: 89.8 kg (198 lb) 91.7 kg (202 lb 2.6 oz) 91.7 kg (202 lb 2.6 oz)       2.  O2 sats > 90% on room air, or at prior home O2 therapy level.      Able to wean O2 this shift to keep sats above 90%?: No, further care required to meet this goal. Please explain Oxymask at 6l   Does patient use Home O2? No          Current oxygenation status:   SpO2: 94 %     O2 Device: Oxymask, Oxygen Delivery: 6 LPM    3.  Tolerates ambulation and mobility near baseline.     Ambulation: No, further care required to meet this goal. Please explain WC bound   Times patient ambulated with staff this shift: 0    Please review the Heart Failure Care Map for additional HF goal outcomes.  Pt is alert to self only and was very agitated at the start of the shift pulling her O2 mask off and dipping into the low 80's on RA.  She received PRN Haldol with good results.  Urinary out put has been low, she has 1 small amount of urinary incontinence and this morning a bladder scan showed 160ML's in her bladder.  She denies needing to urinate.  Tele:SB/SR.  Plan of care is on going.  Christiano Stone RN  12/10/2024
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
## 2024-12-10 NOTE — PROGRESS NOTES
"Cardiology Progress Note          Assessment and Plan:         74-year-old female with a past medical history significant for hypertension, CVA with residual left-sided deficits, diabetes and a history of heart failure with preserved ejection fraction who was admitted to [facility] on 2024 with acute onset dyspnea and hypertension.     Cardiac troponins were noted to be elevated although they have been relatively flat in trajectory.  An echocardiogram on 2024 demonstrated mildly reduced left ventricular systolic function with anteroseptal and distal inferior hypokinesis.  Stress perfusion imaging yesterday demonstrated an inferior infarct without any significant ischemia.    IMPRESSION:     Heart failure with mildly reduced ejection fraction.  Severe hypertension which has resolved with appropriate medical therapy.  History of CVA in the past with residual left-sided deficits.  Altered mental status secondary to presumed toxic encephalopathy.     PLAN     -Creatinine has increased to 1.32 today, may need to consider decreasing IV furosemide versus transitioning to p.o.  -Her echocardiographic and stress perfusion findings are suggestive of an inferior infarct without any significant ischemia.  In the absence of chest discomfort I would continue diuretic therapy and treat her medically for presumed coronary artery disease.  -Hopefully her mental status will clear and permit an informed conversation regarding these issues.        Interval History:     Awake but appears agitated and reporting headache and generalized body discomfort.             Review of Systems:   As per subjective, otherwise 5 systems reviewed and negative.           Physical Exam:   Blood pressure 121/55, pulse 55, temperature 98  F (36.7  C), temperature source Oral, resp. rate 16, height 1.651 m (5' 5\"), weight 91.7 kg (202 lb 2.6 oz), SpO2 94%.      Vital Sign Ranges  Temperature Temp  Av.2  F (36.8  C)  Min: 97.6  F (36.4 "  C)  Max: 98.8  F (37.1  C)   Blood pressure Systolic (24hrs), Av , Min:91 , Max:170        Diastolic (24hrs), Av, Min:45, Max:117      Pulse Pulse  Av.7  Min: 63  Max: 111   Respirations Resp  Av  Min: 20  Max: 20   Pulse oximetry SpO2  Av.6 %  Min: 90 %  Max: 97 %         Intake/Output Summary (Last 24 hours) at 2024 0824  Last data filed at 2024 1728  Gross per 24 hour   Intake 160 ml   Output 250 ml   Net -90 ml       Constitutional: Somnolent  Skin: Warm and dry  Neck: No JVD  Lungs: CTA  Cardiovascular: RRR, no m/r/g  Abdomen: Soft, non tender.  Extremities and Back: No LE edema  Neurological: Nonfocal           Medications:     I have reviewed this patient's current medications.              Data:     Labs reviewed.           Nakia Mandujano MD, M.D.

## 2024-12-10 NOTE — PROGRESS NOTES
"CLINICAL NUTRITION SERVICES  -  ASSESSMENT NOTE    Recommendations Ordered by Registered Dietitian (RD):   Ordered berry Nepro at 10am   Malnutrition:   % Weight Loss:  Weight loss does not meet criteria for malnutrition  % Intake:  PETEY  Subcutaneous Fat Loss:  Orbital region mild-moderate depletion (only 1 indicator, not using as criteria)  Muscle Loss:  Temporal region mild-moderate depletion, Clavicle mild depletion  Fluid Retention:  trace    Malnutrition Diagnosis: Unable to determine due to lack of nutrition hx     REASON FOR ASSESSMENT  Jessika Garcia is a 74 year old female seen by Registered Dietitian for Admission Nutrition Risk Screen for answering \"unsure\" to recently losing weight without trying and \"yes\" to recently eating poorly d/t a decrease in appetite.    PMH of HFpEF, HTN, CVA w/ L side deficits/ataxia/impaired mobility (wheelchair bound), asthma, T2DM on insulin, gout, HLD. Presents with altered mental status, possible NSTEMI, acute on chronic heart failure.    NUTRITION HISTORY    - Attempted to speak with patient at bedside however seemed quite disoriented. She did say she would be open to a protein shake to help meet nutritional needs, \"strawberry.\" She said she was \"not sure\" if she has lost any weight recently.    CURRENT NUTRITION ORDERS  Diet Order: Regular     Current Intake/Tolerance:  - Was eating breakfast at time of visit - scrambled eggs, toast, fruit cup, banana, coffee. Was only about 15% of the way through by the time writer left.  - Per RN in room, he heard patient did not eat well yesterday. Looks like flowsheets show 0-25% intakes + poor/no appetite + refused a meal.    NUTRITION FOCUSED PHYSICAL ASSESSMENT FOR DIAGNOSING MALNUTRITION)  Yes - visual         Observed:    Muscle wasting (refer to documentation in Malnutrition section) and Subcutaneous fat loss (refer to documentation in Malnutrition section)    ANTHROPOMETRICS  Height: 5' 5\"  Weight: 91.7 kg, 202 lbs 2.59 oz  Body " mass index is 33.64 kg/m .  Weight Status:  Obesity Grade I BMI 30-34.9  IBW: 56.8 kg  % IBW: 161%  Weight History: no notable wt loss, 3% in 10 months  Wt Readings from Last 10 Encounters:   12/10/24 91.7 kg (202 lb 2.6 oz)     Per Care Everywhere:   86.6 kg (191 lb) 11/11/2024   93.4 kg (206 lb) 04/25/2024   94.6 kg (208 lb 8 oz) 04/18/2024     LABS  BUN 40.8 (H)  Cr 1.32 (H)  Mg 2.7 (H)  Phos 6.6 (H)  -164 (H)    MEDICATIONS  Lasix  Medium sliding scale insulin  25 units lantus  Miralax    ASSESSED NUTRITION NEEDS PER APPROVED PRACTICE GUIDELINES:  Dosing Weight 65.5 kg (adjusted)  Estimated Energy Needs: 5230-0014 kcals (25-30 Kcal/Kg)  Justification: maintenance  Estimated Protein Needs: 79-98 grams protein (1.2-1.5 g pro/Kg)  Justification: preservation of lean body mass  Estimated Fluid Needs: 1984-9600 mL (1 mL/Kcal)  Justification: maintenance or per MD    NUTRITION DIAGNOSIS:  Inadequate oral intake related to reduced appetite as evidenced by patient frequently refusing meals, some days only 0-25% intakes, need for ONS to help meet nutritional needs    NUTRITION INTERVENTIONS  Recommendations / Nutrition Prescription  See above    Implementation  Nutrition education: Not appropriate at this time due to patient condition  Medical Food Supplement - ordered    Nutrition Goals  Patient to consume at least 50-75% of meals and 1 supplement daily    MONITORING AND EVALUATION:  Progress towards goals will be monitored and evaluated per protocol and Practice Guidelines    Cristina Lui RD, LD  Clinical Dietitian - [facility]

## 2024-12-10 NOTE — PROGRESS NOTES
Scheduled nebs given as ordered.  Held when PT was sleeping.  RT will continue to assess and treat.

## 2024-12-10 NOTE — PLAN OF CARE
Goal Outcome Evaluation:      Plan of Care Reviewed With: patient        Alert to self, frequently moaning loudly. Agitated and restless at times. Denies pain. Up A-2 with lift. VSS, O2 stable on 4L oxymask, frequently pulling O2 mask off. Tele: SR. Denies shortness of breath. Lexiscan stress test this AM. Poor appetite, apple juice with morning meds, refused lunch and dinner. Hep gtt stopped per cardiology. Plan of care ongoing.

## 2024-12-10 NOTE — PLAN OF CARE
Goal Outcome Evaluation:      Plan of Care Reviewed With: patient    Overall Patient Progress: improvingOverall Patient Progress: improving     Heart Failure Care Map  GOALS TO BE MET BEFORE DISCHARGE:    1. Decrease congestion and/or edema with diuretic therapy to achieve near optimal volume status.     Dyspnea improved: No, further care required to meet this goal. Please explain 4 L oximizer   Edema improved: Yes, satisfactory for discharge.        Last 24 hour I/O:   Intake/Output Summary (Last 24 hours) at 12/9/2024 2235  Last data filed at 12/9/2024 2100  Gross per 24 hour   Intake 320 ml   Output 50 ml   Net 270 ml           Net I/O and Weights since admission:   11/09 2300 - 12/09 2259  In: 980 [P.O.:880]  Out: 800 [Urine:800]  Net: 180     Vitals:    12/07/24 0948 12/07/24 2100   Weight: 89.8 kg (198 lb) 91.7 kg (202 lb 2.6 oz)       2.  O2 sats > 90% on room air, or at prior home O2 therapy level.      Able to wean O2 this shift to keep sats above 90%?: No, further care required to meet this goal. Please explain 4 L Oximizer   Does patient use Home O2? No          Current oxygenation status:   SpO2: 97 %     O2 Device: Nasal cannula, Oxygen Delivery: 4 LPM    3.  Tolerates ambulation and mobility near baseline.     Ambulation: No, further care required to meet this goal. Please explain WC bound at baseline   Times patient ambulated with staff this shift: 0    Please review the Heart Failure Care Map for additional HF goal outcomes.    Modesta Miller RN  12/9/2024   Neuro:oriented to place, person, moans in sleep  CV/Rhythm:SR  Resp/02:4 L oximizer  GI/Diet:regular diet  :DTV, pure wick in place, 50 ml out, bladder scan for 73 ml, IV lasix given  Skin/Incisions/Sites:pale, abrasion at base of coccynx, mepilex in place, turned q2hr, heels elevated  Pulses/CMS:intact  Edema:BLE 1+  Activity/Falls Risk:fall risk, WC bound at baseline  Lines/Drains/IVs:PIV x 2  Labs/BGM:, k and mag in  am  Test/Procedures:nuc stress today, med treat  VS/Pain:stable, denies pain  DC Plan:?  Other:cards and social work following

## 2024-12-11 LAB
ALBUMIN SERPL BCG-MCNC: 3.4 G/DL (ref 3.5–5.2)
ALP SERPL-CCNC: 77 U/L (ref 40–150)
ALT SERPL W P-5'-P-CCNC: 19 U/L (ref 0–50)
AMMONIA PLAS-SCNC: 25 UMOL/L (ref 11–51)
ANION GAP SERPL CALCULATED.3IONS-SCNC: 12 MMOL/L (ref 7–15)
AST SERPL W P-5'-P-CCNC: 35 U/L (ref 0–45)
BILIRUB DIRECT SERPL-MCNC: <0.2 MG/DL (ref 0–0.3)
BILIRUB SERPL-MCNC: <0.2 MG/DL
BUN SERPL-MCNC: 50.8 MG/DL (ref 8–23)
CALCIUM SERPL-MCNC: 8.4 MG/DL (ref 8.8–10.4)
CHLORIDE SERPL-SCNC: 100 MMOL/L (ref 98–107)
CREAT SERPL-MCNC: 1.56 MG/DL (ref 0.51–0.95)
EGFRCR SERPLBLD CKD-EPI 2021: 34 ML/MIN/1.73M2
ERYTHROCYTE [DISTWIDTH] IN BLOOD BY AUTOMATED COUNT: 15.8 % (ref 10–15)
GLUCOSE BLDC GLUCOMTR-MCNC: 137 MG/DL (ref 70–99)
GLUCOSE BLDC GLUCOMTR-MCNC: 146 MG/DL (ref 70–99)
GLUCOSE BLDC GLUCOMTR-MCNC: 146 MG/DL (ref 70–99)
GLUCOSE BLDC GLUCOMTR-MCNC: 160 MG/DL (ref 70–99)
GLUCOSE SERPL-MCNC: 134 MG/DL (ref 70–99)
HCO3 SERPL-SCNC: 25 MMOL/L (ref 22–29)
HCT VFR BLD AUTO: 32.4 % (ref 35–47)
HGB BLD-MCNC: 10 G/DL (ref 11.7–15.7)
LACTATE SERPL-SCNC: 1.2 MMOL/L (ref 0.7–2)
MAGNESIUM SERPL-MCNC: 2.7 MG/DL (ref 1.7–2.3)
MCH RBC QN AUTO: 28.7 PG (ref 26.5–33)
MCHC RBC AUTO-ENTMCNC: 30.9 G/DL (ref 31.5–36.5)
MCV RBC AUTO: 93 FL (ref 78–100)
NT-PROBNP SERPL-MCNC: 3731 PG/ML (ref 0–900)
PHOSPHATE SERPL-MCNC: 7 MG/DL (ref 2.5–4.5)
PLATELET # BLD AUTO: 275 10E3/UL (ref 150–450)
PLATELET # BLD AUTO: 294 10E3/UL (ref 150–450)
POTASSIUM SERPL-SCNC: 4.7 MMOL/L (ref 3.4–5.3)
PROT SERPL-MCNC: 6.5 G/DL (ref 6.4–8.3)
RBC # BLD AUTO: 3.49 10E6/UL (ref 3.8–5.2)
SODIUM SERPL-SCNC: 137 MMOL/L (ref 135–145)
T4 FREE SERPL-MCNC: 0.94 NG/DL (ref 0.9–1.7)
TSH SERPL DL<=0.005 MIU/L-ACNC: 5.5 UIU/ML (ref 0.3–4.2)
WBC # BLD AUTO: 7.9 10E3/UL (ref 4–11)

## 2024-12-11 PROCEDURE — 84100 ASSAY OF PHOSPHORUS: CPT | Performed by: INTERNAL MEDICINE

## 2024-12-11 PROCEDURE — 83605 ASSAY OF LACTIC ACID: CPT | Performed by: INTERNAL MEDICINE

## 2024-12-11 PROCEDURE — 82140 ASSAY OF AMMONIA: CPT | Performed by: INTERNAL MEDICINE

## 2024-12-11 PROCEDURE — 84443 ASSAY THYROID STIM HORMONE: CPT | Performed by: INTERNAL MEDICINE

## 2024-12-11 PROCEDURE — 250N000013 HC RX MED GY IP 250 OP 250 PS 637: Performed by: INTERNAL MEDICINE

## 2024-12-11 PROCEDURE — 36415 COLL VENOUS BLD VENIPUNCTURE: CPT | Performed by: INTERNAL MEDICINE

## 2024-12-11 PROCEDURE — 84155 ASSAY OF PROTEIN SERUM: CPT | Performed by: INTERNAL MEDICINE

## 2024-12-11 PROCEDURE — 85049 AUTOMATED PLATELET COUNT: CPT | Performed by: STUDENT IN AN ORGANIZED HEALTH CARE EDUCATION/TRAINING PROGRAM

## 2024-12-11 PROCEDURE — 36415 COLL VENOUS BLD VENIPUNCTURE: CPT | Performed by: STUDENT IN AN ORGANIZED HEALTH CARE EDUCATION/TRAINING PROGRAM

## 2024-12-11 PROCEDURE — 94640 AIRWAY INHALATION TREATMENT: CPT | Mod: 76

## 2024-12-11 PROCEDURE — 83735 ASSAY OF MAGNESIUM: CPT | Performed by: INTERNAL MEDICINE

## 2024-12-11 PROCEDURE — 210N000002 HC R&B HEART CARE

## 2024-12-11 PROCEDURE — 85027 COMPLETE CBC AUTOMATED: CPT | Performed by: INTERNAL MEDICINE

## 2024-12-11 PROCEDURE — 84439 ASSAY OF FREE THYROXINE: CPT | Performed by: INTERNAL MEDICINE

## 2024-12-11 PROCEDURE — 250N000013 HC RX MED GY IP 250 OP 250 PS 637: Performed by: STUDENT IN AN ORGANIZED HEALTH CARE EDUCATION/TRAINING PROGRAM

## 2024-12-11 PROCEDURE — 80048 BASIC METABOLIC PNL TOTAL CA: CPT | Performed by: INTERNAL MEDICINE

## 2024-12-11 PROCEDURE — 81003 URINALYSIS AUTO W/O SCOPE: CPT | Performed by: INTERNAL MEDICINE

## 2024-12-11 PROCEDURE — 82435 ASSAY OF BLOOD CHLORIDE: CPT | Performed by: INTERNAL MEDICINE

## 2024-12-11 PROCEDURE — 82040 ASSAY OF SERUM ALBUMIN: CPT | Performed by: INTERNAL MEDICINE

## 2024-12-11 PROCEDURE — 99233 SBSQ HOSP IP/OBS HIGH 50: CPT | Performed by: INTERNAL MEDICINE

## 2024-12-11 PROCEDURE — 999N000157 HC STATISTIC RCP TIME EA 10 MIN

## 2024-12-11 PROCEDURE — 250N000011 HC RX IP 250 OP 636: Performed by: INTERNAL MEDICINE

## 2024-12-11 PROCEDURE — 99232 SBSQ HOSP IP/OBS MODERATE 35: CPT | Performed by: INTERNAL MEDICINE

## 2024-12-11 PROCEDURE — 250N000011 HC RX IP 250 OP 636: Performed by: STUDENT IN AN ORGANIZED HEALTH CARE EDUCATION/TRAINING PROGRAM

## 2024-12-11 PROCEDURE — 94640 AIRWAY INHALATION TREATMENT: CPT

## 2024-12-11 PROCEDURE — 250N000009 HC RX 250: Performed by: STUDENT IN AN ORGANIZED HEALTH CARE EDUCATION/TRAINING PROGRAM

## 2024-12-11 PROCEDURE — 83880 ASSAY OF NATRIURETIC PEPTIDE: CPT | Performed by: INTERNAL MEDICINE

## 2024-12-11 RX ADMIN — HALOPERIDOL LACTATE 2 MG: 5 INJECTION, SOLUTION INTRAMUSCULAR at 15:57

## 2024-12-11 RX ADMIN — HALOPERIDOL LACTATE 2 MG: 5 INJECTION, SOLUTION INTRAMUSCULAR at 09:16

## 2024-12-11 RX ADMIN — ACETAMINOPHEN 650 MG: 325 TABLET, FILM COATED ORAL at 21:30

## 2024-12-11 RX ADMIN — QUETIAPINE 12.5 MG: 25 TABLET, FILM COATED ORAL at 14:44

## 2024-12-11 RX ADMIN — HEPARIN SODIUM 5000 UNITS: 5000 INJECTION, SOLUTION INTRAVENOUS; SUBCUTANEOUS at 09:19

## 2024-12-11 RX ADMIN — QUETIAPINE 12.5 MG: 25 TABLET, FILM COATED ORAL at 21:29

## 2024-12-11 RX ADMIN — INSULIN ASPART 1 UNITS: 100 INJECTION, SOLUTION INTRAVENOUS; SUBCUTANEOUS at 12:23

## 2024-12-11 RX ADMIN — Medication 1 MG: at 21:30

## 2024-12-11 RX ADMIN — IPRATROPIUM BROMIDE AND ALBUTEROL SULFATE 3 ML: .5; 3 SOLUTION RESPIRATORY (INHALATION) at 15:14

## 2024-12-11 RX ADMIN — CEFTRIAXONE SODIUM 2 G: 2 INJECTION, POWDER, FOR SOLUTION INTRAMUSCULAR; INTRAVENOUS at 16:04

## 2024-12-11 RX ADMIN — ACETAMINOPHEN 650 MG: 325 TABLET, FILM COATED ORAL at 09:24

## 2024-12-11 RX ADMIN — IPRATROPIUM BROMIDE AND ALBUTEROL SULFATE 3 ML: .5; 3 SOLUTION RESPIRATORY (INHALATION) at 07:47

## 2024-12-11 RX ADMIN — HEPARIN SODIUM 5000 UNITS: 5000 INJECTION, SOLUTION INTRAVENOUS; SUBCUTANEOUS at 01:09

## 2024-12-11 RX ADMIN — IPRATROPIUM BROMIDE AND ALBUTEROL SULFATE 3 ML: .5; 3 SOLUTION RESPIRATORY (INHALATION) at 11:31

## 2024-12-11 RX ADMIN — INSULIN GLARGINE 15 UNITS: 100 INJECTION, SOLUTION SUBCUTANEOUS at 21:34

## 2024-12-11 RX ADMIN — IPRATROPIUM BROMIDE AND ALBUTEROL SULFATE 3 ML: .5; 3 SOLUTION RESPIRATORY (INHALATION) at 19:23

## 2024-12-11 RX ADMIN — HALOPERIDOL LACTATE 2 MG: 5 INJECTION, SOLUTION INTRAMUSCULAR at 22:28

## 2024-12-11 RX ADMIN — POLYETHYLENE GLYCOL 3350 17 G: 17 POWDER, FOR SOLUTION ORAL at 09:25

## 2024-12-11 RX ADMIN — AMLODIPINE BESYLATE 10 MG: 10 TABLET ORAL at 09:24

## 2024-12-11 RX ADMIN — LOSARTAN POTASSIUM 50 MG: 50 TABLET, FILM COATED ORAL at 09:25

## 2024-12-11 RX ADMIN — AMLODIPINE BESYLATE 10 MG: 10 TABLET ORAL at 21:30

## 2024-12-11 RX ADMIN — HALOPERIDOL LACTATE 2 MG: 5 INJECTION, SOLUTION INTRAMUSCULAR at 01:09

## 2024-12-11 RX ADMIN — ALLOPURINOL 200 MG: 100 TABLET ORAL at 09:25

## 2024-12-11 RX ADMIN — SIMVASTATIN 20 MG: 20 TABLET, FILM COATED ORAL at 21:30

## 2024-12-11 RX ADMIN — HEPARIN SODIUM 5000 UNITS: 5000 INJECTION, SOLUTION INTRAVENOUS; SUBCUTANEOUS at 23:01

## 2024-12-11 RX ADMIN — METOPROLOL TARTRATE 50 MG: 50 TABLET, FILM COATED ORAL at 09:24

## 2024-12-11 RX ADMIN — INSULIN ASPART 1 UNITS: 100 INJECTION, SOLUTION INTRAVENOUS; SUBCUTANEOUS at 18:07

## 2024-12-11 RX ADMIN — METOPROLOL TARTRATE 50 MG: 50 TABLET, FILM COATED ORAL at 21:30

## 2024-12-11 RX ADMIN — SERTRALINE HYDROCHLORIDE 50 MG: 50 TABLET ORAL at 09:25

## 2024-12-11 RX ADMIN — HEPARIN SODIUM 5000 UNITS: 5000 INJECTION, SOLUTION INTRAVENOUS; SUBCUTANEOUS at 16:01

## 2024-12-11 RX ADMIN — CLOPIDOGREL BISULFATE 75 MG: 75 TABLET ORAL at 09:25

## 2024-12-11 ASSESSMENT — ACTIVITIES OF DAILY LIVING (ADL)
ADLS_ACUITY_SCORE: 83
ADLS_ACUITY_SCORE: 77
ADLS_ACUITY_SCORE: 83
ADLS_ACUITY_SCORE: 77
ADLS_ACUITY_SCORE: 83
ADLS_ACUITY_SCORE: 79
ADLS_ACUITY_SCORE: 77
ADLS_ACUITY_SCORE: 83
ADLS_ACUITY_SCORE: 83
ADLS_ACUITY_SCORE: 79
ADLS_ACUITY_SCORE: 77
ADLS_ACUITY_SCORE: 77
ADLS_ACUITY_SCORE: 83
ADLS_ACUITY_SCORE: 83
ADLS_ACUITY_SCORE: 79
ADLS_ACUITY_SCORE: 77
ADLS_ACUITY_SCORE: 77
ADLS_ACUITY_SCORE: 83

## 2024-12-11 NOTE — PLAN OF CARE
[id number]     Orientation: A&O x 1 to self, hallucinating, non redirectable. Restless and agitated. Calling out and yelling most of night. Melatonin, Seroquel, and haldol given without change.  Vitals: VSS on 5L NC, denied pain  Tele: SR first degree AVB  Lines/Drains: IV SL  Skin/Wounds: scattered bruising  GI/: regular diet. Purewick in place, bladder scanned for 358mL at 0650.  Labs: Abnormal/Trends - creat 1.56. Electrolyte replacement - K, Mg redraw in AM.  Ambulation/Assist: Q2 repo. Alarms on.  Plan: continue plan of care    Heart Failure Care Map  GOALS TO BE MET BEFORE DISCHARGE:    1. Decrease congestion and/or edema with diuretic therapy to achieve near optimal volume status.     Dyspnea improved: No, further care required to meet this goal. Please explain SOB with activity   Edema improved: Yes, satisfactory for discharge.        Last 24 hour I/O:   Intake/Output Summary (Last 24 hours) at 12/11/2024 0649  Last data filed at 12/11/2024 0600  Gross per 24 hour   Intake 280 ml   Output 200 ml   Net 80 ml           Net I/O and Weights since admission:   11/11 0700 - 12/11 0659  In: 1260 [P.O.:1160]  Out: 1000 [Urine:1000]  Net: 260     Vitals:    12/07/24 0948 12/07/24 2100 12/10/24 0500 12/11/24 0600   Weight: 89.8 kg (198 lb) 91.7 kg (202 lb 2.6 oz) 91.7 kg (202 lb 2.6 oz) 92 kg (202 lb 13.2 oz)       2.  O2 sats > 90% on room air, or at prior home O2 therapy level.      Able to wean O2 this shift to keep sats above 90%?: No, further care required to meet this goal. Please explain requiring 5-6L   Does patient use Home O2? No          Current oxygenation status:   SpO2: 96 %     O2 Device: Nasal cannula, Oxygen Delivery: 5 LPM    3.  Tolerates ambulation and mobility near baseline.     Ambulation: Yes, satisfactory for discharge.   Times patient ambulated with staff this shift: 0 - pt wheelchair bound at baseline    Please review the Heart Failure Care Map for additional HF goal outcomes.    Sandee GRANDE  MELANIE Garcia  12/11/2024

## 2024-12-11 NOTE — PROGRESS NOTES
"Cardiology Progress Note          Assessment and Plan:         74-year-old female with a past medical history significant for hypertension, CVA with residual left-sided deficits, diabetes and a history of heart failure with preserved ejection fraction who was admitted to [facility] on 2024 with acute onset dyspnea and hypertension.     Cardiac troponins were noted to be elevated although they have been relatively flat in trajectory.  An echocardiogram on 2024 demonstrated mildly reduced left ventricular systolic function with anteroseptal and distal inferior hypokinesis.  Stress perfusion imaging yesterday demonstrated an inferior infarct without any significant ischemia.    IMPRESSION:     Heart failure with mildly reduced ejection fraction.  Severe hypertension which has resolved with appropriate medical therapy.  History of CVA in the past with residual left-sided deficits.  Altered mental status secondary to presumed toxic encephalopathy.     PLAN     -Creatinine has increased further today, will decrease IV furosemide.  Would probably transition to oral furosemide tomorrow.  -Her echocardiographic and stress perfusion findings are suggestive of an inferior infarct without any significant ischemia.  In the absence of chest discomfort I would continue diuretic therapy and treat her medically for presumed coronary artery disease.  -Will sign off, please call with questions.         Interval History:     Awake but appears agitated and reporting headache and generalized body discomfort.             Review of Systems:   As per subjective, otherwise 5 systems reviewed and negative.           Physical Exam:   Blood pressure 115/65, pulse 56, temperature 97.9  F (36.6  C), temperature source Oral, resp. rate 18, height 1.651 m (5' 5\"), weight 92 kg (202 lb 13.2 oz), SpO2 96%.      Vital Sign Ranges  Temperature Temp  Av.2  F (36.8  C)  Min: 97.6  F (36.4  C)  Max: 98.8  F (37.1  C)   Blood " pressure Systolic (24hrs), Av , Min:91 , Max:170        Diastolic (24hrs), Av, Min:45, Max:117      Pulse Pulse  Av.7  Min: 63  Max: 111   Respirations Resp  Av  Min: 20  Max: 20   Pulse oximetry SpO2  Av.6 %  Min: 90 %  Max: 97 %         Intake/Output Summary (Last 24 hours) at 2024 0824  Last data filed at 2024 1728  Gross per 24 hour   Intake 160 ml   Output 250 ml   Net -90 ml       Constitutional: Somnolent  Skin: Warm and dry  Neck: No JVD  Lungs: CTA  Cardiovascular: RRR, no m/r/g  Abdomen: Soft, non tender.  Extremities and Back: No LE edema  Neurological: Nonfocal           Medications:     I have reviewed this patient's current medications.              Data:     Labs reviewed.           Nakia Mandujano MD, M.D.
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
## 2024-12-12 VITALS
WEIGHT: 202.38 LBS | TEMPERATURE: 98.4 F | OXYGEN SATURATION: 94 % | BODY MASS INDEX: 33.72 KG/M2 | DIASTOLIC BLOOD PRESSURE: 62 MMHG | HEIGHT: 65 IN | SYSTOLIC BLOOD PRESSURE: 114 MMHG | RESPIRATION RATE: 18 BRPM | HEART RATE: 56 BPM

## 2024-12-12 LAB
ALBUMIN UR-MCNC: 30 MG/DL
ANION GAP SERPL CALCULATED.3IONS-SCNC: 12 MMOL/L (ref 7–15)
APPEARANCE UR: CLEAR
BACTERIA #/AREA URNS HPF: ABNORMAL /HPF
BACTERIA BLD CULT: NO GROWTH
BILIRUB UR QL STRIP: NEGATIVE
BUN SERPL-MCNC: 57 MG/DL (ref 8–23)
CALCIUM SERPL-MCNC: 8.7 MG/DL (ref 8.8–10.4)
CHLORIDE SERPL-SCNC: 103 MMOL/L (ref 98–107)
COLOR UR AUTO: YELLOW
CREAT SERPL-MCNC: 1.22 MG/DL (ref 0.51–0.95)
CRP SERPL-MCNC: 15.36 MG/L
EGFRCR SERPLBLD CKD-EPI 2021: 46 ML/MIN/1.73M2
ERYTHROCYTE [DISTWIDTH] IN BLOOD BY AUTOMATED COUNT: 15.7 % (ref 10–15)
GLUCOSE BLDC GLUCOMTR-MCNC: 122 MG/DL (ref 70–99)
GLUCOSE BLDC GLUCOMTR-MCNC: 132 MG/DL (ref 70–99)
GLUCOSE BLDC GLUCOMTR-MCNC: 142 MG/DL (ref 70–99)
GLUCOSE BLDC GLUCOMTR-MCNC: 158 MG/DL (ref 70–99)
GLUCOSE SERPL-MCNC: 116 MG/DL (ref 70–99)
GLUCOSE UR STRIP-MCNC: NEGATIVE MG/DL
HCO3 SERPL-SCNC: 25 MMOL/L (ref 22–29)
HCT VFR BLD AUTO: 31.8 % (ref 35–47)
HGB BLD-MCNC: 10.2 G/DL (ref 11.7–15.7)
HGB UR QL STRIP: NEGATIVE
HYALINE CASTS: 3 /LPF
KETONES UR STRIP-MCNC: NEGATIVE MG/DL
LEUKOCYTE ESTERASE UR QL STRIP: ABNORMAL
MAGNESIUM SERPL-MCNC: 2.7 MG/DL (ref 1.7–2.3)
MCH RBC QN AUTO: 29 PG (ref 26.5–33)
MCHC RBC AUTO-ENTMCNC: 32.1 G/DL (ref 31.5–36.5)
MCV RBC AUTO: 90 FL (ref 78–100)
NITRATE UR QL: NEGATIVE
PH UR STRIP: 5 [PH] (ref 5–7)
PLATELET # BLD AUTO: 276 10E3/UL (ref 150–450)
POTASSIUM SERPL-SCNC: 4.2 MMOL/L (ref 3.4–5.3)
RBC # BLD AUTO: 3.52 10E6/UL (ref 3.8–5.2)
RBC URINE: 2 /HPF
SODIUM SERPL-SCNC: 140 MMOL/L (ref 135–145)
SP GR UR STRIP: 1.02 (ref 1–1.03)
SQUAMOUS EPITHELIAL: 6 /HPF
TRANSITIONAL EPI: <1 /HPF
UROBILINOGEN UR STRIP-MCNC: NORMAL MG/DL
VIT B12 SERPL-MCNC: 193 PG/ML (ref 232–1245)
WBC # BLD AUTO: 8.3 10E3/UL (ref 4–11)
WBC URINE: 7 /HPF

## 2024-12-12 PROCEDURE — 94640 AIRWAY INHALATION TREATMENT: CPT | Mod: 76

## 2024-12-12 PROCEDURE — 250N000011 HC RX IP 250 OP 636: Performed by: HOSPITALIST

## 2024-12-12 PROCEDURE — 84132 ASSAY OF SERUM POTASSIUM: CPT | Performed by: INTERNAL MEDICINE

## 2024-12-12 PROCEDURE — 250N000011 HC RX IP 250 OP 636: Performed by: INTERNAL MEDICINE

## 2024-12-12 PROCEDURE — 94640 AIRWAY INHALATION TREATMENT: CPT

## 2024-12-12 PROCEDURE — 82607 VITAMIN B-12: CPT | Performed by: PSYCHIATRY & NEUROLOGY

## 2024-12-12 PROCEDURE — 83735 ASSAY OF MAGNESIUM: CPT | Performed by: STUDENT IN AN ORGANIZED HEALTH CARE EDUCATION/TRAINING PROGRAM

## 2024-12-12 PROCEDURE — 250N000009 HC RX 250: Performed by: INTERNAL MEDICINE

## 2024-12-12 PROCEDURE — 250N000013 HC RX MED GY IP 250 OP 250 PS 637: Performed by: STUDENT IN AN ORGANIZED HEALTH CARE EDUCATION/TRAINING PROGRAM

## 2024-12-12 PROCEDURE — 99222 1ST HOSP IP/OBS MODERATE 55: CPT | Performed by: STUDENT IN AN ORGANIZED HEALTH CARE EDUCATION/TRAINING PROGRAM

## 2024-12-12 PROCEDURE — 99233 SBSQ HOSP IP/OBS HIGH 50: CPT | Performed by: INTERNAL MEDICINE

## 2024-12-12 PROCEDURE — 250N000009 HC RX 250: Performed by: STUDENT IN AN ORGANIZED HEALTH CARE EDUCATION/TRAINING PROGRAM

## 2024-12-12 PROCEDURE — 999N000157 HC STATISTIC RCP TIME EA 10 MIN

## 2024-12-12 PROCEDURE — 80048 BASIC METABOLIC PNL TOTAL CA: CPT | Performed by: INTERNAL MEDICINE

## 2024-12-12 PROCEDURE — 86140 C-REACTIVE PROTEIN: CPT | Performed by: INTERNAL MEDICINE

## 2024-12-12 PROCEDURE — 250N000013 HC RX MED GY IP 250 OP 250 PS 637: Performed by: INTERNAL MEDICINE

## 2024-12-12 PROCEDURE — 85018 HEMOGLOBIN: CPT | Performed by: INTERNAL MEDICINE

## 2024-12-12 PROCEDURE — 120N000001 HC R&B MED SURG/OB

## 2024-12-12 PROCEDURE — 250N000011 HC RX IP 250 OP 636: Performed by: STUDENT IN AN ORGANIZED HEALTH CARE EDUCATION/TRAINING PROGRAM

## 2024-12-12 PROCEDURE — 36415 COLL VENOUS BLD VENIPUNCTURE: CPT | Performed by: STUDENT IN AN ORGANIZED HEALTH CARE EDUCATION/TRAINING PROGRAM

## 2024-12-12 PROCEDURE — 250N000011 HC RX IP 250 OP 636: Mod: JW | Performed by: INTERNAL MEDICINE

## 2024-12-12 RX ORDER — AMOXICILLIN 250 MG
1 CAPSULE ORAL AT BEDTIME
Status: DISCONTINUED | OUTPATIENT
Start: 2024-12-12 | End: 2024-12-14

## 2024-12-12 RX ORDER — NICOTINE POLACRILEX 4 MG
15-30 LOZENGE BUCCAL
Status: DISCONTINUED | OUTPATIENT
Start: 2024-12-12 | End: 2024-12-26 | Stop reason: HOSPADM

## 2024-12-12 RX ORDER — OLANZAPINE 2.5 MG/1
2.5 TABLET, FILM COATED ORAL EVERY 6 HOURS PRN
Status: DISCONTINUED | OUTPATIENT
Start: 2024-12-12 | End: 2024-12-12

## 2024-12-12 RX ORDER — SERTRALINE HYDROCHLORIDE 100 MG/1
100 TABLET, FILM COATED ORAL DAILY
Status: DISCONTINUED | OUTPATIENT
Start: 2024-12-13 | End: 2024-12-26 | Stop reason: HOSPADM

## 2024-12-12 RX ORDER — ACETAMINOPHEN 500 MG
1000 TABLET ORAL 3 TIMES DAILY
Status: DISCONTINUED | OUTPATIENT
Start: 2024-12-12 | End: 2024-12-26 | Stop reason: HOSPADM

## 2024-12-12 RX ORDER — OLANZAPINE 10 MG/2ML
5 INJECTION, POWDER, FOR SOLUTION INTRAMUSCULAR ONCE
Status: COMPLETED | OUTPATIENT
Start: 2024-12-12 | End: 2024-12-12

## 2024-12-12 RX ORDER — OLANZAPINE 5 MG/1
5 TABLET ORAL EVERY 6 HOURS PRN
Status: DISCONTINUED | OUTPATIENT
Start: 2024-12-12 | End: 2024-12-15 | Stop reason: ALTCHOICE

## 2024-12-12 RX ORDER — LIDOCAINE 4 G/G
1 PATCH TOPICAL EVERY 24 HOURS
Status: DISCONTINUED | OUTPATIENT
Start: 2024-12-12 | End: 2024-12-26 | Stop reason: HOSPADM

## 2024-12-12 RX ORDER — HALOPERIDOL 5 MG/ML
2 INJECTION INTRAMUSCULAR ONCE
Status: COMPLETED | OUTPATIENT
Start: 2024-12-12 | End: 2024-12-12

## 2024-12-12 RX ORDER — AMOXICILLIN 250 MG
2 CAPSULE ORAL 2 TIMES DAILY
Status: DISCONTINUED | OUTPATIENT
Start: 2024-12-12 | End: 2024-12-12

## 2024-12-12 RX ORDER — AMOXICILLIN 250 MG
2 CAPSULE ORAL 2 TIMES DAILY
Status: DISCONTINUED | OUTPATIENT
Start: 2024-12-12 | End: 2024-12-26 | Stop reason: HOSPADM

## 2024-12-12 RX ORDER — DEXTROSE MONOHYDRATE 25 G/50ML
25-50 INJECTION, SOLUTION INTRAVENOUS
Status: DISCONTINUED | OUTPATIENT
Start: 2024-12-12 | End: 2024-12-26 | Stop reason: HOSPADM

## 2024-12-12 RX ADMIN — HEPARIN SODIUM 5000 UNITS: 5000 INJECTION, SOLUTION INTRAVENOUS; SUBCUTANEOUS at 23:44

## 2024-12-12 RX ADMIN — HALOPERIDOL LACTATE 2 MG: 5 INJECTION, SOLUTION INTRAMUSCULAR at 06:19

## 2024-12-12 RX ADMIN — ACETAMINOPHEN 1000 MG: 500 TABLET, FILM COATED ORAL at 21:51

## 2024-12-12 RX ADMIN — ACETAMINOPHEN 1000 MG: 500 TABLET, FILM COATED ORAL at 17:17

## 2024-12-12 RX ADMIN — CLOPIDOGREL BISULFATE 75 MG: 75 TABLET ORAL at 08:57

## 2024-12-12 RX ADMIN — SENNOSIDES AND DOCUSATE SODIUM 1 TABLET: 50; 8.6 TABLET ORAL at 21:52

## 2024-12-12 RX ADMIN — OLANZAPINE 5 MG: 10 INJECTION, POWDER, FOR SOLUTION INTRAMUSCULAR at 20:40

## 2024-12-12 RX ADMIN — SIMVASTATIN 20 MG: 20 TABLET, FILM COATED ORAL at 21:51

## 2024-12-12 RX ADMIN — AMLODIPINE BESYLATE 10 MG: 10 TABLET ORAL at 21:52

## 2024-12-12 RX ADMIN — LOSARTAN POTASSIUM 50 MG: 50 TABLET, FILM COATED ORAL at 08:58

## 2024-12-12 RX ADMIN — HEPARIN SODIUM 5000 UNITS: 5000 INJECTION, SOLUTION INTRAVENOUS; SUBCUTANEOUS at 09:01

## 2024-12-12 RX ADMIN — AMLODIPINE BESYLATE 10 MG: 10 TABLET ORAL at 08:58

## 2024-12-12 RX ADMIN — Medication 1 MG: at 21:52

## 2024-12-12 RX ADMIN — ALLOPURINOL 200 MG: 100 TABLET ORAL at 08:58

## 2024-12-12 RX ADMIN — ACETAMINOPHEN 650 MG: 325 TABLET, FILM COATED ORAL at 09:07

## 2024-12-12 RX ADMIN — INSULIN GLARGINE 15 UNITS: 100 INJECTION, SOLUTION SUBCUTANEOUS at 21:58

## 2024-12-12 RX ADMIN — IPRATROPIUM BROMIDE AND ALBUTEROL SULFATE 3 ML: .5; 3 SOLUTION RESPIRATORY (INHALATION) at 10:32

## 2024-12-12 RX ADMIN — HALOPERIDOL LACTATE 2 MG: 5 INJECTION, SOLUTION INTRAMUSCULAR at 02:14

## 2024-12-12 RX ADMIN — SERTRALINE HYDROCHLORIDE 50 MG: 50 TABLET ORAL at 08:58

## 2024-12-12 RX ADMIN — IPRATROPIUM BROMIDE AND ALBUTEROL SULFATE 3 ML: .5; 3 SOLUTION RESPIRATORY (INHALATION) at 14:24

## 2024-12-12 RX ADMIN — METOPROLOL TARTRATE 50 MG: 50 TABLET, FILM COATED ORAL at 08:58

## 2024-12-12 RX ADMIN — METOPROLOL TARTRATE 50 MG: 50 TABLET, FILM COATED ORAL at 21:51

## 2024-12-12 RX ADMIN — HEPARIN SODIUM 5000 UNITS: 5000 INJECTION, SOLUTION INTRAVENOUS; SUBCUTANEOUS at 15:22

## 2024-12-12 ASSESSMENT — ACTIVITIES OF DAILY LIVING (ADL)
ADLS_ACUITY_SCORE: 83
ADLS_ACUITY_SCORE: 81
ADLS_ACUITY_SCORE: 83
ADLS_ACUITY_SCORE: 83
ADLS_ACUITY_SCORE: 81
ADLS_ACUITY_SCORE: 83
ADLS_ACUITY_SCORE: 81
ADLS_ACUITY_SCORE: 81
ADLS_ACUITY_SCORE: 83
ADLS_ACUITY_SCORE: 83
ADLS_ACUITY_SCORE: 81
ADLS_ACUITY_SCORE: 83
ADLS_ACUITY_SCORE: 83

## 2024-12-12 NOTE — PLAN OF CARE
"1900-0730     Orientation: A&O to self. Agitated overnight. Haldol did not seem to calm her, extra OTD dose given. Pulled out IV, continually pulled off tele leads and oxygen. Hitting and verbally abusive to staff. Mitts placed on bilateral hands. Continually moaning throughout night, did not sleep.  Vitals: VSS on 2-3L NC, c/o of pain \"everywhere\", tylenol provided  Tele: SB/SR w/ 1st degree AVB  Lines/Drains: IV SL  Skin/Wounds: scattered bruising  GI/: regular diet. Incontinent, purewick in place  Labs: Abnormal/Trends - n/a. Electrolyte replacement - K and Mg have not resulted yet.  Ambulation/Assist: Q2 repo. Alarms on.  Plan: Head CT completed. UA collected. Continue plan of care, transfer to medical floor once bed available.      "

## 2024-12-12 NOTE — PLAN OF CARE
"Goal Outcome Evaluation:      Plan of Care Reviewed With: patient, sibling             Oriented to self. VSS on 2 L NC. Tele SB/SR, HR 55-65. Moaning, says pain in \"all over\", this afternoon did say stomach hurt. Last BM 12/11. consistently agitated, but not pulling at IV currently, Mits removed at 9am.  Pt will intermittently refuse meds/cares and is very inconsistent with reporting pain/symptoms. Up w/ lift. Turning q 2 hrs, heels elevated, foam on coccyx- pink.  Plan to get head MRI and EEG per Neuro. Sister Rosalia updated on plan of care and transfer.      "
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
## 2024-12-12 NOTE — PROVIDER NOTIFICATION
MD Notification    Notified Person: MD    Notified Person Name: Dr Wolff    Notification Date/Time:12/12/24 12:30pm    Notification Interaction: Vocera    Purpose of Notification: Pt is moaning, restless and saying she has pain all over. Now is saying she has stomach pain. BM yesterday documented, when asked if she needs to have BM now, she said no. Can we give her something for pain?    Orders Received:    Comments:
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
## 2024-12-12 NOTE — PROGRESS NOTES
([id number]) Oriented to self. VSS on 1.5L NC. Tele SB/SR, HR 55-65. Continually moans throughout the day and is consistently agitated despite tylenol, haldol, and seroquel. Pt will intermittently refuse meds/cares and is very inconsistent with reporting pain/symptoms. Up w/ lift. Very low urine output, only x1 wet brief this shift. Plan to get head CT and UA. Sister Rosalia updated on plan of care.

## 2024-12-12 NOTE — PROVIDER NOTIFICATION
MD Notification    Notified Person: MD    Notified Person Name: Dr. Ley    Notification Date/Time: 12/12/24 0150    Notification Interaction: Vocera    Purpose of Notification: Hello, pt admitted several days ago for increased AMS, SOB, CHF exacerbation. Baseline mental status in alert to self only, hallucinates. She currently is yelling out, moaning, becoming agitated and swatting and grabbing at staff. I've given her melatonin, Seroquel, and Haldol. Last dose was at 2230, cannot give another until 0430, although it doesn't seem to help all that much. Wondering if we could get something to calm her? She refuses to take anything orally at the moment. Thank you    Orders Received: extra one time dose of haldol ordered

## 2024-12-12 NOTE — SIGNIFICANT EVENT
Significant Event Note    Time of event: 2:48 AM December 12, 2024    Description of event:  Paged for severe agitation, having ripped out her IV lines and removed telemetry and O2.    Plan:  Extra Haldol and soft restraints have carefully been ordered for her safety and relief of symptoms.    Discussed with: bedside nurse    Severiano Ley MD

## 2024-12-13 LAB
ANION GAP SERPL CALCULATED.3IONS-SCNC: 12 MMOL/L (ref 7–15)
BUN SERPL-MCNC: 44.6 MG/DL (ref 8–23)
CALCIUM SERPL-MCNC: 9 MG/DL (ref 8.8–10.4)
CHLORIDE SERPL-SCNC: 107 MMOL/L (ref 98–107)
CREAT SERPL-MCNC: 0.72 MG/DL (ref 0.51–0.95)
EGFRCR SERPLBLD CKD-EPI 2021: 87 ML/MIN/1.73M2
GLUCOSE BLDC GLUCOMTR-MCNC: 106 MG/DL (ref 70–99)
GLUCOSE BLDC GLUCOMTR-MCNC: 107 MG/DL (ref 70–99)
GLUCOSE BLDC GLUCOMTR-MCNC: 166 MG/DL (ref 70–99)
GLUCOSE BLDC GLUCOMTR-MCNC: 169 MG/DL (ref 70–99)
GLUCOSE SERPL-MCNC: 108 MG/DL (ref 70–99)
HCO3 SERPL-SCNC: 26 MMOL/L (ref 22–29)
MAGNESIUM SERPL-MCNC: 2.9 MG/DL (ref 1.7–2.3)
PLATELET # BLD AUTO: 336 10E3/UL (ref 150–450)
POTASSIUM SERPL-SCNC: 4.6 MMOL/L (ref 3.4–5.3)
SODIUM SERPL-SCNC: 145 MMOL/L (ref 135–145)

## 2024-12-13 PROCEDURE — 250N000013 HC RX MED GY IP 250 OP 250 PS 637: Performed by: STUDENT IN AN ORGANIZED HEALTH CARE EDUCATION/TRAINING PROGRAM

## 2024-12-13 PROCEDURE — 82310 ASSAY OF CALCIUM: CPT | Performed by: INTERNAL MEDICINE

## 2024-12-13 PROCEDURE — 250N000013 HC RX MED GY IP 250 OP 250 PS 637: Performed by: INTERNAL MEDICINE

## 2024-12-13 PROCEDURE — 83735 ASSAY OF MAGNESIUM: CPT | Performed by: INTERNAL MEDICINE

## 2024-12-13 PROCEDURE — 120N000001 HC R&B MED SURG/OB

## 2024-12-13 PROCEDURE — 250N000011 HC RX IP 250 OP 636: Performed by: PSYCHIATRY & NEUROLOGY

## 2024-12-13 PROCEDURE — 99232 SBSQ HOSP IP/OBS MODERATE 35: CPT | Performed by: INTERNAL MEDICINE

## 2024-12-13 PROCEDURE — 94640 AIRWAY INHALATION TREATMENT: CPT | Mod: 76

## 2024-12-13 PROCEDURE — 36415 COLL VENOUS BLD VENIPUNCTURE: CPT | Performed by: INTERNAL MEDICINE

## 2024-12-13 PROCEDURE — 250N000009 HC RX 250: Performed by: INTERNAL MEDICINE

## 2024-12-13 PROCEDURE — 80048 BASIC METABOLIC PNL TOTAL CA: CPT | Performed by: INTERNAL MEDICINE

## 2024-12-13 PROCEDURE — 94640 AIRWAY INHALATION TREATMENT: CPT

## 2024-12-13 PROCEDURE — 250N000011 HC RX IP 250 OP 636: Performed by: INTERNAL MEDICINE

## 2024-12-13 PROCEDURE — 999N000128 HC STATISTIC PERIPHERAL IV START W/O US GUIDANCE

## 2024-12-13 PROCEDURE — 85049 AUTOMATED PLATELET COUNT: CPT | Performed by: INTERNAL MEDICINE

## 2024-12-13 PROCEDURE — 999N000157 HC STATISTIC RCP TIME EA 10 MIN

## 2024-12-13 RX ORDER — CYANOCOBALAMIN 1000 UG/ML
1000 INJECTION, SOLUTION INTRAMUSCULAR; SUBCUTANEOUS
Status: DISCONTINUED | OUTPATIENT
Start: 2024-12-13 | End: 2024-12-26 | Stop reason: HOSPADM

## 2024-12-13 RX ADMIN — LIDOCAINE 1 PATCH: 4 PATCH TOPICAL at 13:02

## 2024-12-13 RX ADMIN — INSULIN GLARGINE 15 UNITS: 100 INJECTION, SOLUTION SUBCUTANEOUS at 21:18

## 2024-12-13 RX ADMIN — ACETAMINOPHEN 1000 MG: 500 TABLET, FILM COATED ORAL at 17:33

## 2024-12-13 RX ADMIN — IPRATROPIUM BROMIDE AND ALBUTEROL SULFATE 3 ML: .5; 3 SOLUTION RESPIRATORY (INHALATION) at 19:01

## 2024-12-13 RX ADMIN — HEPARIN SODIUM 5000 UNITS: 5000 INJECTION, SOLUTION INTRAVENOUS; SUBCUTANEOUS at 17:34

## 2024-12-13 RX ADMIN — METOPROLOL TARTRATE 50 MG: 50 TABLET, FILM COATED ORAL at 21:15

## 2024-12-13 RX ADMIN — ACETAMINOPHEN 1000 MG: 500 TABLET, FILM COATED ORAL at 21:15

## 2024-12-13 RX ADMIN — SIMVASTATIN 20 MG: 20 TABLET, FILM COATED ORAL at 21:16

## 2024-12-13 RX ADMIN — IPRATROPIUM BROMIDE AND ALBUTEROL SULFATE 3 ML: .5; 3 SOLUTION RESPIRATORY (INHALATION) at 11:20

## 2024-12-13 RX ADMIN — AMLODIPINE BESYLATE 10 MG: 10 TABLET ORAL at 21:15

## 2024-12-13 RX ADMIN — SENNOSIDES AND DOCUSATE SODIUM 2 TABLET: 50; 8.6 TABLET ORAL at 17:33

## 2024-12-13 RX ADMIN — IPRATROPIUM BROMIDE AND ALBUTEROL SULFATE 3 ML: .5; 3 SOLUTION RESPIRATORY (INHALATION) at 15:00

## 2024-12-13 RX ADMIN — SENNOSIDES AND DOCUSATE SODIUM 1 TABLET: 50; 8.6 TABLET ORAL at 21:16

## 2024-12-13 RX ADMIN — CYANOCOBALAMIN 1000 MCG: 1000 INJECTION, SOLUTION INTRAMUSCULAR; SUBCUTANEOUS at 13:02

## 2024-12-13 RX ADMIN — IPRATROPIUM BROMIDE AND ALBUTEROL SULFATE 3 ML: .5; 3 SOLUTION RESPIRATORY (INHALATION) at 08:10

## 2024-12-13 ASSESSMENT — ACTIVITIES OF DAILY LIVING (ADL)
ADLS_ACUITY_SCORE: 87
ADLS_ACUITY_SCORE: 89
ADLS_ACUITY_SCORE: 87
ADLS_ACUITY_SCORE: 81
ADLS_ACUITY_SCORE: 87

## 2024-12-13 NOTE — PLAN OF CARE
"Patient Name: Jessika Garcia  MRN: [id number]  Date of Admission: 12/7/2024  Reason for Admission: SOB and AMS    /62 (BP Location: Right arm)   Pulse 56   Temp 98.4  F (36.9  C) (Axillary)   Resp 18   Ht 1.651 m (5' 5\")   Wt 91.8 kg (202 lb 6.1 oz)   SpO2 94%   BMI 33.68 kg/m       Pain: Unable to rate pain. Pain managed with scheduled tylenol    Resp: On 2 L oxygen. LS clear/diminished.  Telemetry: n/a  Neuro: Alert and oriented to self. Frequently calling out.  GI/: Incontinent - purewick in place. No BM this shift  Skin/Wounds: Reddened coccyx. Mepilex in place.  Lines/Drains: R PIV SL  Activity: Assist of 2 with lift.  Sleep: Awake most part of the night. Melatonin given at bedtime.   Abnormal Labs: Pending morning labs    Aggression Stop Light: Yellow          Patient Care Plan: MRI completed in the evening. Patient keep pulling IV out. Provider notified and ordered mitt. Continue with plan of care.   "

## 2024-12-13 NOTE — PLAN OF CARE
Goal Outcome Evaluation:         12/12 0700-1930    Orientation: Alert to self, calls out frequently, unable to state needs  Aggression Stop Light: Yellow  Activity: A2 w/lift, refuses repositioning   Diet/BS Checks: Regular  Tele: N/A  IV Access/Drains: Pt pulled IV downstairs, MD states pt needs access at this time, VAC placed for new IV  Pain Management: Calling out/moaning in pain, states 'its all over', gave scheduled Tylenol  Abnormal VS/Results: VSS on 2LO2;   -Creat 1.22  -CRP 15.36  Bowel/Bladder: Incontinent of B/B, purewick in place, low urine output  Skin/Wounds: Reddened coccyx  Consults: Neurology, Cardiology  D/C Disposition: TBD  Other Info:     -Pt transferred from heart center this shift  -Pt consistently moans, yells out, stating 'get me out of here', unable to tell me what she needs or how I can help her  -One time IM Zyprexa ordered for MRI

## 2024-12-13 NOTE — PROGRESS NOTES
Hospitalist service cross-cover note:     Ordered bilateral mitts due to patient repeatedly pulling out IV.     Jules Fair MD   Hospitalist
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
## 2024-12-13 NOTE — PROGRESS NOTES
"Neurology Progress Note.    Assessment and Plan:  This is a 74 year old woman with Hx of HTN, stroke with residual left-sided deficits, DM, CHF admitted for hypertensive urgency, JENNY, possible PNA, cardio-respiratory failure who presents with persistent encephalopathy.   Could be new strokes, seizures, or underlying vascular dementia given prior reports of confusion with infections   MRI brain didn't show concerning changes.   Patient didn't tolerate EEG  Her vitamin B12 came back very low - will order injectable   -recommend consider zyprexa 5 mg for agitation      Thank you for allowing me to participate in cares of this patient. Please contact me with any questions of concerns (pager [phone]).     Subjective: No acute events overnight.  Today patient reported that doing ok.     Objective:  /72 (BP Location: Right arm)   Pulse 67   Temp 98.4  F (36.9  C) (Axillary)   Resp 18   Ht 1.651 m (5' 5\")   Wt 91.8 kg (202 lb 6.1 oz)   SpO2 92%   BMI 33.68 kg/m    General: no acute distress  Neuro:  Alert, sitting in a chair. Say \"yes' to everything. The rest of the exam unreliable and/or unchanged from yesterday.     I have personally reviewed all the labs and imaging studies. Pertinent findings:   Component      Latest Ref Rng 12/12/2024  7:00 AM   Vitamin B12      232 - 1,245 pg/mL 193 (L)       Legend:  (L) Low  MRI brain:  IMPRESSION:  1.  No acute intracranial process.  2.  Generalized brain atrophy and presumed microvascular ischemic changes as detailed above.      Total time of visit: 35 minutes with the time spent on chart review, imaging and lab results review, and more than 50 % of the time spent on coordination of cares and face-to-face time with the patient including counseling regarding pathophysiology of the above conditions, results of the tests and further directions of care.     Karen Olguin MD  [facility]    "

## 2024-12-13 NOTE — PROGRESS NOTES
Care Management Follow Up    Length of Stay (days): 6    Expected Discharge Date: 12/16/2024     Concerns to be Addressed: discharge planning  Return to [facility]  Patient plan of care discussed at interdisciplinary rounds: Yes    Anticipated Discharge Disposition: Transitional Care  Disposition Comments: Return to Long Term care           Anticipated Discharge Services: None  Anticipated Discharge DME: None    Patient/family educated on Medicare website which has current facility and service quality ratings: no  Education Provided on the Discharge Plan: No  Patient/Family in Agreement with the Plan: yes    Referrals Placed by CM/SW: Post Acute Facilities  Private pay costs discussed: Not applicable    Discussed  Partnership in Safe Discharge Planning  document with patient/family: No     Handoff Completed: No, handoff not indicated or clinically appropriate    Additional Information:  Chart Reviewed. Care Management is following for discharge planning. Patient is from [facility]. Will return when medically stable.    Next Steps: SW will continue to follow    MEET Flores

## 2024-12-14 LAB
GLUCOSE BLDC GLUCOMTR-MCNC: 133 MG/DL (ref 70–99)
GLUCOSE BLDC GLUCOMTR-MCNC: 139 MG/DL (ref 70–99)
GLUCOSE BLDC GLUCOMTR-MCNC: 140 MG/DL (ref 70–99)
GLUCOSE BLDC GLUCOMTR-MCNC: 148 MG/DL (ref 70–99)
GLUCOSE BLDC GLUCOMTR-MCNC: 230 MG/DL (ref 70–99)
MAGNESIUM SERPL-MCNC: 2.8 MG/DL (ref 1.7–2.3)
PLATELET # BLD AUTO: 296 10E3/UL (ref 150–450)
POTASSIUM SERPL-SCNC: 4.9 MMOL/L (ref 3.4–5.3)
PROCALCITONIN SERPL IA-MCNC: 0.23 NG/ML

## 2024-12-14 PROCEDURE — 250N000013 HC RX MED GY IP 250 OP 250 PS 637: Performed by: INTERNAL MEDICINE

## 2024-12-14 PROCEDURE — 250N000011 HC RX IP 250 OP 636: Performed by: INTERNAL MEDICINE

## 2024-12-14 PROCEDURE — 94640 AIRWAY INHALATION TREATMENT: CPT | Mod: 76

## 2024-12-14 PROCEDURE — 99232 SBSQ HOSP IP/OBS MODERATE 35: CPT | Performed by: INTERNAL MEDICINE

## 2024-12-14 PROCEDURE — 250N000013 HC RX MED GY IP 250 OP 250 PS 637: Performed by: STUDENT IN AN ORGANIZED HEALTH CARE EDUCATION/TRAINING PROGRAM

## 2024-12-14 PROCEDURE — 83735 ASSAY OF MAGNESIUM: CPT | Performed by: INTERNAL MEDICINE

## 2024-12-14 PROCEDURE — 94640 AIRWAY INHALATION TREATMENT: CPT

## 2024-12-14 PROCEDURE — 36415 COLL VENOUS BLD VENIPUNCTURE: CPT | Performed by: STUDENT IN AN ORGANIZED HEALTH CARE EDUCATION/TRAINING PROGRAM

## 2024-12-14 PROCEDURE — 120N000001 HC R&B MED SURG/OB

## 2024-12-14 PROCEDURE — 84145 PROCALCITONIN (PCT): CPT | Performed by: INTERNAL MEDICINE

## 2024-12-14 PROCEDURE — 92610 EVALUATE SWALLOWING FUNCTION: CPT | Mod: GN | Performed by: SPEECH-LANGUAGE PATHOLOGIST

## 2024-12-14 PROCEDURE — 85049 AUTOMATED PLATELET COUNT: CPT | Performed by: STUDENT IN AN ORGANIZED HEALTH CARE EDUCATION/TRAINING PROGRAM

## 2024-12-14 PROCEDURE — 84132 ASSAY OF SERUM POTASSIUM: CPT | Performed by: PSYCHIATRY & NEUROLOGY

## 2024-12-14 PROCEDURE — 999N000147 HC STATISTIC PT IP EVAL DEFER: Performed by: PHYSICAL THERAPIST

## 2024-12-14 PROCEDURE — 999N000157 HC STATISTIC RCP TIME EA 10 MIN

## 2024-12-14 PROCEDURE — 250N000009 HC RX 250: Performed by: INTERNAL MEDICINE

## 2024-12-14 RX ORDER — DEXTROSE MONOHYDRATE 25 G/50ML
25-50 INJECTION, SOLUTION INTRAVENOUS
Status: DISCONTINUED | OUTPATIENT
Start: 2024-12-14 | End: 2024-12-14

## 2024-12-14 RX ORDER — FUROSEMIDE 10 MG/ML
40 INJECTION INTRAMUSCULAR; INTRAVENOUS DAILY
Status: DISCONTINUED | OUTPATIENT
Start: 2024-12-14 | End: 2024-12-17

## 2024-12-14 RX ORDER — NICOTINE POLACRILEX 4 MG
15-30 LOZENGE BUCCAL
Status: DISCONTINUED | OUTPATIENT
Start: 2024-12-14 | End: 2024-12-14

## 2024-12-14 RX ADMIN — SERTRALINE HYDROCHLORIDE 100 MG: 100 TABLET ORAL at 10:01

## 2024-12-14 RX ADMIN — ALLOPURINOL 200 MG: 100 TABLET ORAL at 10:01

## 2024-12-14 RX ADMIN — ACETAMINOPHEN 1000 MG: 500 TABLET, FILM COATED ORAL at 10:01

## 2024-12-14 RX ADMIN — IPRATROPIUM BROMIDE AND ALBUTEROL SULFATE 3 ML: .5; 3 SOLUTION RESPIRATORY (INHALATION) at 15:37

## 2024-12-14 RX ADMIN — AMLODIPINE BESYLATE 10 MG: 10 TABLET ORAL at 10:01

## 2024-12-14 RX ADMIN — LOSARTAN POTASSIUM 50 MG: 50 TABLET, FILM COATED ORAL at 10:01

## 2024-12-14 RX ADMIN — SENNOSIDES AND DOCUSATE SODIUM 2 TABLET: 50; 8.6 TABLET ORAL at 17:18

## 2024-12-14 RX ADMIN — ACETAMINOPHEN 1000 MG: 500 TABLET, FILM COATED ORAL at 17:18

## 2024-12-14 RX ADMIN — SENNOSIDES AND DOCUSATE SODIUM 2 TABLET: 50; 8.6 TABLET ORAL at 10:01

## 2024-12-14 RX ADMIN — METOPROLOL TARTRATE 50 MG: 50 TABLET, FILM COATED ORAL at 10:01

## 2024-12-14 RX ADMIN — HEPARIN SODIUM 5000 UNITS: 5000 INJECTION, SOLUTION INTRAVENOUS; SUBCUTANEOUS at 17:18

## 2024-12-14 RX ADMIN — FUROSEMIDE 40 MG: 10 INJECTION, SOLUTION INTRAMUSCULAR; INTRAVENOUS at 17:33

## 2024-12-14 RX ADMIN — IPRATROPIUM BROMIDE AND ALBUTEROL SULFATE 3 ML: .5; 3 SOLUTION RESPIRATORY (INHALATION) at 11:13

## 2024-12-14 RX ADMIN — INSULIN GLARGINE 15 UNITS: 100 INJECTION, SOLUTION SUBCUTANEOUS at 21:20

## 2024-12-14 RX ADMIN — LIDOCAINE 1 PATCH: 4 PATCH TOPICAL at 10:01

## 2024-12-14 RX ADMIN — HEPARIN SODIUM 5000 UNITS: 5000 INJECTION, SOLUTION INTRAVENOUS; SUBCUTANEOUS at 00:42

## 2024-12-14 RX ADMIN — IPRATROPIUM BROMIDE AND ALBUTEROL SULFATE 3 ML: .5; 3 SOLUTION RESPIRATORY (INHALATION) at 07:46

## 2024-12-14 RX ADMIN — CLOPIDOGREL BISULFATE 75 MG: 75 TABLET ORAL at 10:01

## 2024-12-14 RX ADMIN — HEPARIN SODIUM 5000 UNITS: 5000 INJECTION, SOLUTION INTRAVENOUS; SUBCUTANEOUS at 09:58

## 2024-12-14 RX ADMIN — POLYETHYLENE GLYCOL 3350 17 G: 17 POWDER, FOR SOLUTION ORAL at 09:58

## 2024-12-14 RX ADMIN — IPRATROPIUM BROMIDE AND ALBUTEROL SULFATE 3 ML: .5; 3 SOLUTION RESPIRATORY (INHALATION) at 21:13

## 2024-12-14 ASSESSMENT — ACTIVITIES OF DAILY LIVING (ADL)
ADLS_ACUITY_SCORE: 89
ADLS_ACUITY_SCORE: 86
ADLS_ACUITY_SCORE: 89
ADLS_ACUITY_SCORE: 86
ADLS_ACUITY_SCORE: 86
ADLS_ACUITY_SCORE: 89
ADLS_ACUITY_SCORE: 90
ADLS_ACUITY_SCORE: 86
ADLS_ACUITY_SCORE: 82
ADLS_ACUITY_SCORE: 92
ADLS_ACUITY_SCORE: 89
ADLS_ACUITY_SCORE: 89
ADLS_ACUITY_SCORE: 86
ADLS_ACUITY_SCORE: 90
ADLS_ACUITY_SCORE: 90
ADLS_ACUITY_SCORE: 89
ADLS_ACUITY_SCORE: 89
ADLS_ACUITY_SCORE: 90
ADLS_ACUITY_SCORE: 89

## 2024-12-14 NOTE — PLAN OF CARE
Orientation: Alert to self. Moaning and calling out, irritable with cares  Aggression Stop Light: Green/yellow, combative with cares  Activity:A2 lift, T/R  Diet/BS Checks: Reg with room service, poor intake. BG checks ACHS    Tele: N/A  IV Access/Drains: R PIV SL  Pain Management: Reports pain to head & back, refuses intervention. Lidocaine patch to lower back   Abnormal VS/Results: 1L O2  K, mg replacement protocol. AM rechecks ordered   Bowel/Bladder: Incont b/b. PW in place   Skin/Wounds: Healed PI/blister to sacrum. Mepi in place   Consults: Neuro, pulm and cardiology s/o  D/C Disposition: TBD    Other Info:    -Refused all morning meds   -Took evening pills whole with water

## 2024-12-14 NOTE — PLAN OF CARE
Physical Therapy: Received order, reviewed chart and talked to RN. Pt lives in LTC and has history of L sided weakness from CVA, up with lavonne lift and assist 1 at LTC, has assist with all IADLs and ADLs per SWS note. Pt has sitter as she has been combative and pulling at lines at times. Pt not able to follow commands this morning, moaning when asked questions although not combative. As pt is w/c bound and uses lift, not following commands, plan is back to LTC at discharge, no need for inpatient PT. Will sign off.

## 2024-12-14 NOTE — PLAN OF CARE
Goal Outcome Evaluation:  Orientation: Alert to self, moaning and calling out, was cooperative with cares      Aggression Stop Light: Green/yellow, can be combative with cares. Sitter at bedside    Vitals/Tele: VSS on 1 L NC    Pain: Denies    IV Access/drains: R PIV SL    Diet: Reg, BG checks    Mobility: Ax2 w lift, T&R    GI/: Incont b/b. PW in place     Wound/Skin: Healed PI/blister to sacrum. Mepi in place. Scattered bruising.    Consults: Neuro, pulm and cardiology s/o     Discharge Plan: TBD but will return to [facility]       See Flow sheets for assessment
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
## 2024-12-14 NOTE — PLAN OF CARE
SLP: ST orders received after reported aspiration event this morning. Pt alert with RN and nursing assistant providing excellent cares and redirection. Pt removing nasal cannula/yelling out/swinging/scratching/threw phone/crackers, though did stop when directed in a firm voice.     Given max encouragement and cues, pt did accept thin water via straw and self fed two gulps with no overt s/sx of aspiration. Cup removed from pt's  with additional attempts to throw. Despite all attempts, pt refused any foods and escalated to needing mitts with nursing staff.      Reviewed with pt/nursing. No hx of dysphagia or recurrent aspiration pneumonia. Episode this morning may have been a one-off, though pt certainly is at a high risk of aspiration d/t cognition. Pt did tolerate thin water at this time.     Will page MD to consider resuming regular diet vs clear liquids for a day to be cautious. Though throwing behaviors may make it difficult, would encourage pt to feed herself to reduce risk of aspiration and order finger foods (toast, dry cereal, banana, chicken strips, fries, cookie etc) to allow pt to be more independent with feeding without complex use of utensils. Will continue to follow and further assess when pt allows.     Addendum: regular diet resumed

## 2024-12-15 ENCOUNTER — HOSPITAL ENCOUNTER (INPATIENT)
Dept: NEUROLOGY | Facility: CLINIC | Age: 74
Discharge: HOME | End: 2024-12-15
Attending: PSYCHIATRY & NEUROLOGY
Payer: COMMERCIAL

## 2024-12-15 LAB
ANION GAP SERPL CALCULATED.3IONS-SCNC: 9 MMOL/L (ref 7–15)
BUN SERPL-MCNC: 29.5 MG/DL (ref 8–23)
CALCIUM SERPL-MCNC: 8.8 MG/DL (ref 8.8–10.4)
CHLORIDE SERPL-SCNC: 103 MMOL/L (ref 98–107)
CREAT SERPL-MCNC: 0.57 MG/DL (ref 0.51–0.95)
EGFRCR SERPLBLD CKD-EPI 2021: >90 ML/MIN/1.73M2
ERYTHROCYTE [DISTWIDTH] IN BLOOD BY AUTOMATED COUNT: 15.7 % (ref 10–15)
GLUCOSE BLDC GLUCOMTR-MCNC: 111 MG/DL (ref 70–99)
GLUCOSE BLDC GLUCOMTR-MCNC: 180 MG/DL (ref 70–99)
GLUCOSE BLDC GLUCOMTR-MCNC: 185 MG/DL (ref 70–99)
GLUCOSE BLDC GLUCOMTR-MCNC: 212 MG/DL (ref 70–99)
GLUCOSE SERPL-MCNC: 201 MG/DL (ref 70–99)
HCO3 SERPL-SCNC: 29 MMOL/L (ref 22–29)
HCT VFR BLD AUTO: 32.1 % (ref 35–47)
HGB BLD-MCNC: 9.8 G/DL (ref 11.7–15.7)
HOLD SPECIMEN: NORMAL
MAGNESIUM SERPL-MCNC: 2.2 MG/DL (ref 1.7–2.3)
MCH RBC QN AUTO: 29.4 PG (ref 26.5–33)
MCHC RBC AUTO-ENTMCNC: 30.5 G/DL (ref 31.5–36.5)
MCV RBC AUTO: 96 FL (ref 78–100)
PLATELET # BLD AUTO: 288 10E3/UL (ref 150–450)
POTASSIUM SERPL-SCNC: 4.3 MMOL/L (ref 3.4–5.3)
RBC # BLD AUTO: 3.33 10E6/UL (ref 3.8–5.2)
SODIUM SERPL-SCNC: 141 MMOL/L (ref 135–145)
WBC # BLD AUTO: 7.1 10E3/UL (ref 4–11)

## 2024-12-15 PROCEDURE — 250N000013 HC RX MED GY IP 250 OP 250 PS 637: Performed by: INTERNAL MEDICINE

## 2024-12-15 PROCEDURE — 80048 BASIC METABOLIC PNL TOTAL CA: CPT | Performed by: INTERNAL MEDICINE

## 2024-12-15 PROCEDURE — 999N000157 HC STATISTIC RCP TIME EA 10 MIN

## 2024-12-15 PROCEDURE — 120N000001 HC R&B MED SURG/OB

## 2024-12-15 PROCEDURE — 250N000009 HC RX 250: Performed by: INTERNAL MEDICINE

## 2024-12-15 PROCEDURE — 95816 EEG AWAKE AND DROWSY: CPT

## 2024-12-15 PROCEDURE — 94640 AIRWAY INHALATION TREATMENT: CPT | Mod: 76

## 2024-12-15 PROCEDURE — 85027 COMPLETE CBC AUTOMATED: CPT | Performed by: INTERNAL MEDICINE

## 2024-12-15 PROCEDURE — 83735 ASSAY OF MAGNESIUM: CPT | Performed by: INTERNAL MEDICINE

## 2024-12-15 PROCEDURE — 250N000011 HC RX IP 250 OP 636: Performed by: INTERNAL MEDICINE

## 2024-12-15 PROCEDURE — 99233 SBSQ HOSP IP/OBS HIGH 50: CPT | Performed by: INTERNAL MEDICINE

## 2024-12-15 PROCEDURE — 36415 COLL VENOUS BLD VENIPUNCTURE: CPT | Performed by: INTERNAL MEDICINE

## 2024-12-15 PROCEDURE — 250N000013 HC RX MED GY IP 250 OP 250 PS 637: Performed by: STUDENT IN AN ORGANIZED HEALTH CARE EDUCATION/TRAINING PROGRAM

## 2024-12-15 PROCEDURE — 999N000052 EEG AWAKE OR DROWSY ROUTINE

## 2024-12-15 PROCEDURE — 94640 AIRWAY INHALATION TREATMENT: CPT

## 2024-12-15 RX ORDER — OLANZAPINE 5 MG/1
5 TABLET, ORALLY DISINTEGRATING ORAL EVERY 6 HOURS PRN
Status: DISCONTINUED | OUTPATIENT
Start: 2024-12-15 | End: 2024-12-20

## 2024-12-15 RX ADMIN — ACETAMINOPHEN 1000 MG: 500 TABLET, FILM COATED ORAL at 09:26

## 2024-12-15 RX ADMIN — ALLOPURINOL 200 MG: 100 TABLET ORAL at 09:26

## 2024-12-15 RX ADMIN — POLYETHYLENE GLYCOL 3350 17 G: 17 POWDER, FOR SOLUTION ORAL at 08:28

## 2024-12-15 RX ADMIN — IPRATROPIUM BROMIDE AND ALBUTEROL SULFATE 3 ML: .5; 3 SOLUTION RESPIRATORY (INHALATION) at 18:16

## 2024-12-15 RX ADMIN — ACETAMINOPHEN 1000 MG: 500 TABLET, FILM COATED ORAL at 23:34

## 2024-12-15 RX ADMIN — HEPARIN SODIUM 5000 UNITS: 5000 INJECTION, SOLUTION INTRAVENOUS; SUBCUTANEOUS at 08:28

## 2024-12-15 RX ADMIN — HEPARIN SODIUM 5000 UNITS: 5000 INJECTION, SOLUTION INTRAVENOUS; SUBCUTANEOUS at 18:25

## 2024-12-15 RX ADMIN — IPRATROPIUM BROMIDE AND ALBUTEROL SULFATE 3 ML: .5; 3 SOLUTION RESPIRATORY (INHALATION) at 11:39

## 2024-12-15 RX ADMIN — SERTRALINE HYDROCHLORIDE 100 MG: 100 TABLET ORAL at 09:26

## 2024-12-15 RX ADMIN — OLANZAPINE 5 MG: 5 TABLET, ORALLY DISINTEGRATING ORAL at 14:28

## 2024-12-15 RX ADMIN — CLOPIDOGREL BISULFATE 75 MG: 75 TABLET ORAL at 09:26

## 2024-12-15 RX ADMIN — SENNOSIDES AND DOCUSATE SODIUM 2 TABLET: 50; 8.6 TABLET ORAL at 09:27

## 2024-12-15 RX ADMIN — IPRATROPIUM BROMIDE AND ALBUTEROL SULFATE 3 ML: .5; 3 SOLUTION RESPIRATORY (INHALATION) at 07:58

## 2024-12-15 RX ADMIN — AMLODIPINE BESYLATE 10 MG: 10 TABLET ORAL at 09:26

## 2024-12-15 RX ADMIN — IPRATROPIUM BROMIDE AND ALBUTEROL SULFATE 3 ML: .5; 3 SOLUTION RESPIRATORY (INHALATION) at 15:24

## 2024-12-15 RX ADMIN — METOPROLOL TARTRATE 50 MG: 50 TABLET, FILM COATED ORAL at 20:39

## 2024-12-15 RX ADMIN — HEPARIN SODIUM 5000 UNITS: 5000 INJECTION, SOLUTION INTRAVENOUS; SUBCUTANEOUS at 00:57

## 2024-12-15 RX ADMIN — FUROSEMIDE 40 MG: 10 INJECTION, SOLUTION INTRAMUSCULAR; INTRAVENOUS at 08:28

## 2024-12-15 RX ADMIN — LOSARTAN POTASSIUM 50 MG: 50 TABLET, FILM COATED ORAL at 09:26

## 2024-12-15 RX ADMIN — INSULIN GLARGINE 15 UNITS: 100 INJECTION, SOLUTION SUBCUTANEOUS at 23:42

## 2024-12-15 RX ADMIN — SIMVASTATIN 20 MG: 20 TABLET, FILM COATED ORAL at 23:34

## 2024-12-15 RX ADMIN — HEPARIN SODIUM 5000 UNITS: 5000 INJECTION, SOLUTION INTRAVENOUS; SUBCUTANEOUS at 23:34

## 2024-12-15 RX ADMIN — METOPROLOL TARTRATE 50 MG: 50 TABLET, FILM COATED ORAL at 09:27

## 2024-12-15 RX ADMIN — OLANZAPINE 5 MG: 5 TABLET, ORALLY DISINTEGRATING ORAL at 20:39

## 2024-12-15 ASSESSMENT — ACTIVITIES OF DAILY LIVING (ADL)
ADLS_ACUITY_SCORE: 92
ADLS_ACUITY_SCORE: 90
ADLS_ACUITY_SCORE: 92
ADLS_ACUITY_SCORE: 90
ADLS_ACUITY_SCORE: 90
ADLS_ACUITY_SCORE: 92
ADLS_ACUITY_SCORE: 90
ADLS_ACUITY_SCORE: 90
ADLS_ACUITY_SCORE: 92
ADLS_ACUITY_SCORE: 90
ADLS_ACUITY_SCORE: 92
ADLS_ACUITY_SCORE: 92

## 2024-12-15 NOTE — PLAN OF CARE
Goal Outcome Evaluation:  Orientation: Alert to self. Moaning and calling out, irritable with cares  Aggression Stop Light: Green/yellow, combative with cares. Sitter at bedside  Activity: A2 lift, T/R  Diet/BS Checks: Reg. BG checks     Tele: N/A  IV Access/Drains: R PIV SL  Pain Management: Denies  Abnormal VS/Results: 2-3L O2  K, mg replacement protocol.  Bowel/Bladder: Incont b/b. PW in place   Skin/Wounds: Healed PI/blister to sacrum. Mepi in place   Consults: Neuro, SLP, SW. Pulm, cardiology, PT s/o  D/C Disposition: TBD     Refused evening meds

## 2024-12-15 NOTE — PLAN OF CARE
"Orientation: Alert to self. Moaning and calling out, irritable with cares  Aggression Stop Light: Green/yellow, combative with cares  Activity: A2 lift, T/R  Diet/BS Checks: Reg. BG checks ACHS   Tele: N/A  IV Access/Drains: R PIV SL  Pain Management: Reports pain \"all over\". Denies intervention  Lidocaine patch to LLE   Abnormal VS/Results: 2-3L O2. HTN  K, mg replacement protocol. AM rechecks ordered   Bowel/Bladder: Incont b/b. PW in place   Skin/Wounds: Healed PI/blister to sacrum. Mepi in place   Consults: Neuro, SLP, SW. Pulm, cardiology, PT s/o  D/C Disposition: TBD     Other Info:    -Concern for aspiration this afternoon: Resolved  -Chest xray and SLP consult completed    "
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
## 2024-12-16 LAB
GLUCOSE BLDC GLUCOMTR-MCNC: 122 MG/DL (ref 70–99)
GLUCOSE BLDC GLUCOMTR-MCNC: 128 MG/DL (ref 70–99)
GLUCOSE BLDC GLUCOMTR-MCNC: 158 MG/DL (ref 70–99)
GLUCOSE BLDC GLUCOMTR-MCNC: 174 MG/DL (ref 70–99)
GLUCOSE BLDC GLUCOMTR-MCNC: 248 MG/DL (ref 70–99)
GLUCOSE BLDC GLUCOMTR-MCNC: 262 MG/DL (ref 70–99)
MAGNESIUM SERPL-MCNC: 2.3 MG/DL (ref 1.7–2.3)
PLATELET # BLD AUTO: 283 10E3/UL (ref 150–450)
POTASSIUM SERPL-SCNC: 4.9 MMOL/L (ref 3.4–5.3)

## 2024-12-16 PROCEDURE — 250N000012 HC RX MED GY IP 250 OP 636 PS 637: Performed by: INTERNAL MEDICINE

## 2024-12-16 PROCEDURE — 99233 SBSQ HOSP IP/OBS HIGH 50: CPT | Performed by: INTERNAL MEDICINE

## 2024-12-16 PROCEDURE — 250N000013 HC RX MED GY IP 250 OP 250 PS 637: Performed by: INTERNAL MEDICINE

## 2024-12-16 PROCEDURE — 250N000013 HC RX MED GY IP 250 OP 250 PS 637: Performed by: STUDENT IN AN ORGANIZED HEALTH CARE EDUCATION/TRAINING PROGRAM

## 2024-12-16 PROCEDURE — 83735 ASSAY OF MAGNESIUM: CPT | Performed by: INTERNAL MEDICINE

## 2024-12-16 PROCEDURE — 92526 ORAL FUNCTION THERAPY: CPT | Mod: GN

## 2024-12-16 PROCEDURE — 120N000001 HC R&B MED SURG/OB

## 2024-12-16 PROCEDURE — 84132 ASSAY OF SERUM POTASSIUM: CPT | Performed by: INTERNAL MEDICINE

## 2024-12-16 PROCEDURE — 85049 AUTOMATED PLATELET COUNT: CPT | Performed by: INTERNAL MEDICINE

## 2024-12-16 PROCEDURE — 36415 COLL VENOUS BLD VENIPUNCTURE: CPT | Performed by: INTERNAL MEDICINE

## 2024-12-16 PROCEDURE — 250N000011 HC RX IP 250 OP 636: Performed by: INTERNAL MEDICINE

## 2024-12-16 PROCEDURE — 250N000013 HC RX MED GY IP 250 OP 250 PS 637: Performed by: PHYSICIAN ASSISTANT

## 2024-12-16 PROCEDURE — 99222 1ST HOSP IP/OBS MODERATE 55: CPT | Performed by: PHYSICIAN ASSISTANT

## 2024-12-16 RX ORDER — IPRATROPIUM BROMIDE AND ALBUTEROL SULFATE 2.5; .5 MG/3ML; MG/3ML
3 SOLUTION RESPIRATORY (INHALATION) EVERY 6 HOURS PRN
Status: DISCONTINUED | OUTPATIENT
Start: 2024-12-16 | End: 2024-12-26 | Stop reason: HOSPADM

## 2024-12-16 RX ORDER — MIRTAZAPINE 7.5 MG/1
7.5 TABLET, FILM COATED ORAL AT BEDTIME
Status: DISCONTINUED | OUTPATIENT
Start: 2024-12-16 | End: 2024-12-18

## 2024-12-16 RX ADMIN — METOPROLOL TARTRATE 50 MG: 50 TABLET, FILM COATED ORAL at 08:24

## 2024-12-16 RX ADMIN — AMLODIPINE BESYLATE 10 MG: 10 TABLET ORAL at 08:24

## 2024-12-16 RX ADMIN — Medication 1 MG: at 22:01

## 2024-12-16 RX ADMIN — ACETAMINOPHEN 1000 MG: 500 TABLET, FILM COATED ORAL at 22:01

## 2024-12-16 RX ADMIN — MIRTAZAPINE 7.5 MG: 7.5 TABLET, FILM COATED ORAL at 22:01

## 2024-12-16 RX ADMIN — LOSARTAN POTASSIUM 50 MG: 50 TABLET, FILM COATED ORAL at 08:24

## 2024-12-16 RX ADMIN — INSULIN GLARGINE 15 UNITS: 100 INJECTION, SOLUTION SUBCUTANEOUS at 22:55

## 2024-12-16 RX ADMIN — HEPARIN SODIUM 5000 UNITS: 5000 INJECTION, SOLUTION INTRAVENOUS; SUBCUTANEOUS at 15:59

## 2024-12-16 RX ADMIN — CLOPIDOGREL BISULFATE 75 MG: 75 TABLET ORAL at 08:24

## 2024-12-16 RX ADMIN — ACETAMINOPHEN 1000 MG: 500 TABLET, FILM COATED ORAL at 15:58

## 2024-12-16 RX ADMIN — PSYLLIUM HUSK 1 PACKET: 3.4 POWDER ORAL at 12:29

## 2024-12-16 RX ADMIN — INSULIN ASPART 1 UNITS: 100 INJECTION, SOLUTION INTRAVENOUS; SUBCUTANEOUS at 22:54

## 2024-12-16 RX ADMIN — ACETAMINOPHEN 1000 MG: 500 TABLET, FILM COATED ORAL at 08:24

## 2024-12-16 RX ADMIN — HEPARIN SODIUM 5000 UNITS: 5000 INJECTION, SOLUTION INTRAVENOUS; SUBCUTANEOUS at 08:28

## 2024-12-16 RX ADMIN — OLANZAPINE 5 MG: 5 TABLET, ORALLY DISINTEGRATING ORAL at 15:59

## 2024-12-16 RX ADMIN — SERTRALINE HYDROCHLORIDE 100 MG: 100 TABLET ORAL at 08:24

## 2024-12-16 RX ADMIN — FUROSEMIDE 40 MG: 10 INJECTION, SOLUTION INTRAMUSCULAR; INTRAVENOUS at 08:28

## 2024-12-16 RX ADMIN — SIMVASTATIN 20 MG: 20 TABLET, FILM COATED ORAL at 22:01

## 2024-12-16 RX ADMIN — SENNOSIDES AND DOCUSATE SODIUM 2 TABLET: 50; 8.6 TABLET ORAL at 08:24

## 2024-12-16 RX ADMIN — ALLOPURINOL 200 MG: 100 TABLET ORAL at 08:23

## 2024-12-16 RX ADMIN — AMLODIPINE BESYLATE 10 MG: 10 TABLET ORAL at 22:01

## 2024-12-16 RX ADMIN — OLANZAPINE 5 MG: 5 TABLET, ORALLY DISINTEGRATING ORAL at 04:00

## 2024-12-16 RX ADMIN — METOPROLOL TARTRATE 50 MG: 50 TABLET, FILM COATED ORAL at 22:01

## 2024-12-16 RX ADMIN — POLYETHYLENE GLYCOL 3350 17 G: 17 POWDER, FOR SOLUTION ORAL at 08:28

## 2024-12-16 ASSESSMENT — ACTIVITIES OF DAILY LIVING (ADL)
ADLS_ACUITY_SCORE: 90

## 2024-12-16 NOTE — PLAN OF CARE
Goal Outcome Evaluation:    History: 12/16/24 [id number]  Primary Diagnosis: AHRF, AMS  Orientation: A/Ox3, disorientated to situation. Can be agitated at times  Activity: Ax2 w/ lift, Turn and repo  Diet/BS Checks: Regular diet,   Tele:  NA  IV Access/Drains: R PIV SL  Pain Management: denies  Abnormal VS/Results: VSS on 2.5L NC, wean as possible  Bowel/Bladder: purwick in place, incontinent, bladder scanned for 360 after incontinence episode  Skin/Wounds: redness blancheable on coccyx, scattered bruising, scabs on forearms, thighs, and shins; more severe bruising and skin breakdown around wrists, mepilex in place  Consults: psych consult pending  D/C Disposition: pending improvement or psych eval  Other Info: pt can be combative and aggressive, prn zyprexa available

## 2024-12-16 NOTE — PROGRESS NOTES
"CLINICAL NUTRITION SERVICES - REASSESSMENT NOTE    Recommendations Ordered by Registered Dietitian (RD):   Discontinue Nepro per request.   Encouraged po intake, emphasizing the importance of protein to preserve lean muscle mass    Malnutrition:    % Weight Loss:  Weight loss does not meet criteria for malnutrition  % Intake:  Decreased intake does not meet criteria for malnutrition   Subcutaneous Fat Loss:  Orbital region mild-moderate depletion (only 1 indicator, not using as criteria)  Muscle Loss:  Temporal region mild-moderate depletion, Clavicle mild depletion  Fluid Retention:  Mild 2+ dependent, Trace BLE    Malnutrition Diagnosis: Patient does not meet two of the above criteria necessary for diagnosing malnutrition     EVALUATION OF PROGRESS TOWARD GOALS   Diet:  Soft and Bite Sized diet, thin liquids, Room Service Appropriate with assist, and beauchamp Nepro at 10am     Intake/Tolerance:  Flowsheets show a good appetite and 1-3 intakes per day of %; refused lunch one day since last RD visit.     ASSESSED NUTRITION NEEDS:  Dosing Weight 65.5 kg (adjusted)  Estimated Energy Needs: 1001-9290 kcals (25-30 Kcal/Kg)  Justification: maintenance  Estimated Protein Needs: 79-98 grams protein (1.2-1.5 g pro/Kg)  Justification: preservation of lean body mass    NEW FINDINGS:   General/Plan:  Pt was resting in bed while reporting her po intake to be going \"good.\" She said she's been drinking the daily Nepro but has grown sick of them and asked to stop sending them. Pt was encouraged to order a source of protein with each meal. She was relatively short with her answers but had no additional nutrition concerns at this time     Weight:   Date/Time Weight Weight Method   12/15/24 0638 88.7 kg (195 lb 8.8 oz) Bed scale   12/14/24 0646 88.7 kg (195 lb 8.8 oz) Bed scale   12/12/24 0600 91.8 kg (202 lb 6.1 oz) Bed scale   12/11/24 0600 92 kg (202 lb 13.2 oz) Bed scale   12/10/24 0500 91.7 kg (202 lb 2.6 oz) Bed scale "   12/07/24 2100 91.7 kg (202 lb 2.6 oz) Bed scale     GI: LBM 1x on 12/11    Meds:   Lasix  Medium sliding scale insulin  15 units lantus  Miralax  B12    Labs:   BUN 29.5 (H)  Phos 7 (H)  -212 (H)     Previous Goals:   Patient to consume at least 50-75% of meals and 1 supplement daily  Evaluation: Met    Previous Nutrition Diagnosis:   Inadequate oral intake related to reduced appetite as evidenced by patient frequently refusing meals, some days only 0-25% intakes, need for ONS to help meet nutritional needs  Evaluation: Improving    MALNUTRITION  % Weight Loss:  Weight loss does not meet criteria for malnutrition  % Intake:  Decreased intake does not meet criteria for malnutrition   Subcutaneous Fat Loss:  Orbital region mild-moderate depletion (only 1 indicator, not using as criteria)  Muscle Loss:  Temporal region mild-moderate depletion, Clavicle mild depletion  Fluid Retention:  Mild 2+ dependent, Trace BLE    Malnutrition Diagnosis: Patient does not meet two of the above criteria necessary for diagnosing malnutrition    CURRENT NUTRITION DIAGNOSIS  Inadequate oral intake related to reduced appetite as evidenced by patient intermittently refusing meals but variable intakes have been improving to % intakes    INTERVENTIONS  Recommendations / Nutrition Prescription  Discontinue Nepro per request.   Encouraged po intake, emphasizing the importance of protein to preserve lean muscle mass   Continue Soft and Bite Sized diet, thin liquids     Implementation  General/Healthful diet     Goals  PO - >>75% meal trays TID    MONITORING AND EVALUATION:  Progress towards goals will be monitored and evaluated per protocol and Practice Guideline    Kristian Car MS, RD, LD

## 2024-12-16 NOTE — PLAN OF CARE
Goal Outcome Evaluation:    History: 12/15/24-12/16/24; [id number]  Primary Diagnosis: AHRF, AMS  Orientation: A/O x self  Activity: A2 Lift  Diet/BS Checks: Regular diet,  and 158 with no corrections  Tele:  NA  IV Access/Drains: R PIV SL  Pain Management: denies  Abnormal VS/Results: mildly soft BP on 2L NC with humidification; amlodipine held  Bowel/Bladder: incont, no BM this shift, minimal UOP this shift, bladder scan of 453ml at 4am, purewick in place  Skin/Wounds: redness blancheable on coccyx, scattered bruising, scabs on forearms, thighs, and shins; more severe bruising and skin breakdown around wrists, mepilex in place  Consults: psych consult  D/C Disposition: pending improvement or psych eval  Other Info: pt can be combative and aggressive, prn zyprexa given twice

## 2024-12-16 NOTE — CONSULTS
Initial Psychiatric Consult   Consult date: December 16, 2024         Reason for Consult, requesting source:      Requesting source: Tee Linares    Labs and imaging reviewed. Patient seen and evaluated by Francisco Javier Ramey PA-C          HPI:   Jessika Garcia is a 74 year old female with history of HFpEF, HTN, CVA w/ L side deficits/ataxia/impaired mobility (wheelchair bound), asthma, T2DM on insulin, gout, HLD who was admitted on 12/7/2024 with AHRF and altered mental status.     Psychiatry consulted to evaluate encephalopathy.     On approach patient is resting in hospital bed. Wakes with minimal prompting. Is aware she is in hospital, but not for reason of admission. States she is having difficulty sleeping but cannot say what wakes her. Feels that she is eating well. States she is going to the bathroom without issue. Bedside staff states she has not had a documented BM for several days. Patient then asks to get out of bed, but no wheelchair is available, and she is wheelchair dependent.          Past Psychiatric History:           Substance Use and History:           Past Medical History:   PAST MEDICAL HISTORY: No past medical history on file.    PAST SURGICAL HISTORY: No past surgical history on file.          Family History:   FAMILY HISTORY: No family history on file.    Family Psychiatric History:         Social History:   SOCIAL HISTORY:   Social History     Tobacco Use    Smoking status: Never    Smokeless tobacco: Not on file   Substance Use Topics    Alcohol use: No                Physical ROS:   The 10 point Review of Systems is negative other than noted in the HPI or here.           Medications:     Current Facility-Administered Medications   Medication Dose Route Frequency Provider Last Rate Last Admin    acetaminophen (TYLENOL) tablet 1,000 mg  1,000 mg Oral TID Es Wolff MD   1,000 mg at 12/16/24 0824    allopurinol (ZYLOPRIM) tablet 200 mg  200 mg Oral Daily Tee Linares MD    200 mg at 12/16/24 0823    amLODIPine (NORVASC) tablet 10 mg  10 mg Oral BID Joslyn Reddy DO   10 mg at 12/16/24 0824    clopidogrel (PLAVIX) tablet 75 mg  75 mg Oral Daily Tee Linares MD   75 mg at 12/16/24 0824    cyanocobalamin injection 1,000 mcg  1,000 mcg Intramuscular Q7 Days Karen Olguin MD   1,000 mcg at 12/13/24 1302    furosemide (LASIX) injection 40 mg  40 mg Intravenous Daily Es Wolff MD   40 mg at 12/16/24 0828    heparin ANTICOAGULANT injection 5,000 Units  5,000 Units Subcutaneous Q8H Joslyn Reddy DO   5,000 Units at 12/16/24 0828    insulin aspart (NovoLOG) injection (RAPID ACTING)  1-7 Units Subcutaneous TID AC Es Wolff MD   2 Units at 12/15/24 1826    insulin aspart (NovoLOG) injection (RAPID ACTING)  1-5 Units Subcutaneous At Bedtime Es Wolff MD        insulin glargine (LANTUS PEN) injection 15 Units  15 Units Subcutaneous At Bedtime Joslyn Reddy DO   15 Units at 12/15/24 2342    Lidocaine (LIDOCARE) 4 % Patch 1 patch  1 patch Transdermal Q24H Es Wolff MD   1 patch at 12/14/24 1001    losartan (COZAAR) tablet 50 mg  50 mg Oral Daily Gustavo Bose MD   50 mg at 12/16/24 0824    metoprolol tartrate (LOPRESSOR) tablet 50 mg  50 mg Oral BID Bharathi Roman DO   50 mg at 12/16/24 0824    polyethylene glycol (MIRALAX) Packet 17 g  17 g Oral Daily Tee Linares MD   17 g at 12/16/24 0828    senna-docusate (SENOKOT-S/PERICOLACE) 8.6-50 MG per tablet 2 tablet  2 tablet Oral BID Es Wolff MD   2 tablet at 12/16/24 0824    sertraline (ZOLOFT) tablet 100 mg  100 mg Oral Daily Es Wolff MD   100 mg at 12/16/24 0824    simvastatin (ZOCOR) tablet 20 mg  20 mg Oral At Bedtime Tee Linares MD   20 mg at 12/15/24 9964    sodium chloride (PF) 0.9% PF flush 3 mL  3 mL Intracatheter Q8H Tee Linares MD   3 mL at 12/16/24 0830              Allergies:   No Known  Allergies       Labs:     Recent Results (from the past 48 hours)   Platelet count    Collection Time: 12/14/24  9:17 AM   Result Value Ref Range    Platelet Count 296 150 - 450 10e3/uL   Magnesium    Collection Time: 12/14/24  9:17 AM   Result Value Ref Range    Magnesium 2.8 (H) 1.7 - 2.3 mg/dL   Potassium    Collection Time: 12/14/24  9:17 AM   Result Value Ref Range    Potassium 4.9 3.4 - 5.3 mmol/L   Procalcitonin    Collection Time: 12/14/24  9:17 AM   Result Value Ref Range    Procalcitonin 0.23 <0.50 ng/mL   Glucose by meter    Collection Time: 12/14/24 12:00 PM   Result Value Ref Range    GLUCOSE BY METER POCT 230 (H) 70 - 99 mg/dL   Glucose by meter    Collection Time: 12/14/24  5:22 PM   Result Value Ref Range    GLUCOSE BY METER POCT 140 (H) 70 - 99 mg/dL   Glucose by meter    Collection Time: 12/14/24  9:15 PM   Result Value Ref Range    GLUCOSE BY METER POCT 139 (H) 70 - 99 mg/dL   Glucose by meter    Collection Time: 12/15/24  7:35 AM   Result Value Ref Range    GLUCOSE BY METER POCT 111 (H) 70 - 99 mg/dL   Magnesium    Collection Time: 12/15/24 10:52 AM   Result Value Ref Range    Magnesium 2.2 1.7 - 2.3 mg/dL   Basic metabolic panel    Collection Time: 12/15/24 10:52 AM   Result Value Ref Range    Sodium 141 135 - 145 mmol/L    Potassium 4.3 3.4 - 5.3 mmol/L    Chloride 103 98 - 107 mmol/L    Carbon Dioxide (CO2) 29 22 - 29 mmol/L    Anion Gap 9 7 - 15 mmol/L    Urea Nitrogen 29.5 (H) 8.0 - 23.0 mg/dL    Creatinine 0.57 0.51 - 0.95 mg/dL    GFR Estimate >90 >60 mL/min/1.73m2    Calcium 8.8 8.8 - 10.4 mg/dL    Glucose 201 (H) 70 - 99 mg/dL   CBC with platelets    Collection Time: 12/15/24 10:52 AM   Result Value Ref Range    WBC Count 7.1 4.0 - 11.0 10e3/uL    RBC Count 3.33 (L) 3.80 - 5.20 10e6/uL    Hemoglobin 9.8 (L) 11.7 - 15.7 g/dL    Hematocrit 32.1 (L) 35.0 - 47.0 %    MCV 96 78 - 100 fL    MCH 29.4 26.5 - 33.0 pg    MCHC 30.5 (L) 31.5 - 36.5 g/dL    RDW 15.7 (H) 10.0 - 15.0 %    Platelet  "Count 288 150 - 450 10e3/uL   Extra Green Top (Lithium Heparin) Tube    Collection Time: 12/15/24 10:52 AM   Result Value Ref Range    Hold Specimen JIC    Glucose by meter    Collection Time: 12/15/24 11:11 AM   Result Value Ref Range    GLUCOSE BY METER POCT 185 (H) 70 - 99 mg/dL   Glucose by meter    Collection Time: 12/15/24  6:18 PM   Result Value Ref Range    GLUCOSE BY METER POCT 212 (H) 70 - 99 mg/dL   Glucose by meter    Collection Time: 12/15/24 11:31 PM   Result Value Ref Range    GLUCOSE BY METER POCT 180 (H) 70 - 99 mg/dL   Glucose by meter    Collection Time: 12/16/24  2:16 AM   Result Value Ref Range    GLUCOSE BY METER POCT 158 (H) 70 - 99 mg/dL   Glucose by meter    Collection Time: 12/16/24  8:23 AM   Result Value Ref Range    GLUCOSE BY METER POCT 122 (H) 70 - 99 mg/dL          Physical and Psychiatric Examination:     BP (!) 153/62 (BP Location: Left arm)   Pulse 64   Temp 97.9  F (36.6  C) (Oral)   Resp 18   Ht 1.651 m (5' 5\")   Wt 88.7 kg (195 lb 8.8 oz)   SpO2 97%   BMI 32.54 kg/m    Weight is 195 lbs 8.77 oz  Body mass index is 32.54 kg/m .    Physical Exam:  I have reviewed the physical exam as documented by by the medical team and agree with findings and assessment and have no additional findings to add at this time.    Mental Status Exam:    Appearance: dressed in hospital scrubs  Attitude:  cooperative  Eye Contact:  fair  Mood:  anxious  Affect:  mood congruent  Speech:  mumbling  Language: Fluent in english   Psychomotor Behavior:  no evidence of tardive dyskinesia, dystonia, or tics  Thought Process:  logical  Associations:  no loose associations  Thought Content:  no evidence of suicidal ideation or homicidal ideation  Insight:  limited  Judgement:  limited  Oriented to:  time, person, and place  Attention Span and Concentration:  fair  Recent and Remote Memory:  limited  Fund of Knowledge: Appropriate   Gait and Station:                DSM-5 Diagnosis:   Acute " encephalopathy          Assessment:   Confusion. Recommend delirium precautions: Minimize opiates, anticholiergics, benzodiazepines.   Generally haldol is preferred, however given its potential for QTC prolongation, and her Inferior ischiemia it would be prudent to avoid insulting heart tissue, so Ok to continue zyprexa for now.   If possible encourage activity during day: lights on, sitting up, transfer to chair or wheelchair, engage in any activity, open blinds. Conversely encourage sleep at night: minimize lights, noise, and any other stimulus      Continue antidepressants, with exception of TCAs    Constipation could be contributing.             Summary of Recommendations:   Delirium precautions ordered.   Continue Zoloft 100mg, Initiate Remeron 7.5mg, Initiate psyllium packets daily.  Please re-consult psychiatry with any ongoing needs.     Francisco Javier Ramey PA-C

## 2024-12-16 NOTE — PROGRESS NOTES
Care Management Follow Up    Length of Stay (days): 9    Expected Discharge Date: 12/17/2024     Concerns to be Addressed: discharge planning  Return to [facility]  Patient plan of care discussed at interdisciplinary rounds: Yes    Anticipated Discharge Disposition: Transitional Care  Disposition Comments: Return to Long Term care           Anticipated Discharge Services: None  Anticipated Discharge DME: None    Patient/family educated on Medicare website which has current facility and service quality ratings: no  Education Provided on the Discharge Plan: No  Patient/Family in Agreement with the Plan: yes    Referrals Placed by CM/SW: Post Acute Facilities  Private pay costs discussed: N/a    Discussed  Partnership in Safe Discharge Planning  document with patient/family: No     Handoff Completed: No, handoff not indicated or clinically appropriate    Additional Information:  Writer followed up with Gris CAPELLAN at [phone] from [place]. Gris stated if medically ready, patient can readmit tomorrow at anytime. A medical stretcher was set up for tomorrow between [id number]. Stretcher ride requested for needing a supervisor, confusion, and combativeness. Writer called and updated patient's sister Rosalia of the discharge plan. Rosalia is in agreement. PCS completed.     Addendum 1603     Discharge orders requested for patient pending medical readiness.     Next Steps: continue to follow for discharge planning, orders    Justin DSOUZA  [facility]  Inpatient Care Coordination
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
## 2024-12-16 NOTE — PROVIDER NOTIFICATION
MD Notification    Notified Person: MD    Notified Person Name: Bharathi Roman    Notification Date/Time: December 15, 2024; 8:35 PM     Notification Interaction: [employer] messaging    Purpose of Notification: No paremeters set for DBP for amlodipine or metoprolol while pts BP is 110/45 and HR 64. Would you like to hold meds?    Orders Received: Hold amlodipine but continue admin of metoprolol. Parameters put in for both.    Comments:

## 2024-12-16 NOTE — PROGRESS NOTES
[facility] RCAT Note    Date:12/16/2024  Admission Diagnosis: Heart Failure  Pulmonary History: Asthma  Home Nebulizer/ MDI Use: None  Home Oxygen Use: None  Acuity Level (from RT Assessment flow sheet): 4    Aerosol Therapy Initiated: Duoneb modified to Q6 PRN per RCAT protocol      Current Oxygen Requirement: 3 LPM  Current SpO2: 97%    See 'RT Assessments' flow sheet for patient assessment scoring and Acuity Level Details.

## 2024-12-16 NOTE — PLAN OF CARE
Orientation: Alert to self. Moaning and calling out, irritable with cares  Aggression Stop Light: Green/yellow, can be combative with cares  Activity: A2 lift, T/R  Diet/BS Checks: Reg with room service. BG checks ACHS   Tele: N/A  IV Access/Drains: R PIV SL  Pain Management: Denies. Refused Lidocaine patch   Abnormal VS/Results: 2-3L O2. HTN  K, mg replacement protocol. AM rechecks ordered   Bowel/Bladder: Incont b/b. PW in place   Skin/Wounds: Scattered bruising and scabbing. Preventative mepi to sacrum    Consults: Psych, neuro, SLP, SW. Pulm, cardiology, PT s/o  D/C Disposition: TBD     Other Info:    -Psych to see  -EEG completed   -Zyprexa given x1 for agitation

## 2024-12-16 NOTE — PROGRESS NOTES
Care Management Follow Up    Length of Stay (days): 9    Expected Discharge Date: 12/17/2024     Concerns to be Addressed: discharge planning  Return to [facility]  Patient plan of care discussed at interdisciplinary rounds: Yes    Anticipated Discharge Disposition: Transitional Care  Disposition Comments: Return to Long Term care     Anticipated Discharge Services: None  Anticipated Discharge DME: None    Patient/family educated on Medicare website which has current facility and service quality ratings: no  Education Provided on the Discharge Plan: No  Patient/Family in Agreement with the Plan: yes    Referrals Placed by CM/SW: Post Acute Facilities  Private pay costs discussed: Not applicable    Discussed  Partnership in Safe Discharge Planning  document with patient/family: No     Handoff Completed: No, handoff not indicated or clinically appropriate    Additional Information:  Writer sent message to Kenyatta Antoine and asked if patient would be able to return tomorrow. Waiting on a response    Writer received response from Micah stating that patient should be fine to come back but contact for her return is Gris CAPELLAN @ [phone]     Next Steps: Discharge when medically stable     MEET Flores

## 2024-12-17 LAB
ANION GAP SERPL CALCULATED.3IONS-SCNC: 10 MMOL/L (ref 7–15)
BUN SERPL-MCNC: 40.4 MG/DL (ref 8–23)
CALCIUM SERPL-MCNC: 9.4 MG/DL (ref 8.8–10.4)
CHLORIDE SERPL-SCNC: 101 MMOL/L (ref 98–107)
CREAT SERPL-MCNC: 0.65 MG/DL (ref 0.51–0.95)
EGFRCR SERPLBLD CKD-EPI 2021: >90 ML/MIN/1.73M2
GLUCOSE BLDC GLUCOMTR-MCNC: 118 MG/DL (ref 70–99)
GLUCOSE BLDC GLUCOMTR-MCNC: 121 MG/DL (ref 70–99)
GLUCOSE BLDC GLUCOMTR-MCNC: 135 MG/DL (ref 70–99)
GLUCOSE BLDC GLUCOMTR-MCNC: 145 MG/DL (ref 70–99)
GLUCOSE BLDC GLUCOMTR-MCNC: 167 MG/DL (ref 70–99)
GLUCOSE SERPL-MCNC: 137 MG/DL (ref 70–99)
HCO3 SERPL-SCNC: 29 MMOL/L (ref 22–29)
HOLD SPECIMEN: NORMAL
MAGNESIUM SERPL-MCNC: 2.4 MG/DL (ref 1.7–2.3)
POTASSIUM SERPL-SCNC: 5 MMOL/L (ref 3.4–5.3)
SODIUM SERPL-SCNC: 140 MMOL/L (ref 135–145)

## 2024-12-17 PROCEDURE — 82310 ASSAY OF CALCIUM: CPT | Performed by: INTERNAL MEDICINE

## 2024-12-17 PROCEDURE — 250N000011 HC RX IP 250 OP 636: Performed by: INTERNAL MEDICINE

## 2024-12-17 PROCEDURE — 999N000128 HC STATISTIC PERIPHERAL IV START W/O US GUIDANCE

## 2024-12-17 PROCEDURE — 83735 ASSAY OF MAGNESIUM: CPT | Performed by: INTERNAL MEDICINE

## 2024-12-17 PROCEDURE — 250N000013 HC RX MED GY IP 250 OP 250 PS 637: Performed by: INTERNAL MEDICINE

## 2024-12-17 PROCEDURE — 250N000009 HC RX 250: Performed by: STUDENT IN AN ORGANIZED HEALTH CARE EDUCATION/TRAINING PROGRAM

## 2024-12-17 PROCEDURE — 120N000001 HC R&B MED SURG/OB

## 2024-12-17 PROCEDURE — 250N000013 HC RX MED GY IP 250 OP 250 PS 637: Performed by: STUDENT IN AN ORGANIZED HEALTH CARE EDUCATION/TRAINING PROGRAM

## 2024-12-17 PROCEDURE — 99239 HOSP IP/OBS DSCHRG MGMT >30: CPT | Performed by: INTERNAL MEDICINE

## 2024-12-17 PROCEDURE — 80048 BASIC METABOLIC PNL TOTAL CA: CPT | Performed by: INTERNAL MEDICINE

## 2024-12-17 PROCEDURE — 36415 COLL VENOUS BLD VENIPUNCTURE: CPT | Performed by: INTERNAL MEDICINE

## 2024-12-17 RX ORDER — NICOTINE POLACRILEX 4 MG
15-30 LOZENGE BUCCAL
DISCHARGE
Start: 2024-12-17

## 2024-12-17 RX ORDER — OLANZAPINE 5 MG/1
5 TABLET, ORALLY DISINTEGRATING ORAL EVERY 6 HOURS PRN
DISCHARGE
Start: 2024-12-17 | End: 2024-12-26

## 2024-12-17 RX ORDER — OLANZAPINE 10 MG/2ML
2.5 INJECTION, POWDER, FOR SOLUTION INTRAMUSCULAR DAILY PRN
Status: DISCONTINUED | OUTPATIENT
Start: 2024-12-17 | End: 2024-12-17

## 2024-12-17 RX ORDER — WATER 10 ML/10ML
INJECTION INTRAMUSCULAR; INTRAVENOUS; SUBCUTANEOUS
Status: COMPLETED
Start: 2024-12-17 | End: 2024-12-17

## 2024-12-17 RX ORDER — CYANOCOBALAMIN 1000 UG/ML
1000 INJECTION, SOLUTION INTRAMUSCULAR; SUBCUTANEOUS
DISCHARGE
Start: 2024-12-20

## 2024-12-17 RX ORDER — LOSARTAN POTASSIUM 50 MG/1
50 TABLET ORAL DAILY
DISCHARGE
Start: 2024-12-17

## 2024-12-17 RX ORDER — OLANZAPINE 10 MG/2ML
2.5 INJECTION, POWDER, FOR SOLUTION INTRAMUSCULAR DAILY PRN
Status: DISCONTINUED | OUTPATIENT
Start: 2024-12-17 | End: 2024-12-20

## 2024-12-17 RX ORDER — FUROSEMIDE 10 MG/ML
40 INJECTION INTRAMUSCULAR; INTRAVENOUS ONCE
Status: COMPLETED | OUTPATIENT
Start: 2024-12-17 | End: 2024-12-17

## 2024-12-17 RX ORDER — FUROSEMIDE 10 MG/ML
40 INJECTION INTRAMUSCULAR; INTRAVENOUS EVERY 8 HOURS
Status: DISCONTINUED | OUTPATIENT
Start: 2024-12-17 | End: 2024-12-18

## 2024-12-17 RX ORDER — IPRATROPIUM BROMIDE AND ALBUTEROL SULFATE 2.5; .5 MG/3ML; MG/3ML
3 SOLUTION RESPIRATORY (INHALATION) EVERY 6 HOURS PRN
DISCHARGE
Start: 2024-12-17

## 2024-12-17 RX ORDER — ACETAMINOPHEN 325 MG/1
650 TABLET ORAL EVERY 4 HOURS PRN
DISCHARGE
Start: 2024-12-17

## 2024-12-17 RX ORDER — MIRTAZAPINE 7.5 MG/1
7.5 TABLET, FILM COATED ORAL AT BEDTIME
DISCHARGE
Start: 2024-12-17

## 2024-12-17 RX ORDER — SERTRALINE HYDROCHLORIDE 100 MG/1
100 TABLET, FILM COATED ORAL DAILY
DISCHARGE
Start: 2024-12-17 | End: 2025-01-06

## 2024-12-17 RX ADMIN — ACETAMINOPHEN 650 MG: 650 SUPPOSITORY RECTAL at 13:58

## 2024-12-17 RX ADMIN — OLANZAPINE 5 MG: 5 TABLET, ORALLY DISINTEGRATING ORAL at 10:22

## 2024-12-17 RX ADMIN — WATER 10 ML: 1 INJECTION INTRAMUSCULAR; INTRAVENOUS; SUBCUTANEOUS at 22:51

## 2024-12-17 RX ADMIN — FUROSEMIDE 40 MG: 10 INJECTION, SOLUTION INTRAMUSCULAR; INTRAVENOUS at 21:08

## 2024-12-17 RX ADMIN — METOPROLOL TARTRATE 50 MG: 50 TABLET, FILM COATED ORAL at 08:44

## 2024-12-17 RX ADMIN — INSULIN GLARGINE 15 UNITS: 100 INJECTION, SOLUTION SUBCUTANEOUS at 23:43

## 2024-12-17 RX ADMIN — SERTRALINE HYDROCHLORIDE 100 MG: 100 TABLET ORAL at 08:43

## 2024-12-17 RX ADMIN — FUROSEMIDE 40 MG: 10 INJECTION, SOLUTION INTRAMUSCULAR; INTRAVENOUS at 13:33

## 2024-12-17 RX ADMIN — LOSARTAN POTASSIUM 50 MG: 50 TABLET, FILM COATED ORAL at 08:45

## 2024-12-17 RX ADMIN — HEPARIN SODIUM 5000 UNITS: 5000 INJECTION, SOLUTION INTRAVENOUS; SUBCUTANEOUS at 00:31

## 2024-12-17 RX ADMIN — HEPARIN SODIUM 5000 UNITS: 5000 INJECTION, SOLUTION INTRAVENOUS; SUBCUTANEOUS at 23:43

## 2024-12-17 RX ADMIN — FUROSEMIDE 40 MG: 10 INJECTION, SOLUTION INTRAMUSCULAR; INTRAVENOUS at 10:07

## 2024-12-17 RX ADMIN — OLANZAPINE 2.5 MG: 10 INJECTION, POWDER, FOR SOLUTION INTRAMUSCULAR at 22:51

## 2024-12-17 ASSESSMENT — ACTIVITIES OF DAILY LIVING (ADL)
ADLS_ACUITY_SCORE: 90
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
## 2024-12-17 NOTE — PROGRESS NOTES
After I completed and discharge summary patient was noted to have increased oxygen needs, she was also very sleepy earlier, more awake now and trying to pull lines out, she currently did not have an IV line, we had to place 1 to give her Lasix, will closely monitor the patient another night prior to planning discharge again, her discharge is currently on hold due to increased O2 need.  She does communicate appropriately but occasionally confused.

## 2024-12-17 NOTE — PLAN OF CARE
12/16/24 2300 - 12/17/24 [id number]    Orientation: PETEY. Incoherent speech.  Aggression Stop Light: Green   Activity: A2 lift. Q2 T/R   Diet/BS Checks: Soft bite size, thin liquids. ACHS. No corrections given.   Tele: N/A  IV Access/Drains: No IV access   Pain Management: Denies pain   Abnormal VS/Results: VSS on 2.5L NC. Keep sat >92  Bowel/Bladder: Incontinent of B/B. No BM during shift. Refuses purewick. Brief in place.  Skin/Wounds: Blanchable redness on sacrum/coccyx, scattered bruising,   Consults: Psych  D/C Disposition: Discharge today to [facility]

## 2024-12-17 NOTE — PROGRESS NOTES
Pt A&O x3; disorientated to situation. Assist x2 w/ turn and repositioning. Tolerating a soft and bite sized diet. Voiding in the purewick. On 2.5L NC, No IV access. PRN Zyprexa given x1. BG  checks- no coverage needed. Plan of care ongoing.

## 2024-12-17 NOTE — PLAN OF CARE
Goal Outcome Evaluation:  12/16/24 [id number]  Primary Diagnosis: AHRF, AMS  Orientation: A&Ox2-3, disorientated to situation and time. Calm and cooperative, Can be agitated at times  Activity: Ax2 w/lift, Turn and repo  Diet/BS Checks: Soft and bite size ,   Tele:  NA  IV Access/Drains: R PIV SL  Pain Management: denies  Abnormal VS/Results: VSS on 2.5 -3L NC, unable to wean, pt is at 80% on RA  Bowel/Bladder: incontinent of B/B, pt refused purewick  Skin/Wounds: redness blancheable on coccyx, scattered bruising, scabs on forearms, thighs, and shins; more severe bruising and skin breakdown around wrists, mepilex in place  Consults: psych following  D/C Disposition: pending  12/17 back to [facility]   Other Info: pt can be combative and aggressive at times - prn zyprexa available

## 2024-12-17 NOTE — PROGRESS NOTES
Care Management Discharge Note    Discharge Date: 12/17/2024       Discharge Disposition: Long Term Care    Discharge Services: None    Discharge DME: None    Discharge Transportation: agency    Private pay costs discussed: Not applicable    Does the patient's insurance plan have a 3 day qualifying hospital stay waiver?  No    PAS Confirmation Code:    Patient/family educated on Medicare website which has current facility and service quality ratings: no    Education Provided on the Discharge Plan: No  Persons Notified of Discharge Plans: yes  Patient/Family in Agreement with the Plan: yes    Handoff Referral Completed: No, handoff not indicated or clinically appropriate    Additional Information:  Patient will discharge today back to [facility] care facility by medical stretcher between [id number]. Stretcher necessity for confusion, agitation and needing supervision.  Gris at [facility] updated on patient's medical readiness to discharge today. Gris also updated on transport time. Gris was in agreement with the discharge. Orders were received and faxed to Gris. Gris confirmed they were received. Writer called and updated patient's sister Rosalia of the discharge plan. PCS was previously completed. Facility, Ros[facility], [facility] and floor nurse were updated and in agreement with the discharge.       Addendum [id number]    Writer received an updated from physician that patient will not discharge today. Writer updated Gris and Rosalia of the change. Medical transport cancelled.  will continue to follow and provide updates for discharge planning.      Justin DSOUZA  [facility]  Inpatient Care Coordination

## 2024-12-18 ENCOUNTER — APPOINTMENT (OUTPATIENT)
Dept: SPEECH THERAPY | Facility: CLINIC | Age: 74
End: 2024-12-18
Attending: PSYCHIATRY & NEUROLOGY
Payer: COMMERCIAL

## 2024-12-18 LAB
ANION GAP SERPL CALCULATED.3IONS-SCNC: 9 MMOL/L (ref 7–15)
BUN SERPL-MCNC: 26.5 MG/DL (ref 8–23)
CALCIUM SERPL-MCNC: 9.5 MG/DL (ref 8.8–10.4)
CHLORIDE SERPL-SCNC: 99 MMOL/L (ref 98–107)
CREAT SERPL-MCNC: 0.53 MG/DL (ref 0.51–0.95)
EGFRCR SERPLBLD CKD-EPI 2021: >90 ML/MIN/1.73M2
GLUCOSE BLDC GLUCOMTR-MCNC: 108 MG/DL (ref 70–99)
GLUCOSE BLDC GLUCOMTR-MCNC: 113 MG/DL (ref 70–99)
GLUCOSE BLDC GLUCOMTR-MCNC: 126 MG/DL (ref 70–99)
GLUCOSE BLDC GLUCOMTR-MCNC: 92 MG/DL (ref 70–99)
GLUCOSE SERPL-MCNC: 85 MG/DL (ref 70–99)
HCO3 SERPL-SCNC: 36 MMOL/L (ref 22–29)
MAGNESIUM SERPL-MCNC: 1.8 MG/DL (ref 1.7–2.3)
POTASSIUM SERPL-SCNC: 3.8 MMOL/L (ref 3.4–5.3)
SODIUM SERPL-SCNC: 144 MMOL/L (ref 135–145)

## 2024-12-18 PROCEDURE — 250N000013 HC RX MED GY IP 250 OP 250 PS 637: Performed by: INTERNAL MEDICINE

## 2024-12-18 PROCEDURE — 250N000013 HC RX MED GY IP 250 OP 250 PS 637: Performed by: STUDENT IN AN ORGANIZED HEALTH CARE EDUCATION/TRAINING PROGRAM

## 2024-12-18 PROCEDURE — 92526 ORAL FUNCTION THERAPY: CPT | Mod: GN | Performed by: SPEECH-LANGUAGE PATHOLOGIST

## 2024-12-18 PROCEDURE — 120N000001 HC R&B MED SURG/OB

## 2024-12-18 PROCEDURE — 250N000013 HC RX MED GY IP 250 OP 250 PS 637: Performed by: PHYSICIAN ASSISTANT

## 2024-12-18 PROCEDURE — 83735 ASSAY OF MAGNESIUM: CPT | Performed by: INTERNAL MEDICINE

## 2024-12-18 PROCEDURE — 99233 SBSQ HOSP IP/OBS HIGH 50: CPT | Performed by: PHYSICIAN ASSISTANT

## 2024-12-18 PROCEDURE — 80048 BASIC METABOLIC PNL TOTAL CA: CPT | Performed by: INTERNAL MEDICINE

## 2024-12-18 PROCEDURE — 36415 COLL VENOUS BLD VENIPUNCTURE: CPT | Performed by: INTERNAL MEDICINE

## 2024-12-18 PROCEDURE — 99233 SBSQ HOSP IP/OBS HIGH 50: CPT | Performed by: INTERNAL MEDICINE

## 2024-12-18 PROCEDURE — 82310 ASSAY OF CALCIUM: CPT | Performed by: INTERNAL MEDICINE

## 2024-12-18 PROCEDURE — 250N000011 HC RX IP 250 OP 636: Performed by: INTERNAL MEDICINE

## 2024-12-18 RX ORDER — NALOXONE HYDROCHLORIDE 0.4 MG/ML
0.4 INJECTION, SOLUTION INTRAMUSCULAR; INTRAVENOUS; SUBCUTANEOUS
Status: DISCONTINUED | OUTPATIENT
Start: 2024-12-18 | End: 2024-12-26 | Stop reason: HOSPADM

## 2024-12-18 RX ORDER — NALOXONE HYDROCHLORIDE 0.4 MG/ML
0.2 INJECTION, SOLUTION INTRAMUSCULAR; INTRAVENOUS; SUBCUTANEOUS
Status: DISCONTINUED | OUTPATIENT
Start: 2024-12-18 | End: 2024-12-26 | Stop reason: HOSPADM

## 2024-12-18 RX ORDER — FUROSEMIDE 10 MG/ML
40 INJECTION INTRAMUSCULAR; INTRAVENOUS EVERY 12 HOURS
Status: DISCONTINUED | OUTPATIENT
Start: 2024-12-19 | End: 2024-12-19

## 2024-12-18 RX ORDER — OLANZAPINE 5 MG/1
5 TABLET, ORALLY DISINTEGRATING ORAL 2 TIMES DAILY
Status: DISCONTINUED | OUTPATIENT
Start: 2024-12-18 | End: 2024-12-20

## 2024-12-18 RX ORDER — HYDROMORPHONE HCL IN WATER/PF 6 MG/30 ML
0.2 PATIENT CONTROLLED ANALGESIA SYRINGE INTRAVENOUS EVERY 4 HOURS PRN
Status: DISCONTINUED | OUTPATIENT
Start: 2024-12-18 | End: 2024-12-25

## 2024-12-18 RX ORDER — BISACODYL 10 MG
10 SUPPOSITORY, RECTAL RECTAL DAILY PRN
Status: DISCONTINUED | OUTPATIENT
Start: 2024-12-18 | End: 2024-12-26 | Stop reason: HOSPADM

## 2024-12-18 RX ADMIN — FUROSEMIDE 40 MG: 10 INJECTION, SOLUTION INTRAMUSCULAR; INTRAVENOUS at 05:46

## 2024-12-18 RX ADMIN — HEPARIN SODIUM 5000 UNITS: 5000 INJECTION, SOLUTION INTRAVENOUS; SUBCUTANEOUS at 10:01

## 2024-12-18 RX ADMIN — SENNOSIDES AND DOCUSATE SODIUM 2 TABLET: 50; 8.6 TABLET ORAL at 09:59

## 2024-12-18 RX ADMIN — HYDROMORPHONE HYDROCHLORIDE 0.2 MG: 0.2 INJECTION, SOLUTION INTRAMUSCULAR; INTRAVENOUS; SUBCUTANEOUS at 18:54

## 2024-12-18 RX ADMIN — ACETAMINOPHEN 1000 MG: 500 TABLET, FILM COATED ORAL at 09:59

## 2024-12-18 RX ADMIN — FUROSEMIDE 40 MG: 10 INJECTION, SOLUTION INTRAMUSCULAR; INTRAVENOUS at 14:00

## 2024-12-18 RX ADMIN — INSULIN GLARGINE 15 UNITS: 100 INJECTION, SOLUTION SUBCUTANEOUS at 21:31

## 2024-12-18 RX ADMIN — ACETAMINOPHEN 1000 MG: 500 TABLET, FILM COATED ORAL at 17:28

## 2024-12-18 RX ADMIN — SIMVASTATIN 20 MG: 20 TABLET, FILM COATED ORAL at 21:30

## 2024-12-18 RX ADMIN — POLYETHYLENE GLYCOL 3350 17 G: 17 POWDER, FOR SOLUTION ORAL at 10:00

## 2024-12-18 RX ADMIN — AMLODIPINE BESYLATE 10 MG: 10 TABLET ORAL at 21:30

## 2024-12-18 RX ADMIN — OLANZAPINE 5 MG: 5 TABLET, ORALLY DISINTEGRATING ORAL at 21:30

## 2024-12-18 RX ADMIN — AMLODIPINE BESYLATE 10 MG: 10 TABLET ORAL at 09:59

## 2024-12-18 RX ADMIN — ALLOPURINOL 200 MG: 100 TABLET ORAL at 09:59

## 2024-12-18 RX ADMIN — LOSARTAN POTASSIUM 50 MG: 50 TABLET, FILM COATED ORAL at 09:59

## 2024-12-18 RX ADMIN — METOPROLOL TARTRATE 50 MG: 50 TABLET, FILM COATED ORAL at 21:30

## 2024-12-18 RX ADMIN — SENNOSIDES AND DOCUSATE SODIUM 2 TABLET: 50; 8.6 TABLET ORAL at 17:28

## 2024-12-18 RX ADMIN — OLANZAPINE 5 MG: 5 TABLET, ORALLY DISINTEGRATING ORAL at 10:22

## 2024-12-18 RX ADMIN — SERTRALINE HYDROCHLORIDE 100 MG: 100 TABLET ORAL at 09:59

## 2024-12-18 RX ADMIN — PSYLLIUM HUSK 1 PACKET: 3.4 POWDER ORAL at 10:01

## 2024-12-18 RX ADMIN — HYDROMORPHONE HYDROCHLORIDE 0.2 MG: 0.2 INJECTION, SOLUTION INTRAMUSCULAR; INTRAVENOUS; SUBCUTANEOUS at 15:01

## 2024-12-18 RX ADMIN — CLOPIDOGREL BISULFATE 75 MG: 75 TABLET ORAL at 09:59

## 2024-12-18 RX ADMIN — ACETAMINOPHEN 1000 MG: 500 TABLET, FILM COATED ORAL at 21:29

## 2024-12-18 RX ADMIN — METOPROLOL TARTRATE 50 MG: 50 TABLET, FILM COATED ORAL at 09:59

## 2024-12-18 RX ADMIN — HEPARIN SODIUM 5000 UNITS: 5000 INJECTION, SOLUTION INTRAVENOUS; SUBCUTANEOUS at 17:28

## 2024-12-18 RX ADMIN — LIDOCAINE 1 PATCH: 4 PATCH TOPICAL at 10:00

## 2024-12-18 ASSESSMENT — ACTIVITIES OF DAILY LIVING (ADL)
ADLS_ACUITY_SCORE: 95
ADLS_ACUITY_SCORE: 97
ADLS_ACUITY_SCORE: 91
ADLS_ACUITY_SCORE: 95
ADLS_ACUITY_SCORE: 91
ADLS_ACUITY_SCORE: 95
ADLS_ACUITY_SCORE: 92
ADLS_ACUITY_SCORE: 91
ADLS_ACUITY_SCORE: 95
ADLS_ACUITY_SCORE: 91
ADLS_ACUITY_SCORE: 95

## 2024-12-18 NOTE — CONSULTS
Initial Psychiatric Consult   Consult date: December 18, 2024         Reason for Consult, requesting source:      Requesting source: Tee Linares    Labs and imaging reviewed. Patient seen and evaluated by Francisco Javier Ramey PA-C          HPI:   Psych consulted for ongoing behavior problems.     On approach this AM patient is able to grunt in response to her name. She is not oriented to location or situation. Not able to answer any other questions.     Chart review reflects agitation and confusion primarily in evenings.         Past Psychiatric History:           Substance Use and History:           Past Medical History:   PAST MEDICAL HISTORY: No past medical history on file.    PAST SURGICAL HISTORY: No past surgical history on file.          Family History:   FAMILY HISTORY: No family history on file.    Family Psychiatric History:         Social History:   SOCIAL HISTORY:   Social History     Tobacco Use    Smoking status: Never    Smokeless tobacco: Not on file   Substance Use Topics    Alcohol use: No                Physical ROS:   The 10 point Review of Systems is negative other than noted in the HPI or here.           Medications:     Current Facility-Administered Medications   Medication Dose Route Frequency Provider Last Rate Last Admin    acetaminophen (TYLENOL) tablet 1,000 mg  1,000 mg Oral TID Es Wolff MD   1,000 mg at 12/16/24 2201    allopurinol (ZYLOPRIM) tablet 200 mg  200 mg Oral Daily Tee Linares MD   200 mg at 12/16/24 0823    amLODIPine (NORVASC) tablet 10 mg  10 mg Oral BID Joslyn Reddy DO   10 mg at 12/16/24 2201    clopidogrel (PLAVIX) tablet 75 mg  75 mg Oral Daily Tee Linares MD   75 mg at 12/16/24 0824    cyanocobalamin injection 1,000 mcg  1,000 mcg Intramuscular Q7 Days Karen Olguin MD   1,000 mcg at 12/13/24 1302    furosemide (LASIX) injection 40 mg  40 mg Intravenous Q8H Emily Hickman MD   40 mg at 12/18/24 0546    heparin  ANTICOAGULANT injection 5,000 Units  5,000 Units Subcutaneous Q8H Joslyn Reddy DO   5,000 Units at 12/17/24 2343    insulin aspart (NovoLOG) injection (RAPID ACTING)  1-7 Units Subcutaneous TID AC Es Wolff MD   2 Units at 12/15/24 1826    insulin aspart (NovoLOG) injection (RAPID ACTING)  1-5 Units Subcutaneous At Bedtime Es Wolff MD   1 Units at 12/16/24 2254    insulin glargine (LANTUS PEN) injection 15 Units  15 Units Subcutaneous At Bedtime Joslyn Reddy DO   15 Units at 12/17/24 2343    Lidocaine (LIDOCARE) 4 % Patch 1 patch  1 patch Transdermal Q24H Es Wolff MD   1 patch at 12/14/24 1001    losartan (COZAAR) tablet 50 mg  50 mg Oral Daily Gustavo Bose MD   50 mg at 12/17/24 0845    metoprolol tartrate (LOPRESSOR) tablet 50 mg  50 mg Oral BID Bharathi Roman DO   50 mg at 12/17/24 0844    mirtazapine (REMERON) tablet TABS 7.5 mg  7.5 mg Oral At Bedtime Francisco Javier Ramey PA-C   7.5 mg at 12/16/24 2201    polyethylene glycol (MIRALAX) Packet 17 g  17 g Oral Daily eTe Linares MD   17 g at 12/16/24 0828    psyllium (METAMUCIL/KONSYL) Packet 1 packet  1 packet Oral Daily Francisco Javier Ramey PA-C   1 packet at 12/16/24 1229    senna-docusate (SENOKOT-S/PERICOLACE) 8.6-50 MG per tablet 2 tablet  2 tablet Oral BID Es Wolff MD   2 tablet at 12/16/24 0824    sertraline (ZOLOFT) tablet 100 mg  100 mg Oral Daily Es Wolff MD   100 mg at 12/17/24 0843    simvastatin (ZOCOR) tablet 20 mg  20 mg Oral At Bedtime Tee Linares MD   20 mg at 12/16/24 2201    sodium chloride (PF) 0.9% PF flush 3 mL  3 mL Intracatheter Q8H Tee Linares MD   3 mL at 12/16/24 1610              Allergies:   No Known Allergies       Labs:     Recent Results (from the past 48 hours)   Platelet count    Collection Time: 12/16/24 10:26 AM   Result Value Ref Range    Platelet Count 283 150 - 450 10e3/uL   Glucose by meter    Collection Time: 12/16/24  11:35 AM   Result Value Ref Range    GLUCOSE BY METER POCT 174 (H) 70 - 99 mg/dL   Potassium    Collection Time: 12/16/24 12:43 PM   Result Value Ref Range    Potassium 4.9 3.4 - 5.3 mmol/L   Magnesium    Collection Time: 12/16/24 12:43 PM   Result Value Ref Range    Magnesium 2.3 1.7 - 2.3 mg/dL   Glucose by meter    Collection Time: 12/16/24  4:09 PM   Result Value Ref Range    GLUCOSE BY METER POCT 128 (H) 70 - 99 mg/dL   Glucose by meter    Collection Time: 12/16/24  9:54 PM   Result Value Ref Range    GLUCOSE BY METER POCT 262 (H) 70 - 99 mg/dL   Glucose by meter    Collection Time: 12/16/24 10:49 PM   Result Value Ref Range    GLUCOSE BY METER POCT 248 (H) 70 - 99 mg/dL   Glucose by meter    Collection Time: 12/17/24  1:47 AM   Result Value Ref Range    GLUCOSE BY METER POCT 167 (H) 70 - 99 mg/dL   Glucose by meter    Collection Time: 12/17/24  7:59 AM   Result Value Ref Range    GLUCOSE BY METER POCT 121 (H) 70 - 99 mg/dL   Magnesium    Collection Time: 12/17/24 11:29 AM   Result Value Ref Range    Magnesium 2.4 (H) 1.7 - 2.3 mg/dL   Basic metabolic panel    Collection Time: 12/17/24 11:29 AM   Result Value Ref Range    Sodium 140 135 - 145 mmol/L    Potassium 5.0 3.4 - 5.3 mmol/L    Chloride 101 98 - 107 mmol/L    Carbon Dioxide (CO2) 29 22 - 29 mmol/L    Anion Gap 10 7 - 15 mmol/L    Urea Nitrogen 40.4 (H) 8.0 - 23.0 mg/dL    Creatinine 0.65 0.51 - 0.95 mg/dL    GFR Estimate >90 >60 mL/min/1.73m2    Calcium 9.4 8.8 - 10.4 mg/dL    Glucose 137 (H) 70 - 99 mg/dL   Extra Purple Top EDTA (LAB USE ONLY)    Collection Time: 12/17/24 11:29 AM   Result Value Ref Range    Hold Specimen JIC    Glucose by meter    Collection Time: 12/17/24  1:11 PM   Result Value Ref Range    GLUCOSE BY METER POCT 145 (H) 70 - 99 mg/dL   Glucose by meter    Collection Time: 12/17/24  4:16 PM   Result Value Ref Range    GLUCOSE BY METER POCT 135 (H) 70 - 99 mg/dL   Glucose by meter    Collection Time: 12/17/24 10:16 PM   Result Value  "Ref Range    GLUCOSE BY METER POCT 118 (H) 70 - 99 mg/dL   Glucose by meter    Collection Time: 12/18/24  8:00 AM   Result Value Ref Range    GLUCOSE BY METER POCT 92 70 - 99 mg/dL          Physical and Psychiatric Examination:     BP (!) 179/64 (BP Location: Right arm)   Pulse 65   Temp 98.1  F (36.7  C) (Axillary)   Resp 18   Ht 1.651 m (5' 5\")   Wt 88.7 kg (195 lb 8.8 oz)   SpO2 96%   BMI 32.54 kg/m    Weight is 195 lbs 8.77 oz  Body mass index is 32.54 kg/m .    Physical Exam:  I have reviewed the physical exam as documented by by the medical team and agree with findings and assessment and have no additional findings to add at this time.    Mental Status Exam:    Appearance: Sedate  Attitude:    Eye Contact:    Mood:    Affect:    Speech:    Language: Fluent in english   Psychomotor Behavior:    Thought Process:    Associations:    Thought Content:    Insight:    Judgement:    Oriented to:    Attention Span and Concentration:    Recent and Remote Memory:    Fund of Knowledge:   Gait and Station:                DSM-5 Diagnosis:   Delirium          Assessment:   Patient continues to display symptoms of delirium.   Continued efforts to address any physical issues will be helpful. Ensure adequate oral intake, toilet-ing, and pain control.     Will schedule schedule zydis 5mg BID, must monitor for hypotension. Do not anticipate this will be a long term medication. Antipsychotics do carry black box warning for geriatric patient populations due to increased all cause mortality. Given her ongoing agitation that requires IM administration avoiding antipsychotics is not possible. These agents are preferential to benzodiazepine, or anticholinergics.   If confusion/agitation persists then will consider switching agent from Zyprexa to haldol. Zyprexa has more anticholonergic properites than haldol, however haldol can prolong QTC interval.     Behavior INTERVENTIONS :    Use a consistent positive physical approach  - " "gesture & greet by name   - offer your hand & make eye contact   - approach slowly within visual range   - shake hands & maintain hand-under-hand   - move to the side of the patient  - get to eye level & respect intimate space   - wait for acknowledgement     How you help     Sight or Visual cues    Verbal or Auditory cues    Touch or Tactile cues       Do s    Go with the FLOW    Use SUPPORTIVE communication techniques   - Use objects and the environment   - Give examples   - Use gestures and pointing   - Acknowledge & accept emotions   - Use empathy & Validation   - Use familiar phrases or known interests   - Respect  values  and  beliefs  - avoid the negative     DON Ts    Try to CONTROL the FLOW   - Give up reality orientation and BIG lies   - Do not correct errors   - Offer info if asked, monitoring the emotional state    Try to STOP the FLOW   - Don t reject topics   - Don t try to distract UNTIL you are well connected   - Keep VISUAL cues positive       3. Deal with the person's distress or frustration/anger   1.Try to figure out what the person really NEEDS or WANTS (\"It sounds like \" \"It looks like \" \"It seems like \" \"You're feeling \")   2.Use empathy not forced reality or lying   3.Once the person is listening and responding to you THEN -   4.Redirect his attention and actions to something that is OK OR   5.Distract him with other things or activities you know he likes & values     **Always BE CAREFUL about personal space and touch with the person especially when s/he is distressed or being forceful **    4.  How you respond to the person   1.use positive, friendly approval or praise (short, specific and sincere)   2.offer your thanks and appreciation for his/her efforts   3.laugh with him/her & appreciate attempts at humor & friendliness   4.  shake hands to start and end an interaction    5.  use touch - hugging, hand holding, comforting only IF the person wants it     If what you are doing is NOT " working -     ? STOP!     ? BACK OFF - give the person some space and time     ? Decide on what to do differently     ? Try Again!       Communication - When Words Don t Work Anymore      Keys to Success:   -Watch movements & actions   -Watch facial expressions and eye movements   -Listen for changes in volume, frequency, and intensity of sounds or words   -Investigate & Check it out   -Meet the need     It s all about Meeting Needs    -Physical needs   -Emotional needs   -Probable Needs:   -Physical  ?  *Tired   *In pain or uncomfortable   *Thirsty or Hungry   *Need to pee or have a BM or already did & need help   *Too hot or too cold     -Emotional   *Afraid   *Lonely   *Bored   *Angry   *Excited     What Can You Do?   -Figure it out Go thru the list   -Meet the need  Offer help that matches need   -Use visual cues more than verbal cues   -Use touch only after  permission  is given   -Connect - Visually, Verbally, Tactilely   -Protect Yourself & the Person - use Hand Under Hand & Supportive Stance techniques   -Reflect - copy expression/tone, repeat some key words, move with the person   -Engage - LISTEN with your head, your heart, and your body  -Respond - try to meet the unmet needs, offer comfort and connection     ** IF IT DOESN T seem to be working - STOP, BACK OFF - and then TRY AGAIN - changing something in your efforts (visually, verbally, or through touch/physical contact)**             Summary of Recommendations:   Schedule zyprexa 5mg BID  Frame work provided above for interactions with confused patients.       Francisco Javier Ramey PA-C

## 2024-12-18 NOTE — PROVIDER NOTIFICATION
MD Notification    Notified Person: MD    Notified Person Name: Dr. Roman    Notification Date/Time: 12/17/2024 @ 8217    Notification Interaction: [employer] messaging    Purpose of Notification: Patient has been agitated all night, pulled out IV and taking off oxygen. She was given IM Zyprexa and has mitts but she is hitting and punching staff now. Could you please order restraints, at least the wrist restaints?    Orders Received: bilateral wrist restraints ordered    Comments:

## 2024-12-18 NOTE — PROVIDER NOTIFICATION
MD Notification    Notified Person: MD    Notified Person Name: Abel    Notification Date/Time: 12/17/24 10:32 PM    Notification Interaction: [employer] Messaging    Purpose of Notification: Pt getting more agitated and combative. Pt has PRN PO zyprexa but unable to give d/t pt spitting out meds. Can we get an PRN IM? Thanks    Orders Received: Ordered    Comments:

## 2024-12-18 NOTE — PLAN OF CARE
Goal Outcome Evaluation:    Orientation: PETEY. Incoherent speech, agitation  Aggression Stop Light: yellow  Activity: A2 lift. Q2 T/R   Diet/BS Checks: Soft bite size, thin liquids. ACHS. , 15 units given  Tele: N/A  IV Access/Drains: L PIV, SL  Pain Management: Denies pain   Abnormal VS/Results: VSS on 6L oxy mask. Keep sat >92  Bowel/Bladder: Incontinent of B/B. Purewick in place  Skin/Wounds: Blanchable redness on sacrum/coccyx, scattered bruising  Consults: Psych  D/C Disposition: pending  - sitter at bedside  - soft wrist restraints applied, pt previously swinging at and scratching at staff
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
## 2024-12-18 NOTE — PLAN OF CARE
12/17/24       Orientation: Confused, yelling out this morning.  Aggression Stop Light: yellow  Activity: Turn q 2 hrs  Diet/BS Checks: BG ACHS, Pt refused eating today  Tele:  NA  IV Access/Drains: Right PIV  Pain Management: pt unable to rate pain, calling out and moaning at times  Abnormal VS/Results: VSS, O2 at 6 Liters via NC.  Desats to 70's when she tried to remove her oxygen.  With oxygen on today patient desats to 80's with activity.  Bowel/Bladder: Incontinent with bowel/bladder  Skin/Wounds: bruising all over body, blanchable redness coccyx   Consults: Psych  D/C Disposition: pending  Other Info: Pt refusing all medications, yelling obscenities at nursing staff with all cares, trying to remove Nasal Cannula frequently.

## 2024-12-18 NOTE — PLAN OF CARE
1900 - 2300    Orientation: Alert to self, agitated with cares    Aggression Stop Light: Yellow    Activity: A2 w/lift, T/R q 2 hrs    Diet/BS Checks: Soft, bite-sized, BG checks - 118     Tele: N/A    IV Access/Drains: Pt pulled out IV - no IV access    Pain Management: On scheduled tylenol    Abnormal VS/Results: VSS on 6L oxymask    Bowel/Bladder: Incontinent    Skin/Wounds: Unable to complete full skin assessment d/t pt being combative, uncooperative    Consults: SW    D/C Disposition: Pending improvement    Other Info:   - K+, Mg protocols  - Pt agitated, combative with staff - PRN IM zyprexa x1 given

## 2024-12-19 VITALS
TEMPERATURE: 98.5 F | BODY MASS INDEX: 32.58 KG/M2 | SYSTOLIC BLOOD PRESSURE: 165 MMHG | HEIGHT: 65 IN | OXYGEN SATURATION: 92 % | WEIGHT: 195.55 LBS | HEART RATE: 88 BPM | RESPIRATION RATE: 18 BRPM | DIASTOLIC BLOOD PRESSURE: 81 MMHG

## 2024-12-19 LAB
ALBUMIN UR-MCNC: 30 MG/DL
ANION GAP SERPL CALCULATED.3IONS-SCNC: 12 MMOL/L (ref 7–15)
APPEARANCE UR: ABNORMAL
BACTERIA #/AREA URNS HPF: ABNORMAL /HPF
BILIRUB UR QL STRIP: NEGATIVE
BUN SERPL-MCNC: 26.4 MG/DL (ref 8–23)
CALCIUM SERPL-MCNC: 9.5 MG/DL (ref 8.8–10.4)
CHLORIDE SERPL-SCNC: 99 MMOL/L (ref 98–107)
COLOR UR AUTO: YELLOW
CREAT SERPL-MCNC: 0.6 MG/DL (ref 0.51–0.95)
EGFRCR SERPLBLD CKD-EPI 2021: >90 ML/MIN/1.73M2
GLUCOSE BLDC GLUCOMTR-MCNC: 123 MG/DL (ref 70–99)
GLUCOSE BLDC GLUCOMTR-MCNC: 124 MG/DL (ref 70–99)
GLUCOSE BLDC GLUCOMTR-MCNC: 129 MG/DL (ref 70–99)
GLUCOSE BLDC GLUCOMTR-MCNC: 155 MG/DL (ref 70–99)
GLUCOSE SERPL-MCNC: 113 MG/DL (ref 70–99)
GLUCOSE UR STRIP-MCNC: NEGATIVE MG/DL
HCO3 SERPL-SCNC: 35 MMOL/L (ref 22–29)
HGB UR QL STRIP: ABNORMAL
KETONES UR STRIP-MCNC: ABNORMAL MG/DL
LEUKOCYTE ESTERASE UR QL STRIP: ABNORMAL
MAGNESIUM SERPL-MCNC: 1.6 MG/DL (ref 1.7–2.3)
NITRATE UR QL: NEGATIVE
PH UR STRIP: 6.5 [PH] (ref 5–7)
PLATELET # BLD AUTO: 261 10E3/UL (ref 150–450)
POTASSIUM SERPL-SCNC: 4.4 MMOL/L (ref 3.4–5.3)
RBC URINE: 4 /HPF
SODIUM SERPL-SCNC: 146 MMOL/L (ref 135–145)
SP GR UR STRIP: 1.02 (ref 1–1.03)
SQUAMOUS EPITHELIAL: <1 /HPF
UROBILINOGEN UR STRIP-MCNC: NORMAL MG/DL
WBC URINE: 0 /HPF

## 2024-12-19 PROCEDURE — 250N000013 HC RX MED GY IP 250 OP 250 PS 637: Performed by: INTERNAL MEDICINE

## 2024-12-19 PROCEDURE — 99418 PROLNG IP/OBS E/M EA 15 MIN: CPT | Performed by: INTERNAL MEDICINE

## 2024-12-19 PROCEDURE — 250N000011 HC RX IP 250 OP 636: Mod: JW | Performed by: INTERNAL MEDICINE

## 2024-12-19 PROCEDURE — 999N000040 HC STATISTIC CONSULT NO CHARGE VASC ACCESS

## 2024-12-19 PROCEDURE — 250N000011 HC RX IP 250 OP 636: Performed by: INTERNAL MEDICINE

## 2024-12-19 PROCEDURE — 99207 PR NO BILLABLE SERVICE THIS VISIT: CPT

## 2024-12-19 PROCEDURE — 36415 COLL VENOUS BLD VENIPUNCTURE: CPT | Performed by: INTERNAL MEDICINE

## 2024-12-19 PROCEDURE — 81003 URINALYSIS AUTO W/O SCOPE: CPT | Performed by: INTERNAL MEDICINE

## 2024-12-19 PROCEDURE — 99223 1ST HOSP IP/OBS HIGH 75: CPT | Performed by: NURSE PRACTITIONER

## 2024-12-19 PROCEDURE — 80048 BASIC METABOLIC PNL TOTAL CA: CPT | Performed by: INTERNAL MEDICINE

## 2024-12-19 PROCEDURE — 250N000009 HC RX 250: Performed by: STUDENT IN AN ORGANIZED HEALTH CARE EDUCATION/TRAINING PROGRAM

## 2024-12-19 PROCEDURE — 999N000128 HC STATISTIC PERIPHERAL IV START W/O US GUIDANCE

## 2024-12-19 PROCEDURE — 250N000013 HC RX MED GY IP 250 OP 250 PS 637: Performed by: PHYSICIAN ASSISTANT

## 2024-12-19 PROCEDURE — 87106 FUNGI IDENTIFICATION YEAST: CPT | Performed by: INTERNAL MEDICINE

## 2024-12-19 PROCEDURE — 250N000011 HC RX IP 250 OP 636

## 2024-12-19 PROCEDURE — 83735 ASSAY OF MAGNESIUM: CPT | Performed by: INTERNAL MEDICINE

## 2024-12-19 PROCEDURE — 120N000001 HC R&B MED SURG/OB

## 2024-12-19 PROCEDURE — 250N000013 HC RX MED GY IP 250 OP 250 PS 637: Performed by: STUDENT IN AN ORGANIZED HEALTH CARE EDUCATION/TRAINING PROGRAM

## 2024-12-19 PROCEDURE — 85049 AUTOMATED PLATELET COUNT: CPT | Performed by: INTERNAL MEDICINE

## 2024-12-19 PROCEDURE — 99233 SBSQ HOSP IP/OBS HIGH 50: CPT | Performed by: INTERNAL MEDICINE

## 2024-12-19 RX ORDER — WATER 10 ML/10ML
INJECTION INTRAMUSCULAR; INTRAVENOUS; SUBCUTANEOUS
Status: COMPLETED
Start: 2024-12-19 | End: 2024-12-19

## 2024-12-19 RX ORDER — FUROSEMIDE 10 MG/ML
40 INJECTION INTRAMUSCULAR; INTRAVENOUS DAILY
Status: DISCONTINUED | OUTPATIENT
Start: 2024-12-20 | End: 2024-12-21

## 2024-12-19 RX ORDER — OLANZAPINE 10 MG/2ML
5 INJECTION, POWDER, FOR SOLUTION INTRAMUSCULAR ONCE
Status: COMPLETED | OUTPATIENT
Start: 2024-12-19 | End: 2024-12-19

## 2024-12-19 RX ADMIN — WATER 10 ML: 1 INJECTION, SOLUTION INTRAMUSCULAR; INTRAVENOUS; SUBCUTANEOUS at 17:04

## 2024-12-19 RX ADMIN — INSULIN GLARGINE 15 UNITS: 100 INJECTION, SOLUTION SUBCUTANEOUS at 23:51

## 2024-12-19 RX ADMIN — ALLOPURINOL 200 MG: 100 TABLET ORAL at 08:33

## 2024-12-19 RX ADMIN — OLANZAPINE 5 MG: 10 INJECTION, POWDER, FOR SOLUTION INTRAMUSCULAR at 17:04

## 2024-12-19 RX ADMIN — HEPARIN SODIUM 5000 UNITS: 5000 INJECTION, SOLUTION INTRAVENOUS; SUBCUTANEOUS at 09:56

## 2024-12-19 RX ADMIN — SERTRALINE HYDROCHLORIDE 100 MG: 100 TABLET ORAL at 08:34

## 2024-12-19 RX ADMIN — METOPROLOL TARTRATE 50 MG: 50 TABLET, FILM COATED ORAL at 08:34

## 2024-12-19 RX ADMIN — PSYLLIUM HUSK 1 PACKET: 3.4 POWDER ORAL at 09:56

## 2024-12-19 RX ADMIN — LIDOCAINE 1 PATCH: 4 PATCH TOPICAL at 09:55

## 2024-12-19 RX ADMIN — LOSARTAN POTASSIUM 50 MG: 50 TABLET, FILM COATED ORAL at 08:33

## 2024-12-19 RX ADMIN — HEPARIN SODIUM 5000 UNITS: 5000 INJECTION, SOLUTION INTRAVENOUS; SUBCUTANEOUS at 03:38

## 2024-12-19 RX ADMIN — HYDROMORPHONE HYDROCHLORIDE 0.2 MG: 0.2 INJECTION, SOLUTION INTRAMUSCULAR; INTRAVENOUS; SUBCUTANEOUS at 14:13

## 2024-12-19 RX ADMIN — POLYETHYLENE GLYCOL 3350 17 G: 17 POWDER, FOR SOLUTION ORAL at 08:32

## 2024-12-19 RX ADMIN — BISACODYL 10 MG: 10 SUPPOSITORY RECTAL at 17:04

## 2024-12-19 RX ADMIN — HYDROMORPHONE HYDROCHLORIDE 0.2 MG: 0.2 INJECTION, SOLUTION INTRAMUSCULAR; INTRAVENOUS; SUBCUTANEOUS at 21:22

## 2024-12-19 RX ADMIN — FUROSEMIDE 40 MG: 10 INJECTION, SOLUTION INTRAMUSCULAR; INTRAVENOUS at 04:17

## 2024-12-19 RX ADMIN — OLANZAPINE 2.5 MG: 10 INJECTION, POWDER, FOR SOLUTION INTRAMUSCULAR at 09:46

## 2024-12-19 RX ADMIN — OLANZAPINE 5 MG: 5 TABLET, ORALLY DISINTEGRATING ORAL at 08:33

## 2024-12-19 RX ADMIN — SENNOSIDES AND DOCUSATE SODIUM 2 TABLET: 50; 8.6 TABLET ORAL at 08:33

## 2024-12-19 RX ADMIN — WATER 10 ML: 1 INJECTION, SOLUTION INTRAMUSCULAR; INTRAVENOUS; SUBCUTANEOUS at 09:46

## 2024-12-19 RX ADMIN — AMLODIPINE BESYLATE 10 MG: 10 TABLET ORAL at 08:33

## 2024-12-19 RX ADMIN — CLOPIDOGREL BISULFATE 75 MG: 75 TABLET ORAL at 08:33

## 2024-12-19 RX ADMIN — ACETAMINOPHEN 1000 MG: 500 TABLET, FILM COATED ORAL at 08:32

## 2024-12-19 RX ADMIN — HEPARIN SODIUM 5000 UNITS: 5000 INJECTION, SOLUTION INTRAVENOUS; SUBCUTANEOUS at 23:51

## 2024-12-19 ASSESSMENT — ACTIVITIES OF DAILY LIVING (ADL)
ADLS_ACUITY_SCORE: 91
ADLS_ACUITY_SCORE: 93
ADLS_ACUITY_SCORE: 91
ADLS_ACUITY_SCORE: 93
ADLS_ACUITY_SCORE: 91
ADLS_ACUITY_SCORE: 93
ADLS_ACUITY_SCORE: 91

## 2024-12-19 NOTE — PLAN OF CARE
Orientation: SELF ONLY; agitated at times   Aggression Stop Light: yellow  Activity: A2 lift. Q2 T/R   Diet/BS Checks: Soft bite size, thin liquids. ACHS. , 129  Tele: N/A  IV Access/Drains: RPIV, SL  Pain Management: Denies pain. PRN dilaudid available  Abnormal VS/Results: VSS weaned to 1L oxy mask.   Bowel/Bladder: Incontinent of B/B. Purewick in place. No BM since 12/10. Dulcolax ordered.   Skin/Wounds: Blanchable redness on sacrum/coccyx, scattered bruising  Consults: Psych  D/C Disposition: pending O2 status. Back to LTC ([facility])
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
## 2024-12-19 NOTE — PROGRESS NOTES
Care Management Follow Up    Length of Stay (days): 12    Expected Discharge Date: 12/21/2024     Concerns to be Addressed: discharge planning  Return to [facility]  Patient plan of care discussed at interdisciplinary rounds: Yes    Anticipated Discharge Disposition: Long Term Care  Disposition Comments: Return to Long Term care           Anticipated Discharge Services: None  Anticipated Discharge DME: None    Patient/family educated on Medicare website which has current facility and service quality ratings: no  Education Provided on the Discharge Plan: No  Patient/Family in Agreement with the Plan: yes    Referrals Placed by CM/SW: Post Acute Facilities  Private pay costs discussed: Not applicable    Discussed  Partnership in Safe Discharge Planning  document with patient/family: No     Handoff Completed: No, handoff not indicated or clinically appropriate    Additional Information:  Message was sent to Dr. Hickman on whether a palliative consult would be appropriate for patient.      Addendum 1217    Dr. Hickman added a consult for palliative for patient.     Next Steps: palliative consult update    Justin DSOUZA  [facility]  Inpatient Care Coordination

## 2024-12-19 NOTE — CODE/RAPID RESPONSE
[facility]    RRT Note:  CODE 21/Restraint check  12/19/2024   Time Called: 1642    RRT called for: Code 21    INTERVENTIONS:  -5 mg IM Zyprexa  -Bilateral soft wrist restraints continued      Discussed with and defer further cares to Hospitalist Dr. Emily Hickman    Interval History     Jessika Garcia is a 74 year old female who was admitted on 12/7/2024 for acute on chronic heart failure and altered mental status. Since being admitted, she has had significant behavioral changes. Psychiatry was consulted and added Zyprexa to her regimen.   On 12/19/2024 at approximately 1640, Akila Garcia became agitated and began trying to hit and grab staff while they were providing cares, prompting the RN to call a code 21.  Upon my arrival, Ms. Garcia was being turned in the bed and was screaming profanity staff while attempting to to claw and hit. I began by calmly introducing myself and asking how I could help. She responded with a series of profane remarks and continued to hit and grab at staff. 5 mg of IM Zyprexa was ordered and given and she quickly calmed down. She has been in bilateral soft wrist restraints for most of the day. These will remain in place only as long as she is a danger to herself and/or others. Plan was discussed with nursing staff as well as the attending hospitalist.          Medical history significant for: HFpEF, HTN, CVA w/ L side deficits/ataxia/impaired mobility (wheelchair bound), asthma, T2DM on insulin, gout, HLD     Code Status: No CPR- Do NOT Intubate    Daryl Bill NP    Allergies   No Known Allergies    Physical Exam   Vital Signs with Ranges:  Temp:  [98.5  F (36.9  C)] 98.5  F (36.9  C)  Pulse:  [58-88] 88  Resp:  [16-18] 18  BP: (137-165)/() 165/81  SpO2:  [91 %-96 %] 92 %  I/O last 3 completed shifts:  In: 360 [P.O.:360]  Out: 1150 [Urine:1150]  Physical Exam  Skin:     General: Skin is warm and dry.   Neurological:      Mental Status: She is disoriented  "and confused.   Psychiatric:         Mood and Affect: Affect is angry.         Behavior: Behavior is aggressive.         Thought Content: Thought content is paranoid.         Cognition and Memory: Cognition is impaired.         Judgment: Judgment is impulsive and inappropriate.     Data     IMAGING: (X-ray/CT/MRI)   No results found for this or any previous visit (from the past 24 hours).    CBC with Diff:  Recent Labs   Lab Test 12/19/24  0908 12/16/24  1026 12/15/24  1052   WBC  --   --  7.1   HGB  --   --  9.8*   MCV  --   --  96      < > 288    < > = values in this interval not displayed.      No results found for: \"RETICABSCT\"  No results found for: \"RETP\"    Lactic Acid:    Lab Results   Component Value Date    LACT 1.2 12/11/2024           Comprehensive Metabolic Panel:  Recent Labs   Lab 12/19/24  1245 12/19/24  0908   NA  --  146*   POTASSIUM  --  4.4   CHLORIDE  --  99   CO2  --  35*   ANIONGAP  --  12   * 113*   BUN  --  26.4*   CR  --  0.60   GFRESTIMATED  --  >90   DOMENICA  --  9.5   MAG  --  1.6*       UA:  No results for input(s): \"COLOR\", \"APPEARANCE\", \"URINEGLC\", \"URINEBILI\", \"URINEKETONE\", \"SG\", \"UBLD\", \"URINEPH\", \"PROTEIN\", \"UROBILINOGEN\", \"NITRITE\", \"LEUKEST\", \"RBCU\", \"WBCU\" in the last 168 hours.      Time Spent on this Encounter   I spent 20 minutes on the unit/floor managing the care of Jessika Garcia. Over 50% of my time was spent counseling the patient and/or coordinating care regarding services listed in this note.     "

## 2024-12-19 NOTE — CONSULTS
Palliative Care Consultation Note  [facility]      Patient: Jessika Garcia  Date of Admission:  12/7/2024    Requesting Clinician / Team: fredis  Reason for consult:   Goals of care     Recommendations & Counseling     GOALS OF CARE:   Life-prolonging with limits   No escalation of care beyond current.  No transfer to the ICU.  No high flow/BiPAP.  No enteral feeding.  Jessika and Rosalia are not interested in hospice. It is unclear if Jessika has a qualifying hospice diagnosis. However, given her overall clinical status would recommend a formal evaluation by a hospice agency in the coming days if no improvement.  Shared the concern that we will be limited and what we can do for her given her mentation and if she does not improve, we may need to consider de-escalating further treatments.     ADVANCE CARE PLANNING:  No health care directive on file. Per system policy, Surrogate Decision-makers for Patients With Diminished Decision-making Capacity offers guidance on possible decision-makers.   There is no POLST form on file, defer to patient and/or next of kin for decisions   Code status: No CPR- Do NOT Intubate    MEDICAL MANAGEMENT:   We are not actively managing symptoms at this time.    PSYCHOSOCIAL/SPIRITUAL SUPPORT:  Family   [place] community: [facility]     Palliative Care will continue to follow. Thank you for the consult and allowing us to aid in the care of Jessika Garcia.    These recommendations have been discussed with bedside and primary team.    Joselito Taveras NP  [facility], Palliative Care  Securely message with the Vocera Web Console (learn more here) or  Text page via [facility] Paging/Directory     Chart documentation was completed, in part, with [employer] voice-recognition software. Even though reviewed, some grammatical, spelling, and word errors may remain.    Assessment      Jessika Garcia is a 74 year old female with history of HFpEF, HTN, CVA w/ L side deficits/ataxia/impaired mobility  (wheelchair bound), asthma, T2DM on insulin, gout, HLD who presented from her long-term care facility with worsening dyspnea.  She was found to have an acute heart failure exacerbation as well as a possible healthcare associated pneumonia.  Her course has been complicated by worsening toxic metabolic encephalopathy.  She continually refuses care and becomes agitated and combative with routine medical care.  She currently requires a one-to-one attendant and bilateral soft wrist restraints.  Palliative care service was consulted for goals of care support.         History of Present Illness   Met with Jessika and spoke with her Sister Rosalia over the phone.   Introduced the role of palliative care as an interdisciplinary team that cares for patients with serious illness to help support symptom management, communication, coping for patients and their families as well as support with medical decision making.    Prognosis, Goals, & Planning:   Functional Status just prior to this current hospitalization:  Outpatient Palliative Performance Score (PPS) 30%  Extensive disease. Bedbound & requires total care, normal or reduced intake. Normal LOC or drowsy or confused.   Patient has been residing at the LT since July of 2022. Patient is typically requiring an assist of 1 with lavonne lift at the facility. She uses a wheelchair and requires staff assist with pushing as she cannot self propel. Staff assist with all I/ADL's.     Prognosis, Goals, and/or Advance Care Planning:  Rosalia shared that Jessika becomes more aggressive and agitated when hospitalized.  Rosalia shared that she is always somewhat stubborn and feisty however is not typically angry or agitated.  She typically does not swear nor is aggressive towards staff.   Education provided on hospital-acquired delirium and subsequent treatments.  Discussed what continuing restorative/life-prolonging care entails, including continued (re)admissions to the hospital, continuing  with preventative and primary care, seeing disease/organ-specific specialties for medical treatment in hopes to prolong life for as long as possible.  Shared that with any treatment, there will be risks and benefits that may affect overall quality of life. Education provided on the role of palliative care while continuing with disease treatments (support/coping, symptom management).  We discussed that if/when disease treatments are no longer effective or when their quality of life is too negatively impacted, patient may elect to focus more solely on quality-of-life measures and stop disease treatment.      Education provided on transition to comfort-focused goals of care  including discontinuation of interventions that do not directly promote comfort.  Discussed that this process is very purposeful in terms of ensuring patient is as comfortable as possible and that family wishes are honored.  Education provided regarding hospice philosophy, prognostic,and eligibility criteria.Neither Rosalia nor Jessika would like hospice at this time.     Code Status was addressed today:   Yes, reaffirmed DNR/DNI status          Coping, Meaning, & Spirituality:   Mood, coping, and/or meaning in the context of serious illness were addressed today: Yes    Social:   Living situation:resides in a nursing home [place]   Important relationships/caregivers: Sister Rosalia     Medications:  Reviewed this patient's medication profile and medications from this hospitalization.   [place] Board of Pharmacy Data Base Reviewed: No    ROS:  Comprehensive ROS is reviewed and is negative except as here & per HPI:     Physical Exam   Vital Signs with Ranges  Temp:  [98.2  F (36.8  C)-98.5  F (36.9  C)] 98.5  F (36.9  C)  Pulse:  [55-67] 67  Resp:  [16-18] 18  BP: (130-138)/() 138/114  SpO2:  [91 %-96 %] 92 %  Wt Readings from Last 10 Encounters:   12/15/24 88.7 kg (195 lb 8.8 oz)     195 lbs 8.77 oz    Physical Exam  Vitals and nursing note  reviewed.   HENT:      Head: Normocephalic.      Mouth/Throat:      Mouth: Mucous membranes are dry.      Pharynx: Oropharynx is clear.   Eyes:      Extraocular Movements: Extraocular movements intact.      Conjunctiva/sclera: Conjunctivae normal.      Pupils: Pupils are equal, round, and reactive to light.   Cardiovascular:      Rate and Rhythm: Normal rate and regular rhythm.      Pulses: Normal pulses.   Pulmonary:      Effort: Pulmonary effort is normal. No respiratory distress.      Breath sounds: No wheezing.   Abdominal:      General: Abdomen is flat. There is no distension.      Tenderness: There is no abdominal tenderness.   Musculoskeletal:         General: Normal range of motion.   Skin:     General: Skin is warm and dry.      Capillary Refill: Capillary refill takes less than 2 seconds.   Neurological:      Mental Status: She is alert. She is disoriented.      Cranial Nerves: No cranial nerve deficit.   Psychiatric:         Mood and Affect: Affect is angry and inappropriate.         Behavior: Behavior is agitated and aggressive.         Thought Content: Thought content is paranoid.         Cognition and Memory: Cognition is impaired.           Data reviewed:  Results for orders placed or performed during the hospital encounter of 12/07/24 (from the past 24 hours)   Glucose by meter   Result Value Ref Range    GLUCOSE BY METER POCT 113 (H) 70 - 99 mg/dL   Glucose by meter   Result Value Ref Range    GLUCOSE BY METER POCT 126 (H) 70 - 99 mg/dL   Glucose by meter   Result Value Ref Range    GLUCOSE BY METER POCT 123 (H) 70 - 99 mg/dL   Glucose by meter   Result Value Ref Range    GLUCOSE BY METER POCT 124 (H) 70 - 99 mg/dL   Platelet count   Result Value Ref Range    Platelet Count 261 150 - 450 10e3/uL   Magnesium   Result Value Ref Range    Magnesium 1.6 (L) 1.7 - 2.3 mg/dL   Basic metabolic panel   Result Value Ref Range    Sodium 146 (H) 135 - 145 mmol/L    Potassium 4.4 3.4 - 5.3 mmol/L    Chloride 99  98 - 107 mmol/L    Carbon Dioxide (CO2) 35 (H) 22 - 29 mmol/L    Anion Gap 12 7 - 15 mmol/L    Urea Nitrogen 26.4 (H) 8.0 - 23.0 mg/dL    Creatinine 0.60 0.51 - 0.95 mg/dL    GFR Estimate >90 >60 mL/min/1.73m2    Calcium 9.5 8.8 - 10.4 mg/dL    Glucose 113 (H) 70 - 99 mg/dL   Glucose by meter   Result Value Ref Range    GLUCOSE BY METER POCT 129 (H) 70 - 99 mg/dL       Medical Decision Making       84 MINUTES SPENT BY ME on the date of service doing chart review, history, exam, documentation & further activities per the note.

## 2024-12-19 NOTE — PLAN OF CARE
Goal Outcome Evaluation:     Orientation: SELF ONLY; agitated at times   Aggression Stop Light: yellow  Activity: A2 lift. Q2 T/R   Diet/BS Checks: Soft bite size, thin liquids. ACHS. BG 92, 113.   Tele: N/A  IV Access/Drains: L PIV, SL  Pain Management: non-verbal pain indicators present. Pt also reports abdominal pain upon palpation. PRN dilaudid given 2x.   Abnormal VS/Results: VSS on 2L oxy mask. Keep sat >92  Bowel/Bladder: Incontinent of B/B. Purewick in place. No BM since 12/10. Dulcolax ordered.   Skin/Wounds: Blanchable redness on sacrum/coccyx, scattered bruising  Consults: Psych  D/C Disposition: pending  - sitter at bedside  - restraints DC  - Pt able to communicate needs calmly once pain managed

## 2024-12-20 LAB
ANION GAP SERPL CALCULATED.3IONS-SCNC: 10 MMOL/L (ref 7–15)
BUN SERPL-MCNC: 19 MG/DL (ref 8–23)
CALCIUM SERPL-MCNC: 9.4 MG/DL (ref 8.8–10.4)
CHLORIDE SERPL-SCNC: 96 MMOL/L (ref 98–107)
CREAT SERPL-MCNC: 0.55 MG/DL (ref 0.51–0.95)
EGFRCR SERPLBLD CKD-EPI 2021: >90 ML/MIN/1.73M2
ERYTHROCYTE [DISTWIDTH] IN BLOOD BY AUTOMATED COUNT: 15.5 % (ref 10–15)
GLUCOSE BLDC GLUCOMTR-MCNC: 165 MG/DL (ref 70–99)
GLUCOSE BLDC GLUCOMTR-MCNC: 172 MG/DL (ref 70–99)
GLUCOSE BLDC GLUCOMTR-MCNC: 175 MG/DL (ref 70–99)
GLUCOSE SERPL-MCNC: 201 MG/DL (ref 70–99)
HCO3 SERPL-SCNC: 35 MMOL/L (ref 22–29)
HCT VFR BLD AUTO: 35.4 % (ref 35–47)
HGB BLD-MCNC: 11 G/DL (ref 11.7–15.7)
MAGNESIUM SERPL-MCNC: 1.6 MG/DL (ref 1.7–2.3)
MCH RBC QN AUTO: 29.6 PG (ref 26.5–33)
MCHC RBC AUTO-ENTMCNC: 31.1 G/DL (ref 31.5–36.5)
MCV RBC AUTO: 95 FL (ref 78–100)
PLATELET # BLD AUTO: 244 10E3/UL (ref 150–450)
POTASSIUM SERPL-SCNC: 4 MMOL/L (ref 3.4–5.3)
RBC # BLD AUTO: 3.72 10E6/UL (ref 3.8–5.2)
SODIUM SERPL-SCNC: 141 MMOL/L (ref 135–145)
WBC # BLD AUTO: 9.3 10E3/UL (ref 4–11)

## 2024-12-20 PROCEDURE — 250N000013 HC RX MED GY IP 250 OP 250 PS 637: Performed by: INTERNAL MEDICINE

## 2024-12-20 PROCEDURE — 250N000009 HC RX 250: Performed by: STUDENT IN AN ORGANIZED HEALTH CARE EDUCATION/TRAINING PROGRAM

## 2024-12-20 PROCEDURE — 83735 ASSAY OF MAGNESIUM: CPT | Performed by: INTERNAL MEDICINE

## 2024-12-20 PROCEDURE — 250N000013 HC RX MED GY IP 250 OP 250 PS 637: Performed by: STUDENT IN AN ORGANIZED HEALTH CARE EDUCATION/TRAINING PROGRAM

## 2024-12-20 PROCEDURE — 92526 ORAL FUNCTION THERAPY: CPT | Mod: GN

## 2024-12-20 PROCEDURE — 250N000011 HC RX IP 250 OP 636: Performed by: PSYCHIATRY & NEUROLOGY

## 2024-12-20 PROCEDURE — 250N000013 HC RX MED GY IP 250 OP 250 PS 637: Performed by: PHYSICIAN ASSISTANT

## 2024-12-20 PROCEDURE — 85014 HEMATOCRIT: CPT | Performed by: INTERNAL MEDICINE

## 2024-12-20 PROCEDURE — 36415 COLL VENOUS BLD VENIPUNCTURE: CPT | Performed by: INTERNAL MEDICINE

## 2024-12-20 PROCEDURE — 120N000001 HC R&B MED SURG/OB

## 2024-12-20 PROCEDURE — 99207 PR NO BILLABLE SERVICE THIS VISIT: CPT | Performed by: NURSE PRACTITIONER

## 2024-12-20 PROCEDURE — 250N000011 HC RX IP 250 OP 636: Performed by: INTERNAL MEDICINE

## 2024-12-20 PROCEDURE — 250N000013 HC RX MED GY IP 250 OP 250 PS 637: Performed by: PSYCHIATRY & NEUROLOGY

## 2024-12-20 PROCEDURE — 99233 SBSQ HOSP IP/OBS HIGH 50: CPT | Performed by: INTERNAL MEDICINE

## 2024-12-20 PROCEDURE — 80048 BASIC METABOLIC PNL TOTAL CA: CPT | Performed by: INTERNAL MEDICINE

## 2024-12-20 PROCEDURE — 99232 SBSQ HOSP IP/OBS MODERATE 35: CPT | Performed by: PSYCHIATRY & NEUROLOGY

## 2024-12-20 RX ORDER — OLANZAPINE 5 MG/1
5 TABLET, ORALLY DISINTEGRATING ORAL 4 TIMES DAILY PRN
Status: DISCONTINUED | OUTPATIENT
Start: 2024-12-20 | End: 2024-12-26 | Stop reason: HOSPADM

## 2024-12-20 RX ORDER — OLANZAPINE 10 MG/2ML
5 INJECTION, POWDER, FOR SOLUTION INTRAMUSCULAR 4 TIMES DAILY PRN
Status: DISCONTINUED | OUTPATIENT
Start: 2024-12-20 | End: 2024-12-26 | Stop reason: HOSPADM

## 2024-12-20 RX ORDER — OLANZAPINE 10 MG/2ML
5 INJECTION, POWDER, FOR SOLUTION INTRAMUSCULAR 4 TIMES DAILY PRN
Status: DISCONTINUED | OUTPATIENT
Start: 2024-12-20 | End: 2024-12-20

## 2024-12-20 RX ORDER — WATER 10 ML/10ML
INJECTION INTRAMUSCULAR; INTRAVENOUS; SUBCUTANEOUS
Status: COMPLETED
Start: 2024-12-20 | End: 2024-12-20

## 2024-12-20 RX ORDER — OLANZAPINE 5 MG/1
5 TABLET, ORALLY DISINTEGRATING ORAL 4 TIMES DAILY
Status: DISCONTINUED | OUTPATIENT
Start: 2024-12-20 | End: 2024-12-26 | Stop reason: HOSPADM

## 2024-12-20 RX ADMIN — LOSARTAN POTASSIUM 50 MG: 50 TABLET, FILM COATED ORAL at 09:35

## 2024-12-20 RX ADMIN — HEPARIN SODIUM 5000 UNITS: 5000 INJECTION, SOLUTION INTRAVENOUS; SUBCUTANEOUS at 23:34

## 2024-12-20 RX ADMIN — HEPARIN SODIUM 5000 UNITS: 5000 INJECTION, SOLUTION INTRAVENOUS; SUBCUTANEOUS at 16:25

## 2024-12-20 RX ADMIN — HYDROMORPHONE HYDROCHLORIDE 0.2 MG: 0.2 INJECTION, SOLUTION INTRAMUSCULAR; INTRAVENOUS; SUBCUTANEOUS at 02:31

## 2024-12-20 RX ADMIN — INSULIN GLARGINE 15 UNITS: 100 INJECTION, SOLUTION SUBCUTANEOUS at 23:33

## 2024-12-20 RX ADMIN — SERTRALINE HYDROCHLORIDE 100 MG: 100 TABLET ORAL at 09:35

## 2024-12-20 RX ADMIN — CYANOCOBALAMIN 1000 MCG: 1000 INJECTION, SOLUTION INTRAMUSCULAR; SUBCUTANEOUS at 16:25

## 2024-12-20 RX ADMIN — HYDROMORPHONE HYDROCHLORIDE 0.2 MG: 0.2 INJECTION, SOLUTION INTRAMUSCULAR; INTRAVENOUS; SUBCUTANEOUS at 18:00

## 2024-12-20 RX ADMIN — CLOPIDOGREL BISULFATE 75 MG: 75 TABLET ORAL at 09:35

## 2024-12-20 RX ADMIN — ALLOPURINOL 200 MG: 100 TABLET ORAL at 09:35

## 2024-12-20 RX ADMIN — OLANZAPINE 5 MG: 5 TABLET, ORALLY DISINTEGRATING ORAL at 12:56

## 2024-12-20 RX ADMIN — OLANZAPINE 5 MG: 10 INJECTION, POWDER, FOR SOLUTION INTRAMUSCULAR at 10:51

## 2024-12-20 RX ADMIN — HEPARIN SODIUM 5000 UNITS: 5000 INJECTION, SOLUTION INTRAVENOUS; SUBCUTANEOUS at 09:36

## 2024-12-20 RX ADMIN — LIDOCAINE 1 PATCH: 4 PATCH TOPICAL at 09:36

## 2024-12-20 RX ADMIN — FUROSEMIDE 40 MG: 10 INJECTION, SOLUTION INTRAMUSCULAR; INTRAVENOUS at 09:36

## 2024-12-20 RX ADMIN — PSYLLIUM HUSK 1 PACKET: 3.4 POWDER ORAL at 09:38

## 2024-12-20 RX ADMIN — AMLODIPINE BESYLATE 10 MG: 10 TABLET ORAL at 09:35

## 2024-12-20 RX ADMIN — ACETAMINOPHEN 1000 MG: 500 TABLET, FILM COATED ORAL at 16:24

## 2024-12-20 RX ADMIN — WATER 10 ML: 1 INJECTION, SOLUTION INTRAMUSCULAR; INTRAVENOUS; SUBCUTANEOUS at 10:51

## 2024-12-20 RX ADMIN — ACETAMINOPHEN 1000 MG: 500 TABLET, FILM COATED ORAL at 09:35

## 2024-12-20 RX ADMIN — METOPROLOL TARTRATE 50 MG: 50 TABLET, FILM COATED ORAL at 09:35

## 2024-12-20 RX ADMIN — OLANZAPINE 5 MG: 5 TABLET, ORALLY DISINTEGRATING ORAL at 18:00

## 2024-12-20 ASSESSMENT — ACTIVITIES OF DAILY LIVING (ADL)
ADLS_ACUITY_SCORE: 98
ADLS_ACUITY_SCORE: 92
ADLS_ACUITY_SCORE: 98
ADLS_ACUITY_SCORE: 91
ADLS_ACUITY_SCORE: 97
ADLS_ACUITY_SCORE: 91
ADLS_ACUITY_SCORE: 97
ADLS_ACUITY_SCORE: 98
ADLS_ACUITY_SCORE: 98
ADLS_ACUITY_SCORE: 92
ADLS_ACUITY_SCORE: 97
ADLS_ACUITY_SCORE: 91
ADLS_ACUITY_SCORE: 97
ADLS_ACUITY_SCORE: 91
ADLS_ACUITY_SCORE: 97
ADLS_ACUITY_SCORE: 97
ADLS_ACUITY_SCORE: 91
ADLS_ACUITY_SCORE: 97
ADLS_ACUITY_SCORE: 98
ADLS_ACUITY_SCORE: 91
ADLS_ACUITY_SCORE: 97

## 2024-12-20 NOTE — PROGRESS NOTES
[facility]  Palliative Care Chart Check Note    Jessika Garcia is a 74 year old female with history of HFpEF, HTN, CVA w/ L side deficits/ataxia/impaired mobility (wheelchair bound), asthma, T2DM on insulin, gout, HLD who presented from her long-term care facility with worsening dyspnea.  She was found to have an acute heart failure exacerbation as well as a possible healthcare associated pneumonia.  Her course has been complicated by worsening toxic metabolic encephalopathy.  She continually refuses care and becomes agitated and combative with routine medical care.  She currently requires a one-to-one attendant and bilateral soft wrist restraints.  Palliative care service was consulted for goals of care support.     GOALS OF CARE:   Life-prolonging with limits   No escalation of care beyond current.  No transfer to the ICU.  No high flow/BiPAP.  No enteral feeding.  Jessika and Rosalia are not interested in hospice. It is unclear if Jessika has a qualifying hospice diagnosis. However, given her overall clinical status would recommend a formal evaluation by a hospice agency in the coming days if no improvement.  Shared the concern that we will be limited and what we can do for her given her mentation and if she does not improve, we may need to consider de-escalating further treatments.      ADVANCE CARE PLANNING:  No health care directive on file. Per system policy, Surrogate Decision-makers for Patients With Diminished Decision-making Capacity offers guidance on possible decision-makers.   There is no POLST form on file, defer to patient and/or next of kin for decisions   Code status: No CPR- Do NOT Intubate     MEDICAL MANAGEMENT:   We are not actively managing symptoms at this time.     PSYCHOSOCIAL/SPIRITUAL SUPPORT:  Family   [place] community: [facility]      Palliative Care will continue to follow peripherally. We will not formally see the patient on a routine basis.. If needs arise, please  contact us via [employer] or [employer]  as needed.     Joselito Taveras NP  [facility], Palliative Care  Securely message with the [employer] Web Console (learn more here) or  Text page via [employer] Paging/Directory
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
## 2024-12-20 NOTE — PLAN OF CARE
Goal Outcome Evaluation:        Orientation: SELF ONLY; agitated with cares.   Aggression Stop Light: yellow  Activity: A2 lift. Q2 T/R   Diet/BS Checks: Soft bite size, thin liquids. Refused. ACHS. , 129  Tele: N/A  IV Access/Drains: Loss PIV access, provider aware.   Pain Management: PRN Dilaudid given 1x for nonverbal pain indicators.   Abnormal VS/Results: VSS on 3L Oxymask.   Bowel/Bladder: Incontinent of B/B. Purewick in place.  No BM since 12/10- suppository given. 1 Large BM this evening.   Skin/Wounds: Blanchable redness on sacrum/coccyx- mepi replaced, scattered bruising, scabs on forearms.   Consults: Psych  D/C Disposition: pending O2 status. Back to LTC ([facility])  -Pt agitated with cares and nursing presence. IM zyprexa given 2x for increased agitation. Pt made several threatening statements to staff. Pt made several attempts to punch, kick, scratch, and spit on staff during cares.
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
## 2024-12-20 NOTE — PROGRESS NOTES
Spoke with Kenyatta Antoine  Bing and she let me know that Jessika's baseline is moderate to sever confusion. She does get agitated and will swear, refuse things (like meals). She has not yelled, or been combative at baseline. She does tend to repeat herself. She is not very social and likes to spend her time in her room watching TV.
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
## 2024-12-20 NOTE — PROGRESS NOTES
MD Notification    Notified Person: MD    Notified Person Name: Emily Hickman MD     Notification Date/Time: 12/19 @ 1603    Notification Interaction: Vocera     Purpose of Notification: Restraints and PRN     Orders Received: Ordered restraints     Comments:

## 2024-12-21 LAB
GLUCOSE BLDC GLUCOMTR-MCNC: 115 MG/DL (ref 70–99)
GLUCOSE BLDC GLUCOMTR-MCNC: 123 MG/DL (ref 70–99)
GLUCOSE BLDC GLUCOMTR-MCNC: 127 MG/DL (ref 70–99)
GLUCOSE BLDC GLUCOMTR-MCNC: 144 MG/DL (ref 70–99)
HOLD SPECIMEN: NORMAL
MAGNESIUM SERPL-MCNC: 1.6 MG/DL (ref 1.7–2.3)
POTASSIUM SERPL-SCNC: 3.9 MMOL/L (ref 3.4–5.3)

## 2024-12-21 PROCEDURE — 250N000013 HC RX MED GY IP 250 OP 250 PS 637: Performed by: INTERNAL MEDICINE

## 2024-12-21 PROCEDURE — 250N000011 HC RX IP 250 OP 636: Performed by: INTERNAL MEDICINE

## 2024-12-21 PROCEDURE — 84132 ASSAY OF SERUM POTASSIUM: CPT | Performed by: INTERNAL MEDICINE

## 2024-12-21 PROCEDURE — 250N000011 HC RX IP 250 OP 636: Performed by: STUDENT IN AN ORGANIZED HEALTH CARE EDUCATION/TRAINING PROGRAM

## 2024-12-21 PROCEDURE — 36415 COLL VENOUS BLD VENIPUNCTURE: CPT | Performed by: INTERNAL MEDICINE

## 2024-12-21 PROCEDURE — 250N000011 HC RX IP 250 OP 636: Performed by: PSYCHIATRY & NEUROLOGY

## 2024-12-21 PROCEDURE — 83735 ASSAY OF MAGNESIUM: CPT | Performed by: INTERNAL MEDICINE

## 2024-12-21 PROCEDURE — 120N000001 HC R&B MED SURG/OB

## 2024-12-21 PROCEDURE — 250N000013 HC RX MED GY IP 250 OP 250 PS 637: Performed by: STUDENT IN AN ORGANIZED HEALTH CARE EDUCATION/TRAINING PROGRAM

## 2024-12-21 PROCEDURE — 250N000009 HC RX 250: Performed by: STUDENT IN AN ORGANIZED HEALTH CARE EDUCATION/TRAINING PROGRAM

## 2024-12-21 PROCEDURE — 99233 SBSQ HOSP IP/OBS HIGH 50: CPT | Performed by: STUDENT IN AN ORGANIZED HEALTH CARE EDUCATION/TRAINING PROGRAM

## 2024-12-21 PROCEDURE — 250N000013 HC RX MED GY IP 250 OP 250 PS 637: Performed by: PSYCHIATRY & NEUROLOGY

## 2024-12-21 RX ORDER — WATER 10 ML/10ML
INJECTION INTRAMUSCULAR; INTRAVENOUS; SUBCUTANEOUS
Status: COMPLETED
Start: 2024-12-21 | End: 2024-12-21

## 2024-12-21 RX ORDER — ENOXAPARIN SODIUM 100 MG/ML
40 INJECTION SUBCUTANEOUS EVERY 24 HOURS
Status: DISCONTINUED | OUTPATIENT
Start: 2024-12-21 | End: 2024-12-26 | Stop reason: HOSPADM

## 2024-12-21 RX ORDER — TORSEMIDE 20 MG/1
20 TABLET ORAL DAILY
Status: DISCONTINUED | OUTPATIENT
Start: 2024-12-22 | End: 2024-12-26 | Stop reason: HOSPADM

## 2024-12-21 RX ADMIN — OLANZAPINE 5 MG: 5 TABLET, ORALLY DISINTEGRATING ORAL at 17:24

## 2024-12-21 RX ADMIN — INSULIN GLARGINE 15 UNITS: 100 INJECTION, SOLUTION SUBCUTANEOUS at 22:57

## 2024-12-21 RX ADMIN — WATER 10 ML: 1 INJECTION, SOLUTION INTRAMUSCULAR; INTRAVENOUS; SUBCUTANEOUS at 11:29

## 2024-12-21 RX ADMIN — SERTRALINE HYDROCHLORIDE 100 MG: 100 TABLET ORAL at 09:30

## 2024-12-21 RX ADMIN — FUROSEMIDE 40 MG: 10 INJECTION, SOLUTION INTRAMUSCULAR; INTRAVENOUS at 09:29

## 2024-12-21 RX ADMIN — OLANZAPINE 5 MG: 10 INJECTION, POWDER, FOR SOLUTION INTRAMUSCULAR at 11:28

## 2024-12-21 RX ADMIN — LIDOCAINE 1 PATCH: 4 PATCH TOPICAL at 09:28

## 2024-12-21 RX ADMIN — HEPARIN SODIUM 5000 UNITS: 5000 INJECTION, SOLUTION INTRAVENOUS; SUBCUTANEOUS at 09:26

## 2024-12-21 RX ADMIN — ENOXAPARIN SODIUM 40 MG: 40 INJECTION SUBCUTANEOUS at 17:24

## 2024-12-21 RX ADMIN — OLANZAPINE 5 MG: 5 TABLET, ORALLY DISINTEGRATING ORAL at 09:29

## 2024-12-21 ASSESSMENT — ACTIVITIES OF DAILY LIVING (ADL)
ADLS_ACUITY_SCORE: 92
ADLS_ACUITY_SCORE: 96
ADLS_ACUITY_SCORE: 92
ADLS_ACUITY_SCORE: 97
ADLS_ACUITY_SCORE: 92
ADLS_ACUITY_SCORE: 96
ADLS_ACUITY_SCORE: 96
ADLS_ACUITY_SCORE: 97
ADLS_ACUITY_SCORE: 92
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
## 2024-12-21 NOTE — PROGRESS NOTES
NN NAFISA NOTE     Patient listed on [facility] discharge report on 6/16/17for a discharge from [address] [place] [address] for failure to thrive. Outbound call to patient/son today to complete discharge assessment and schedule a follow-up appointment. Unable to reach at listed number. NN left  with contact information. GIT letter sent. Patient was in-patient at [address] [place] [address] from 6/6/17 to 6/16/17. NN placed call to [facility] at  [phone] and was unable to reach nurse at this time to complete medication reconciliation. NN contact information left on  for facility and home. Lakisha Kyle Reference Triage Note By:Mario Mcginnis RN   Emergency Medicine      Triage note: pt arrives from home via EMS. Pt reports lower back pain with movement. Pt was discharged from [facility] ED yesterday for same. Pt with some confusion on arrival.Pt lives with son who declined to come to ED. Electronically signed by Josue Hinds RN at 06/06/17 3128     Reference note by: Rosemarie Fernandez LCSW      Received notification of insurance giving auth for transition to [facility]. Scheduled [facility] to transport at 2:30 pm. NN was notified. Attempted to contact SAGE Valadez) this am. Left detailed message re: pt's medical stability per MD and plan to transition to facility today. Information to accompany pt: Discharge instructions/kardes/summary/UAI/MAR.  (SNF: [facility])  Bunny Smith          Reference Note Trino Parsons MD   Internal Medicine   Chief complain: back pain, confusion     History of present illness  Corrie Quintero is a 79 y.o. female who presents with PMHx of COPD, Hypothyroidism, Hx of compression fracture, who was just seen at [facility] for back pain and released back home. Pt unable to provide any meaningful history due to confusion and probable underlying Dementia. Per records, ems was called for c/o back pain. On arrival, patient covered with stool B/L LE and buttocks and in nails.  Pt unable to recall if she had a fall Pt refusing PO am medications, agitated, will set aside and try later.    and how long she was on the floor. Pt states she has back pain which is worse when she lays on the back, no radiation of pain. Pt unable to describe or quantify the pain. Pt lives with her son at home. No reports of fever, chills, syncope, n/v/d, abdominal pain, chest pain, sob noted.           Past Medical History:   Diagnosis Date    Bowel and bladder incontinence 5/3/2017    Compression fracture      COPD (chronic obstructive pulmonary disease) ([facility] 12/3/2015    Depression 7/1/2011    History of anemia 5/3/2017    Rheumatoid arthritis ([facility] 7/1/2011    Unspecified hypothyroidism 7/1/2011             Recent Labs      06/06/17  1305   WBC 13.0*   HGB 12.9   HCT 40.4             Recent Labs      06/06/17  1305      K 3.6      CO2 28   *   BUN 10   CREA 0.78   CA 9.6   ALB 3.1*   TBILI 0.7   SGOT 22   ALT 17      Imaging  Xray Spine: Lumbar  IMPRESSION: Lumbar spondylosis. No fracture or acute abnormality of the lumbar spine. T10 and T12 compression fractures.     Xray spine: Thoracic   IMPRESSION: Osteopenia with multiple thoracic spine compression fractures of unknown age. At least some of these were present on previous chest radiographs.     Assessment and Plan   Active Problems:  Metabolic encephalopathy (3/4/2799)     Rhabdomyolysis (6/6/2017)     Dehydration (6/6/2017)     Compression fracture of body of thoracic vertebra ([facility] (6/6/2017)        1. Back pain likely related to multiple compression fracture  - MRI Thoracic and Lumbar spine  - Tylenol and Tramadol for Pain   - PT/OT     2. Rhabdomyolysis  - monitor CK  - IVF     3. Metabolic Encephalopathy superimposed on underlying mild dementia  - likely related to dehydration  - c/w IVF  -  consulted for discharge Planning     4. Leukocytosis due to dehydration, no signs of infection at this time     5. Hypothyroidism - c/w Synthroid     6.  COPD  - c/w Nebs and Advair        Diet: Regular  Activity: with PT/OT  DVT prophylaxis: hep sq  Isolation precautions: none  Consultations: IR if acute fractures on MRI   Anticipated disposition: 2 days, needs SNF and probable long term placement as patient is not receiving appropriate care at home. Discharge Summary by:ATTENDING PHYSICIAN: Dr Rashard Dunham  DISCHARGING PROVIDER: Rashard Dunham MD    CONSULTATIONS: IP CONSULT TO INTERVENTIONAL RADIOLOGY  IP CONSULT TO PALLIATIVE CARE - PROVIDER     PROCEDURES/SURGERIES: * No surgery found *     ADMITTING [address] [place] [address] COURSE:   Gian Floyd is a 79 y.o. female who presents with PMHx of COPD, Hypothyroidism, Hx of compression fracture, who was just seen at [facility] for back pain and released back home. Pt unable to provide any meaningful history due to confusion and probable underlying Dementia. Per records, ems was called for back pain. On arrival, patient covered with stool B/L LE and buttocks and in nails. Pt unable to recall if she had a fall and how long she was on the floor. Pt states she has back pain which is worse when she lays on the back, no radiation of pain. Pt unable to describe or quantify the pain. Pt lives with her son at home. No reports of fever, chills, syncope, nausea, vomiting, diarrhea, abdominal pain, chest pain, or sob. Back pain likely related to multiple compression fracture  -s/p Kyphoplasty T6 and T10 by IR on 6/9  -MRI Thoracic on 6/7 acute mild/moderate compression fractures at T6 and T10. No evidence of extension into the posterior elements. No significant bulging/retropulsion, chronic compression fractures are noted at T8, T9, and T12, multilevel degenerative changes described above. -MRI Lumbar spine acute vertebral compression deformity along the superior endplate of S23 without cortical retropulsion or epidural hematoma.  mild multilevel disc and facet degenerative change with minimal anterolisthesis of L5 on S1, mild canal and right foraminal stenosis at L4-5, mild bilateral foraminal stenoses L3-4.  - continue Tylenol and Tramadol for Pain   - PT/OT      Metabolic Encephalopathy superimposed on underlying mild dementia  -CT Head no acute process  - likely related to dehydration, conscious and alert, confused  - improved with IV fluids, now stopped      Leukocytosis due to dehydration  -no signs of infection at this time  -resolved      Hypothyroidism  -continue with Synthroid      COPD vs Pneumonia  -stable  -SaO2 96% on RA  -continue pulmicort,prn duo neb  -Brandyn wheezes  -CXR air space disease  -On Zpack/oral steroid at discharge. Will discharge her on 7 day course of LVQ  Outpatient follow up CXR     Obesity  -diet and weight loss program     Code status: DNR  DVT prophylaxis: heparin     PENDING TEST RESULTS:   At the time of discharge the following test results are still pending: none     FOLLOW UP APPOINTMENTS:   Follow-up Information     Follow up With Details Comments Contact Info     [address]  [phone]      ADDITIONAL CARE RECOMMENDATIONS:     DIET: Mechanical Soft Diet      TUBE FEEDING INSTRUCTIONS: none     OXYGEN / BiPAP SETTINGS: PRN Oxygen 2 l/m via nasal canula if SaO2 < 90% on RA     ACTIVITY: Activity as tolerated     WOUND CARE: none     EQUIPMENT needed: none        DISCHARGE MEDICATIONS:  See Medication Reconciliation Form        NOTIFY YOUR PHYSICIAN FOR ANY OF THE FOLLOWING:   Fever over 101 degrees for 24 hours. Chest pain, shortness of breath, fever, chills, nausea, vomiting, diarrhea, change in mentation, falling, weakness, bleeding. Severe pain or pain not relieved by medications.   Or, any other signs or symptoms that you may have questions about.     DISPOSITION:     Home With:    OT   PT   HH   RN      X SNF/Inpatient Rehab/LTAC     Independent/assisted living     Hospice     Other:         PATIENT CONDITION AT DISCHARGE:      Functional status   x Poor      Deconditioned      Independent    Cognition      Lucid      Forgetful    x Dementia       Catheters/lines (plus indication)     Liu      PICC      PEG    x None       Code status      Full code    X DNR       PHYSICAL EXAMINATION AT DISCHARGE:  Refer to Progress Note     CHRONIC MEDICAL DIAGNOSES:  Problem List as of 6/16/2017  Date Reviewed: 5/3/2017             Codes Class Noted - Resolved     Back pain ICD-10-CM: M54.9  ICD-9-CM: 852. 5   6/11/2017 - Present           Metabolic encephalopathy [facility]-OT: G93.41  ICD-9-CM: 348.31   6/6/2017 - Present           Rhabdomyolysis ICD-10-CM: M62.82  ICD-9-CM: 728.88   6/6/2017 - Present           Dehydration ICD-10-CM: E86.0  ICD-9-CM: 276.51   6/6/2017 - Present           * (Principal)Compression fracture of body of thoracic vertebra (HCC) ICD-10-CM: M48.54XA  ICD-9-CM: 733.13   6/6/2017 - Present           Bowel and bladder incontinence ICD-10-CM: R32, R15.9  ICD-9-CM: 788.30, 787.60   5/3/2017 - Present           History of anemia ICD-10-CM: Z86.2  ICD-9-CM: V12.3   5/3/2017 - Present           COPD (chronic obstructive pulmonary disease) (HCC) ICD-10-CM: J44.9  ICD-9-CM: 496   12/3/2015 - Present           History of pneumonia ICD-10-CM: Z87.01  ICD-9-CM: V12.61   12/3/2015 - Present           Rheumatoid arthritis ([facility] ICD-10-CM: M06.9  ICD-9-CM: 714.0   7/1/2011 - Present           Hypothyroidism ICD-10-CM: E03.9  ICD-9-CM: 244. 9   7/1/2011 - Present           Depression ICD-10-CM: F32.9  ICD-9-CM: 656   7/1/2011 - Present           Dementia ICD-10-CM: F03.90  ICD-9-CM: 294.20   7/1/2011 - Present     Overview Signed 7/1/2011 2:58 PM by Obdulio Calzada MD       mild               RESOLVED: Pneumonia ICD-10-CM: J18.9  ICD-9-CM: 984   10/9/2014 - 12/3/2015                  Greater than 39 minutes were spent with the patient on counseling and coordination of care     Signed:   Faye Tatum MD  6/16/2017  11:51 AM         Electronically signed by Faye Tatum MD at 06/16/17 1151     Case management Plan:  NN will continue to attempt contacts with patient by telephone or during office visit upon discharge from facility. NN will reassess for case management needs prior to discharge from [facility]. Bernardo Robertson

## 2024-12-21 NOTE — PLAN OF CARE
"Alert to self, resistive to cares, refuses assessments, constant encouragement to keep nasal canula on, sats drop to mid 80's on RA, improved with 1-2L NC. Refused am PO medications, pt swatting at staff and swearing, resisting basic cares. Blanchable redness to coccyx, mepilex placed and enforced turning and repositioning. IV lasix given, purewick in place. Pt removed PIV. Electrolytes WNL, recheck tomorrow. Pt stated \"I just want to die, take me out of my misery\", suicide precautions initiated, bedside attendant, psych consult placed.   Goal Outcome Evaluation: No change  Problem: Skin Injury Risk Increased  Goal: Skin Health and Integrity  Outcome: Progressing  Intervention: Plan: Nurse Driven Intervention: Positioning  Recent Flowsheet Documentation  Taken 12/21/2024 0915 by Kaitlin Alex RN  Plan: Positioning Interventions:   REPOSITION Left/Right (No supine) q2h   HOB 30 degrees or less   OFF-LOAD HEELS with pillows  Intervention: Plan: Nurse Driven Intervention: Moisture Management  Recent Flowsheet Documentation  Taken 12/21/2024 0915 by Kaitlin Alex RN  Moisture Interventions:   Urinary collection device   Perineal cleanser  Intervention: Plan: Nurse Driven Intervention: Friction and Shear  Recent Flowsheet Documentation  Taken 12/21/2024 0915 by Kaitlin Alex RN  Friction/Shear Interventions:   HOB 30 degrees or less   Silicone foam sacral dressing   Repositioning device (TAP system, etc.)  Intervention: Optimize Skin Protection  Recent Flowsheet Documentation  Taken 12/21/2024 1115 by Kaitlin Alex RN  Activity Management: patient refuses activity  Taken 12/21/2024 0915 by Kaitlin Alex RN  Pressure Reduction Techniques:   heels elevated off bed   positioned off wounds  Pressure Reduction Devices:   foam padding utilized   positioning supports utilized  Skin Protection: adhesive use limited  Intervention: Promote and Optimize Oral Intake  Recent Flowsheet Documentation  Taken 12/21/2024 " 0915 by Kaitlin Alex, RN  Nutrition Interventions: meal set-up provided

## 2024-12-21 NOTE — PROGRESS NOTES
"Pt making suicidal statements \"I wish I was dead\" and homicidal pt toward the MD. Suicide precautions initiated, bedside sitter reinitiated. Pt also pulled IV and is refusing to let RN place dressing on bleeding arm.   "

## 2024-12-21 NOTE — PLAN OF CARE
Summary: 1900-0730  Primary Diagnosis: Acute hypoxic respiratory failure, HF and Pneumonia     Orientation: A&O to self, drowsy, slept through the night  Aggression Stop Light: Green  Activity: 2A T&R  Diet/BS Checks: Soft bites with thickened liquid, ACHS   Tele: NA  IV Access/Drains: R PIV, SL  Pain Management: No pain reported, pt seemed comfortable sleeping  Abnormal VS/Results: VSS ex HTN, on 2L NC  Bowel/Bladder: Incontinent of B&B, external catheter in place  Skin/Wounds: Scattered bruising and scabs, blanchable redness to saccrum/coccyx, mepi in place  Consults: Psychiatry  D/C [facility]position[facility] To [facility] pending O2 status  Other Info:   -Pt refused to take PO medications  -On Mag + K+ replacement, recheck this AM
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
## 2024-12-22 LAB
BACTERIA UR CULT: ABNORMAL
GLUCOSE BLDC GLUCOMTR-MCNC: 115 MG/DL (ref 70–99)
GLUCOSE BLDC GLUCOMTR-MCNC: 120 MG/DL (ref 70–99)
GLUCOSE BLDC GLUCOMTR-MCNC: 124 MG/DL (ref 70–99)
GLUCOSE BLDC GLUCOMTR-MCNC: 199 MG/DL (ref 70–99)
GLUCOSE BLDC GLUCOMTR-MCNC: 308 MG/DL (ref 70–99)

## 2024-12-22 PROCEDURE — 250N000013 HC RX MED GY IP 250 OP 250 PS 637: Performed by: INTERNAL MEDICINE

## 2024-12-22 PROCEDURE — 250N000013 HC RX MED GY IP 250 OP 250 PS 637: Performed by: PSYCHIATRY & NEUROLOGY

## 2024-12-22 PROCEDURE — 250N000013 HC RX MED GY IP 250 OP 250 PS 637: Performed by: STUDENT IN AN ORGANIZED HEALTH CARE EDUCATION/TRAINING PROGRAM

## 2024-12-22 PROCEDURE — 120N000001 HC R&B MED SURG/OB

## 2024-12-22 PROCEDURE — 250N000011 HC RX IP 250 OP 636: Performed by: PSYCHIATRY & NEUROLOGY

## 2024-12-22 PROCEDURE — 99233 SBSQ HOSP IP/OBS HIGH 50: CPT | Performed by: STUDENT IN AN ORGANIZED HEALTH CARE EDUCATION/TRAINING PROGRAM

## 2024-12-22 PROCEDURE — 250N000011 HC RX IP 250 OP 636: Performed by: STUDENT IN AN ORGANIZED HEALTH CARE EDUCATION/TRAINING PROGRAM

## 2024-12-22 RX ADMIN — Medication 5 MG: at 21:12

## 2024-12-22 RX ADMIN — ALLOPURINOL 200 MG: 100 TABLET ORAL at 09:17

## 2024-12-22 RX ADMIN — AMLODIPINE BESYLATE 10 MG: 10 TABLET ORAL at 20:19

## 2024-12-22 RX ADMIN — METOPROLOL TARTRATE 50 MG: 50 TABLET, FILM COATED ORAL at 09:17

## 2024-12-22 RX ADMIN — LOSARTAN POTASSIUM 50 MG: 50 TABLET, FILM COATED ORAL at 09:18

## 2024-12-22 RX ADMIN — ACETAMINOPHEN 1000 MG: 500 TABLET, FILM COATED ORAL at 09:16

## 2024-12-22 RX ADMIN — SIMVASTATIN 20 MG: 20 TABLET, FILM COATED ORAL at 21:12

## 2024-12-22 RX ADMIN — ACETAMINOPHEN 1000 MG: 500 TABLET, FILM COATED ORAL at 21:12

## 2024-12-22 RX ADMIN — CLOPIDOGREL BISULFATE 75 MG: 75 TABLET ORAL at 09:17

## 2024-12-22 RX ADMIN — ENOXAPARIN SODIUM 40 MG: 40 INJECTION SUBCUTANEOUS at 17:37

## 2024-12-22 RX ADMIN — SENNOSIDES AND DOCUSATE SODIUM 2 TABLET: 50; 8.6 TABLET ORAL at 17:37

## 2024-12-22 RX ADMIN — TORSEMIDE 20 MG: 20 TABLET ORAL at 09:40

## 2024-12-22 RX ADMIN — OLANZAPINE 5 MG: 10 INJECTION, POWDER, FOR SOLUTION INTRAMUSCULAR at 05:14

## 2024-12-22 RX ADMIN — OLANZAPINE 5 MG: 5 TABLET, ORALLY DISINTEGRATING ORAL at 21:12

## 2024-12-22 RX ADMIN — OLANZAPINE 5 MG: 5 TABLET, ORALLY DISINTEGRATING ORAL at 09:34

## 2024-12-22 RX ADMIN — METOPROLOL TARTRATE 50 MG: 50 TABLET, FILM COATED ORAL at 20:19

## 2024-12-22 RX ADMIN — ACETAMINOPHEN 1000 MG: 500 TABLET, FILM COATED ORAL at 17:37

## 2024-12-22 RX ADMIN — AMLODIPINE BESYLATE 10 MG: 10 TABLET ORAL at 09:17

## 2024-12-22 RX ADMIN — INSULIN GLARGINE 15 UNITS: 100 INJECTION, SOLUTION SUBCUTANEOUS at 21:13

## 2024-12-22 RX ADMIN — SERTRALINE HYDROCHLORIDE 100 MG: 100 TABLET ORAL at 09:40

## 2024-12-22 RX ADMIN — OLANZAPINE 5 MG: 5 TABLET, ORALLY DISINTEGRATING ORAL at 17:37

## 2024-12-22 RX ADMIN — OLANZAPINE 5 MG: 5 TABLET, ORALLY DISINTEGRATING ORAL at 12:50

## 2024-12-22 RX ADMIN — INSULIN ASPART 3 UNITS: 100 INJECTION, SOLUTION INTRAVENOUS; SUBCUTANEOUS at 21:13

## 2024-12-22 ASSESSMENT — ACTIVITIES OF DAILY LIVING (ADL)
ADLS_ACUITY_SCORE: 97
ADLS_ACUITY_SCORE: 94
ADLS_ACUITY_SCORE: 97
ADLS_ACUITY_SCORE: 97
ADLS_ACUITY_SCORE: 89
ADLS_ACUITY_SCORE: 98
ADLS_ACUITY_SCORE: 97
ADLS_ACUITY_SCORE: 97
ADLS_ACUITY_SCORE: 98
ADLS_ACUITY_SCORE: 98
ADLS_ACUITY_SCORE: 97
ADLS_ACUITY_SCORE: 89
ADLS_ACUITY_SCORE: 97
ADLS_ACUITY_SCORE: 98
ADLS_ACUITY_SCORE: 94
ADLS_ACUITY_SCORE: 97
ADLS_ACUITY_SCORE: 97

## 2024-12-22 NOTE — PROVIDER NOTIFICATION
MD Notification    Notified Person: MD    Notified Person Name: Dr. Soy Hernandez    Notification Date/Time: 8:36 AM  12/22/24     Notification Interaction: [employer] Messaging    Purpose of Notification:  Christian Garcia Background: HF exacerbation/pneumonia Assessment: FYI-Pt refused blood draw this morning.    Orders Received: Ok    Comments:
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
## 2024-12-23 ENCOUNTER — APPOINTMENT (OUTPATIENT)
Dept: SPEECH THERAPY | Facility: CLINIC | Age: 74
End: 2024-12-23
Attending: PSYCHIATRY & NEUROLOGY
Payer: COMMERCIAL

## 2024-12-23 LAB
ANION GAP SERPL CALCULATED.3IONS-SCNC: 12 MMOL/L (ref 7–15)
BUN SERPL-MCNC: 37.4 MG/DL (ref 8–23)
CALCIUM SERPL-MCNC: 8.8 MG/DL (ref 8.8–10.4)
CHLORIDE SERPL-SCNC: 95 MMOL/L (ref 98–107)
CREAT SERPL-MCNC: 0.7 MG/DL (ref 0.51–0.95)
EGFRCR SERPLBLD CKD-EPI 2021: 90 ML/MIN/1.73M2
ERYTHROCYTE [DISTWIDTH] IN BLOOD BY AUTOMATED COUNT: 16.3 % (ref 10–15)
GLUCOSE BLDC GLUCOMTR-MCNC: 137 MG/DL (ref 70–99)
GLUCOSE BLDC GLUCOMTR-MCNC: 143 MG/DL (ref 70–99)
GLUCOSE BLDC GLUCOMTR-MCNC: 166 MG/DL (ref 70–99)
GLUCOSE BLDC GLUCOMTR-MCNC: 181 MG/DL (ref 70–99)
GLUCOSE BLDC GLUCOMTR-MCNC: 238 MG/DL (ref 70–99)
GLUCOSE SERPL-MCNC: 170 MG/DL (ref 70–99)
HCO3 SERPL-SCNC: 32 MMOL/L (ref 22–29)
HCT VFR BLD AUTO: 31.5 % (ref 35–47)
HGB BLD-MCNC: 9.8 G/DL (ref 11.7–15.7)
MAGNESIUM SERPL-MCNC: 1.8 MG/DL (ref 1.7–2.3)
MCH RBC QN AUTO: 29.7 PG (ref 26.5–33)
MCHC RBC AUTO-ENTMCNC: 31.1 G/DL (ref 31.5–36.5)
MCV RBC AUTO: 96 FL (ref 78–100)
PLATELET # BLD AUTO: 204 10E3/UL (ref 150–450)
POTASSIUM SERPL-SCNC: 3.8 MMOL/L (ref 3.4–5.3)
RBC # BLD AUTO: 3.3 10E6/UL (ref 3.8–5.2)
SODIUM SERPL-SCNC: 139 MMOL/L (ref 135–145)
WBC # BLD AUTO: 6.3 10E3/UL (ref 4–11)

## 2024-12-23 PROCEDURE — 250N000013 HC RX MED GY IP 250 OP 250 PS 637: Performed by: INTERNAL MEDICINE

## 2024-12-23 PROCEDURE — 250N000013 HC RX MED GY IP 250 OP 250 PS 637: Performed by: PSYCHIATRY & NEUROLOGY

## 2024-12-23 PROCEDURE — 250N000013 HC RX MED GY IP 250 OP 250 PS 637: Performed by: STUDENT IN AN ORGANIZED HEALTH CARE EDUCATION/TRAINING PROGRAM

## 2024-12-23 PROCEDURE — 92526 ORAL FUNCTION THERAPY: CPT | Mod: GN

## 2024-12-23 PROCEDURE — 85027 COMPLETE CBC AUTOMATED: CPT | Performed by: STUDENT IN AN ORGANIZED HEALTH CARE EDUCATION/TRAINING PROGRAM

## 2024-12-23 PROCEDURE — 36415 COLL VENOUS BLD VENIPUNCTURE: CPT | Performed by: STUDENT IN AN ORGANIZED HEALTH CARE EDUCATION/TRAINING PROGRAM

## 2024-12-23 PROCEDURE — 83735 ASSAY OF MAGNESIUM: CPT | Performed by: STUDENT IN AN ORGANIZED HEALTH CARE EDUCATION/TRAINING PROGRAM

## 2024-12-23 PROCEDURE — 80048 BASIC METABOLIC PNL TOTAL CA: CPT | Performed by: STUDENT IN AN ORGANIZED HEALTH CARE EDUCATION/TRAINING PROGRAM

## 2024-12-23 PROCEDURE — 120N000001 HC R&B MED SURG/OB

## 2024-12-23 PROCEDURE — 250N000011 HC RX IP 250 OP 636: Performed by: STUDENT IN AN ORGANIZED HEALTH CARE EDUCATION/TRAINING PROGRAM

## 2024-12-23 PROCEDURE — 99233 SBSQ HOSP IP/OBS HIGH 50: CPT | Performed by: STUDENT IN AN ORGANIZED HEALTH CARE EDUCATION/TRAINING PROGRAM

## 2024-12-23 PROCEDURE — 82310 ASSAY OF CALCIUM: CPT | Performed by: STUDENT IN AN ORGANIZED HEALTH CARE EDUCATION/TRAINING PROGRAM

## 2024-12-23 RX ORDER — OXYCODONE HYDROCHLORIDE 5 MG/1
5 TABLET ORAL EVERY 4 HOURS PRN
Status: DISCONTINUED | OUTPATIENT
Start: 2024-12-23 | End: 2024-12-26 | Stop reason: HOSPADM

## 2024-12-23 RX ADMIN — INSULIN ASPART 1 UNITS: 100 INJECTION, SOLUTION INTRAVENOUS; SUBCUTANEOUS at 21:40

## 2024-12-23 RX ADMIN — ACETAMINOPHEN 1000 MG: 500 TABLET, FILM COATED ORAL at 08:54

## 2024-12-23 RX ADMIN — OLANZAPINE 5 MG: 5 TABLET, ORALLY DISINTEGRATING ORAL at 08:55

## 2024-12-23 RX ADMIN — ALLOPURINOL 200 MG: 100 TABLET ORAL at 08:55

## 2024-12-23 RX ADMIN — Medication 5 MG: at 21:33

## 2024-12-23 RX ADMIN — AMLODIPINE BESYLATE 10 MG: 10 TABLET ORAL at 20:30

## 2024-12-23 RX ADMIN — TORSEMIDE 20 MG: 20 TABLET ORAL at 09:03

## 2024-12-23 RX ADMIN — OLANZAPINE 5 MG: 5 TABLET, ORALLY DISINTEGRATING ORAL at 17:49

## 2024-12-23 RX ADMIN — OLANZAPINE 5 MG: 5 TABLET, ORALLY DISINTEGRATING ORAL at 21:34

## 2024-12-23 RX ADMIN — OLANZAPINE 5 MG: 5 TABLET, ORALLY DISINTEGRATING ORAL at 12:36

## 2024-12-23 RX ADMIN — POLYETHYLENE GLYCOL 3350 17 G: 17 POWDER, FOR SOLUTION ORAL at 16:11

## 2024-12-23 RX ADMIN — AMLODIPINE BESYLATE 10 MG: 10 TABLET ORAL at 08:55

## 2024-12-23 RX ADMIN — SIMVASTATIN 20 MG: 20 TABLET, FILM COATED ORAL at 21:33

## 2024-12-23 RX ADMIN — ENOXAPARIN SODIUM 40 MG: 40 INJECTION SUBCUTANEOUS at 17:50

## 2024-12-23 RX ADMIN — SERTRALINE HYDROCHLORIDE 100 MG: 100 TABLET ORAL at 08:55

## 2024-12-23 RX ADMIN — INSULIN GLARGINE 15 UNITS: 100 INJECTION, SOLUTION SUBCUTANEOUS at 21:40

## 2024-12-23 RX ADMIN — SENNOSIDES AND DOCUSATE SODIUM 2 TABLET: 50; 8.6 TABLET ORAL at 08:54

## 2024-12-23 RX ADMIN — LOSARTAN POTASSIUM 50 MG: 50 TABLET, FILM COATED ORAL at 08:54

## 2024-12-23 RX ADMIN — LIDOCAINE 1 PATCH: 4 PATCH TOPICAL at 17:03

## 2024-12-23 RX ADMIN — CLOPIDOGREL BISULFATE 75 MG: 75 TABLET ORAL at 08:55

## 2024-12-23 RX ADMIN — ACETAMINOPHEN 650 MG: 325 TABLET, FILM COATED ORAL at 04:11

## 2024-12-23 RX ADMIN — SENNOSIDES AND DOCUSATE SODIUM 2 TABLET: 50; 8.6 TABLET ORAL at 17:50

## 2024-12-23 RX ADMIN — ACETAMINOPHEN 1000 MG: 500 TABLET, FILM COATED ORAL at 16:10

## 2024-12-23 RX ADMIN — METOPROLOL TARTRATE 50 MG: 50 TABLET, FILM COATED ORAL at 08:55

## 2024-12-23 RX ADMIN — METOPROLOL TARTRATE 50 MG: 50 TABLET, FILM COATED ORAL at 20:29

## 2024-12-23 RX ADMIN — ACETAMINOPHEN 1000 MG: 500 TABLET, FILM COATED ORAL at 21:33

## 2024-12-23 ASSESSMENT — ACTIVITIES OF DAILY LIVING (ADL)
ADLS_ACUITY_SCORE: 96
ADLS_ACUITY_SCORE: 94
ADLS_ACUITY_SCORE: 91
ADLS_ACUITY_SCORE: 96
ADLS_ACUITY_SCORE: 91
ADLS_ACUITY_SCORE: 94
ADLS_ACUITY_SCORE: 96
ADLS_ACUITY_SCORE: 94
ADLS_ACUITY_SCORE: 93
ADLS_ACUITY_SCORE: 91
ADLS_ACUITY_SCORE: 93
ADLS_ACUITY_SCORE: 96
ADLS_ACUITY_SCORE: 91
ADLS_ACUITY_SCORE: 96
ADLS_ACUITY_SCORE: 94

## 2024-12-23 NOTE — PLAN OF CARE
Orientation: Alert to self only. Pt combative and verbally abusive  Aggression Stop Light: yellow  Activity: Ax2   Diet/BS Checks: BS ACHS. Soft bite size diet with thickened liquids.  Tele:  N/A  IV Access/Drains: No IV-MD aware  Pain Management:   Abnormal VS/Results: VSS ex HTN. On 2L NC  Bowel/Bladder: Incontinent B/B. Purewick attempted-pt pulls out.  Skin/Wounds: Scattered bruises and scabs. Blanchable redness on Sacrum-Mepi in place.  Consults: Psych, Palliative  D/C Disposition: Back to [facility]  Other Info: Pt combative and verbally abusive. T/Rq2hr
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
## 2024-12-23 NOTE — PROVIDER NOTIFICATION
MD Notification    Notified Person: MD    Notified Person Name: Dr. Soy Hernandez    Notification Date/Time: 10:35 AM 12/23/24     Notification Interaction: Vocera Mesaging     Purpose of Notification: Can you order a WOC consult-pt has open sore on buttocks, Thank you.    Orders Received: Ordered    Comments:

## 2024-12-23 NOTE — PROGRESS NOTES
BRIEF NUTRITION REASSESSMENT        CURRENT DIET AND INTAKE:  Diet:  Soft/Bite Sized, Thin Liquids            SAFE TRAY           Room Service with Assist             Chart reviewed  Spoke with bedside RN  Pt tolerating po - eating really well  Has been receiving full meals TID  Likes to have condiments with her food    ANTHROPOMETRICS:  12/15/24 0638 88.7 kg (195 lb 8.8 oz) Bed scale   12/14/24 0646 88.7 kg (195 lb 8.8 oz) Bed scale   12/12/24 0600 91.8 kg (202 lb 6.1 oz) Bed scale   12/11/24 0600 92 kg (202 lb 13.2 oz) Bed scale   12/10/24 0500 91.7 kg (202 lb 2.6 oz) Bed scale   12/07/24 2100 91.7 kg (202 lb 2.6 oz) Bed scale   12/07/24 0948 89.8 kg (198 lb) --       LABS:  Labs noted    MALNUTRITION:  Patient does not meet two of the criteria necessary for diagnosing malnutrition.       NUTRITION INTERVENTION:  Nutrition Diagnosis:  No nutrition diagnosis at this time.    Implementation:  Continue to assist with daily meal ordering    FOLLOW UP/MONITORING:   Will re-evaluate in 7 - 10 days, or sooner, if re-consulted.

## 2024-12-23 NOTE — PLAN OF CARE
Summary: 1900-0730  Primary Diagnosis: HF excerebration and Pneumonia     Orientation: A&O to self, more alert and interactive on this shift, was able to express needs   Aggression Stop Light: Green  Activity: 2A T&R  Diet/BS Checks: Soft bites with thickened liquid, ACHS, bedtime , 2   Tele: NA  IV Access/Drains: No PIV access  Pain Management: Reported headache, PRN Tylenol given with good effect  Abnormal VS/Results: VSS, on 2L NC  Bowel/Bladder: Incontinent of B&B  Skin/Wounds: Scattered bruising and scabs, blanchable redness to saccrum/coccyx, mepi in place  Consults: Psychiatry  D/C Disposition: To [facility] pending O2 status  Other Info:   -On Mag and K+ replacement, recheck this AM

## 2024-12-24 LAB
ANION GAP SERPL CALCULATED.3IONS-SCNC: 10 MMOL/L (ref 7–15)
BUN SERPL-MCNC: 44.2 MG/DL (ref 8–23)
CALCIUM SERPL-MCNC: 9.1 MG/DL (ref 8.8–10.4)
CHLORIDE SERPL-SCNC: 97 MMOL/L (ref 98–107)
CREAT SERPL-MCNC: 0.73 MG/DL (ref 0.51–0.95)
EGFRCR SERPLBLD CKD-EPI 2021: 85 ML/MIN/1.73M2
ERYTHROCYTE [DISTWIDTH] IN BLOOD BY AUTOMATED COUNT: 15.9 % (ref 10–15)
GLUCOSE BLDC GLUCOMTR-MCNC: 168 MG/DL (ref 70–99)
GLUCOSE BLDC GLUCOMTR-MCNC: 204 MG/DL (ref 70–99)
GLUCOSE BLDC GLUCOMTR-MCNC: 220 MG/DL (ref 70–99)
GLUCOSE BLDC GLUCOMTR-MCNC: 226 MG/DL (ref 70–99)
GLUCOSE BLDC GLUCOMTR-MCNC: 284 MG/DL (ref 70–99)
GLUCOSE SERPL-MCNC: 237 MG/DL (ref 70–99)
HCO3 SERPL-SCNC: 33 MMOL/L (ref 22–29)
HCT VFR BLD AUTO: 30.9 % (ref 35–47)
HGB BLD-MCNC: 9.4 G/DL (ref 11.7–15.7)
MAGNESIUM SERPL-MCNC: 1.8 MG/DL (ref 1.7–2.3)
MCH RBC QN AUTO: 29.1 PG (ref 26.5–33)
MCHC RBC AUTO-ENTMCNC: 30.4 G/DL (ref 31.5–36.5)
MCV RBC AUTO: 96 FL (ref 78–100)
PLATELET # BLD AUTO: 198 10E3/UL (ref 150–450)
POTASSIUM SERPL-SCNC: 4 MMOL/L (ref 3.4–5.3)
RBC # BLD AUTO: 3.23 10E6/UL (ref 3.8–5.2)
SODIUM SERPL-SCNC: 140 MMOL/L (ref 135–145)
WBC # BLD AUTO: 6.4 10E3/UL (ref 4–11)

## 2024-12-24 PROCEDURE — 99233 SBSQ HOSP IP/OBS HIGH 50: CPT | Performed by: STUDENT IN AN ORGANIZED HEALTH CARE EDUCATION/TRAINING PROGRAM

## 2024-12-24 PROCEDURE — 250N000013 HC RX MED GY IP 250 OP 250 PS 637: Performed by: STUDENT IN AN ORGANIZED HEALTH CARE EDUCATION/TRAINING PROGRAM

## 2024-12-24 PROCEDURE — 83735 ASSAY OF MAGNESIUM: CPT | Performed by: STUDENT IN AN ORGANIZED HEALTH CARE EDUCATION/TRAINING PROGRAM

## 2024-12-24 PROCEDURE — 85027 COMPLETE CBC AUTOMATED: CPT | Performed by: STUDENT IN AN ORGANIZED HEALTH CARE EDUCATION/TRAINING PROGRAM

## 2024-12-24 PROCEDURE — 99232 SBSQ HOSP IP/OBS MODERATE 35: CPT | Performed by: PHYSICIAN ASSISTANT

## 2024-12-24 PROCEDURE — 250N000011 HC RX IP 250 OP 636: Mod: JW | Performed by: PSYCHIATRY & NEUROLOGY

## 2024-12-24 PROCEDURE — 250N000011 HC RX IP 250 OP 636: Performed by: STUDENT IN AN ORGANIZED HEALTH CARE EDUCATION/TRAINING PROGRAM

## 2024-12-24 PROCEDURE — 80048 BASIC METABOLIC PNL TOTAL CA: CPT | Performed by: STUDENT IN AN ORGANIZED HEALTH CARE EDUCATION/TRAINING PROGRAM

## 2024-12-24 PROCEDURE — 250N000013 HC RX MED GY IP 250 OP 250 PS 637: Performed by: INTERNAL MEDICINE

## 2024-12-24 PROCEDURE — 36415 COLL VENOUS BLD VENIPUNCTURE: CPT | Performed by: STUDENT IN AN ORGANIZED HEALTH CARE EDUCATION/TRAINING PROGRAM

## 2024-12-24 PROCEDURE — 120N000001 HC R&B MED SURG/OB

## 2024-12-24 PROCEDURE — 82310 ASSAY OF CALCIUM: CPT | Performed by: STUDENT IN AN ORGANIZED HEALTH CARE EDUCATION/TRAINING PROGRAM

## 2024-12-24 PROCEDURE — G0463 HOSPITAL OUTPT CLINIC VISIT: HCPCS

## 2024-12-24 PROCEDURE — 250N000013 HC RX MED GY IP 250 OP 250 PS 637: Performed by: PSYCHIATRY & NEUROLOGY

## 2024-12-24 RX ADMIN — SENNOSIDES AND DOCUSATE SODIUM 2 TABLET: 50; 8.6 TABLET ORAL at 09:08

## 2024-12-24 RX ADMIN — ACETAMINOPHEN 1000 MG: 500 TABLET, FILM COATED ORAL at 09:07

## 2024-12-24 RX ADMIN — LIDOCAINE 1 PATCH: 4 PATCH TOPICAL at 09:09

## 2024-12-24 RX ADMIN — ENOXAPARIN SODIUM 40 MG: 40 INJECTION SUBCUTANEOUS at 16:45

## 2024-12-24 RX ADMIN — TORSEMIDE 20 MG: 20 TABLET ORAL at 09:07

## 2024-12-24 RX ADMIN — METOPROLOL TARTRATE 50 MG: 50 TABLET, FILM COATED ORAL at 09:08

## 2024-12-24 RX ADMIN — AMLODIPINE BESYLATE 10 MG: 10 TABLET ORAL at 09:08

## 2024-12-24 RX ADMIN — INSULIN GLARGINE 15 UNITS: 100 INJECTION, SOLUTION SUBCUTANEOUS at 21:41

## 2024-12-24 RX ADMIN — OLANZAPINE 5 MG: 5 TABLET, ORALLY DISINTEGRATING ORAL at 17:56

## 2024-12-24 RX ADMIN — INSULIN ASPART 1 UNITS: 100 INJECTION, SOLUTION INTRAVENOUS; SUBCUTANEOUS at 21:41

## 2024-12-24 RX ADMIN — CLOPIDOGREL BISULFATE 75 MG: 75 TABLET ORAL at 09:08

## 2024-12-24 RX ADMIN — ACETAMINOPHEN 1000 MG: 500 TABLET, FILM COATED ORAL at 16:44

## 2024-12-24 RX ADMIN — OXYCODONE HYDROCHLORIDE 5 MG: 5 TABLET ORAL at 00:19

## 2024-12-24 RX ADMIN — SERTRALINE HYDROCHLORIDE 100 MG: 100 TABLET ORAL at 09:08

## 2024-12-24 RX ADMIN — Medication 5 MG: at 21:39

## 2024-12-24 RX ADMIN — OLANZAPINE 5 MG: 10 INJECTION, POWDER, FOR SOLUTION INTRAMUSCULAR at 00:19

## 2024-12-24 RX ADMIN — SENNOSIDES AND DOCUSATE SODIUM 2 TABLET: 50; 8.6 TABLET ORAL at 17:56

## 2024-12-24 RX ADMIN — OLANZAPINE 5 MG: 5 TABLET, ORALLY DISINTEGRATING ORAL at 09:08

## 2024-12-24 RX ADMIN — ALLOPURINOL 200 MG: 100 TABLET ORAL at 09:08

## 2024-12-24 RX ADMIN — METOPROLOL TARTRATE 50 MG: 50 TABLET, FILM COATED ORAL at 21:40

## 2024-12-24 RX ADMIN — ACETAMINOPHEN 650 MG: 325 TABLET, FILM COATED ORAL at 03:51

## 2024-12-24 RX ADMIN — OLANZAPINE 5 MG: 5 TABLET, ORALLY DISINTEGRATING ORAL at 13:25

## 2024-12-24 RX ADMIN — LOSARTAN POTASSIUM 50 MG: 50 TABLET, FILM COATED ORAL at 09:08

## 2024-12-24 RX ADMIN — ACETAMINOPHEN 1000 MG: 500 TABLET, FILM COATED ORAL at 21:40

## 2024-12-24 RX ADMIN — OLANZAPINE 5 MG: 5 TABLET, ORALLY DISINTEGRATING ORAL at 21:40

## 2024-12-24 RX ADMIN — AMLODIPINE BESYLATE 10 MG: 10 TABLET ORAL at 21:40

## 2024-12-24 RX ADMIN — SIMVASTATIN 20 MG: 20 TABLET, FILM COATED ORAL at 21:39

## 2024-12-24 ASSESSMENT — ACTIVITIES OF DAILY LIVING (ADL)
ADLS_ACUITY_SCORE: 88
ADLS_ACUITY_SCORE: 97
ADLS_ACUITY_SCORE: 93
ADLS_ACUITY_SCORE: 97
ADLS_ACUITY_SCORE: 92
ADLS_ACUITY_SCORE: 97
ADLS_ACUITY_SCORE: 93
ADLS_ACUITY_SCORE: 97
ADLS_ACUITY_SCORE: 93
ADLS_ACUITY_SCORE: 92
ADLS_ACUITY_SCORE: 97
ADLS_ACUITY_SCORE: 93
ADLS_ACUITY_SCORE: 92
ADLS_ACUITY_SCORE: 93
ADLS_ACUITY_SCORE: 92

## 2024-12-24 NOTE — DISCHARGE INSTRUCTIONS
WOUND CARES  Location: buttock  Care: provided qshift  1. Cleanse with each incontinence episode or at least every 2 hours with routine turns with incontiennce cleanser and soft dry wipes  2. Apply Triad paste or like product to buttock and perianal . No need to scrub off all white paste each time, wipe off debris and apply more  3. Do not apply mepilex sacral to this patients buttock  4. Do not apply diapers while in bed. Use cardinal covidien pad instead      MENTAL HEALTH RESOURCES & SERVICES:   Behavioral Healthcare Providers Scheduling  During your hospitalization, you were seen by a licensed mental health professional [facility]  for a brief mental health assessment and/or psychotherapy at [facility] , a [facility] of [facility].  It is recommended that you follow up with your established providers (psychiatrist, mental health therapist, and/or primary care doctor - as relevant) as soon as possible. Coordinators from [facility] will be calling you in the next 24-48 hours to ensure that you have the resources you need.  You can also contact [facility] coordinators directly at [phone].    You have been scheduled for the following mental health appointments:     Date: Thursday, 1/2/2025  Time: 12:00 pm - 1:00 pm  Provider: Douglas FERNANDEZ  CNP,PMHNP,RN  Location: [facility], [address] [place] [address], [address], [place] [address]  Phone: [phone]  Type: Telepsychiatry    Patient instructions  Before your appointment, you must speak with our Intake Department. Our intake team will attempt to contact you. If you do not hear from them within 24 hours, please call them at [phone] and tell them you are a [facility] referral. If you do not speak with our Intake Department and complete the necessary paperwork they send you, we cannot see you at your scheduled appointment time. Appointment times are  not guaranteed until verified with [facility] intake team.       [facility] maintains an extensive network of licensed behavioral health providers to connect patients with the services they need.  We do not charge providers a fee to participate in our referral network.  We match patients with providers based on a patient s specific needs, insurance coverage, and location.  Our first effort will be to refer you to a provider within your care system, and will utilize providers outside your care system as needed.

## 2024-12-24 NOTE — CONSULTS
Psychiatry Consultation; Follow up              Reason for Consult, requesting source:      Requesting source: Tee Linares    Labs and imaging reviewed,     Total time spent in chart review, patient interview and coordination of care; 606           Interim history:    Patient expressed SI over the weekend to staff. On approach this AM she does not recall making those statements. She is feeling somewhat better today and is now aware that she is in the hospital, though she is not sure why. Did require IM zyprexa last night, but has largely been taking oral for the last few days        Current Medications:     Current Facility-Administered Medications   Medication Dose Route Frequency Provider Last Rate Last Admin    acetaminophen (TYLENOL) tablet 1,000 mg  1,000 mg Oral TID Es Wolff MD   1,000 mg at 12/23/24 2133    allopurinol (ZYLOPRIM) tablet 200 mg  200 mg Oral Daily Tee Linares MD   200 mg at 12/23/24 0855    amLODIPine (NORVASC) tablet 10 mg  10 mg Oral BID Joslyn Reddy DO   10 mg at 12/23/24 2030    clopidogrel (PLAVIX) tablet 75 mg  75 mg Oral Daily Tee Linares MD   75 mg at 12/23/24 0855    cyanocobalamin injection 1,000 mcg  1,000 mcg Intramuscular Q7 Days [facility]   1,000 mcg at 12/20/24 1625    enoxaparin ANTICOAGULANT (LOVENOX) injection 40 mg  40 mg Subcutaneous Q24H Soy Hernandez MD   40 mg at 12/23/24 1750    insulin aspart (NovoLOG) injection (RAPID ACTING)  1-7 Units Subcutaneous TID AC Es Wolff MD   1 Units at 12/23/24 1735    insulin aspart (NovoLOG) injection (RAPID ACTING)  1-5 Units Subcutaneous At Bedtime Es Wolff MD   1 Units at 12/23/24 2140    insulin glargine (LANTUS PEN) injection 15 Units  15 Units Subcutaneous At Bedtime Joslyn Reddy DO   15 Units at 12/23/24 2140    Lidocaine (LIDOCARE) 4 % Patch 1 patch  1 patch Transdermal Q24H Es Wolff MD   1 patch  "at 12/23/24 1703    losartan (COZAAR) tablet 50 mg  50 mg Oral Daily Gustavo Bose MD   50 mg at 12/23/24 0854    melatonin tablet 5 mg  5 mg Oral At Bedtime Marsha Little MD   5 mg at 12/23/24 2133    metoprolol tartrate (LOPRESSOR) tablet 50 mg  50 mg Oral BID Bobby Romanzoone, DO   50 mg at 12/23/24 2029    OLANZapine zydis (zyPREXA) ODT tab 5 mg  5 mg Oral 4x Daily Marsha Little MD   5 mg at 12/23/24 2134    polyethylene glycol (MIRALAX) Packet 17 g  17 g Oral Daily Tee Linares MD   17 g at 12/23/24 1611    psyllium (METAMUCIL/KONSYL) Packet 1 packet  1 packet Oral Daily Francisco Javier Ramey PA-C   1 packet at 12/20/24 0938    senna-docusate (SENOKOT-S/PERICOLACE) 8.6-50 MG per tablet 2 tablet  2 tablet Oral BID Es Wolff MD   2 tablet at 12/23/24 1750    sertraline (ZOLOFT) tablet 100 mg  100 mg Oral Daily Es Wolff MD   100 mg at 12/23/24 0855    simvastatin (ZOCOR) tablet 20 mg  20 mg Oral At Bedtime Tee Linares MD   20 mg at 12/23/24 2133    sodium chloride (PF) 0.9% PF flush 3 mL  3 mL Intracatheter Q8H Tee Linares MD   3 mL at 12/21/24 0929    torsemide (DEMADEX) tablet 20 mg  20 mg Oral Daily Soy Hernandez MD   20 mg at 12/23/24 0903              MSE:   Appearance: awake, alert  Attitude:  cooperative  Eye Contact:  good  Mood:  \"fair\"  Affect:  slightly restricted  Speech:  clear, coherent  Psychomotor Behavior:  no evidence of tardive dyskinesia, dystonia, or tics  Muscle strength and tone: intact   Thought Process:  logical  Associations:  no loose associations  Thought Content:  no evidence of suicidal ideation or homicidal ideation  Insight:  limited  Judgement:  limited  Oriented to:  time, person, and place  Attention Span and Concentration:  fair  Recent and Remote Memory:  fair    Vital signs:  Temp: 97.3  F (36.3  C) Temp src: Oral BP: (!) 129/96 Pulse: 77   Resp: 18 SpO2: 92 % O2 Device: Nasal cannula Oxygen Delivery: 1 LPM " "Height: 165.1 cm (5' 5\") Weight: 86.8 kg (191 lb 5.8 oz)  Estimated body mass index is 31.84 kg/m  as calculated from the following:    Height as of this encounter: 1.651 m (5' 5\").    Weight as of this encounter: 86.8 kg (191 lb 5.8 oz).    Qtc:          DSM-5 Diagnosis:   Neurocognitive disorder          Assessment:   Patient is reportedly mildly confused at baseline, so appears to be approaching her normal level of functioning. She does not appear to be at risk of intentional self harm, and does not recall making any suicidal statements.     Prolonged hospitalization will continue to put her at risk of delirium, as the hospital environment is quite disorienting overall.             Summary of Recommendations:   Continue current medication regimen. Anticipate need for zyprexa to persist for another week or so, but depends on patient behaviors.   Return to [facility] as soon as medically stable.     \"Much or all of the text in this note was generated through the use of Dragon Dictate voice to text software. Errors in spelling or words which appear to be out of contact are unintentional, may be present due having escaped editing\"           "

## 2024-12-24 NOTE — PLAN OF CARE
Summary: 1900-0730  Primary Diagnosis: HF exacerbation and Pneumonia     Orientation: A&O to self, has been irritable and agitated  Aggression Stop Light: Green and yellow at times  Activity: 2A T&R  Diet/BS Checks: Soft bites with thickened liquid, ACHS, bedtime   Tele: NA  IV Access/Drains: No PIV access, MD aware  Pain Management: PRN Tylenol and Oxycodone x1 given for L leg pain   Abnormal VS/Results: VSS, on 1L NC  Bowel/Bladder: Incontinent of B&B, purewick in place  Skin/Wounds: Scattered bruising and wound to saccrum/coccyx, mepi in place  Consults: Psychiatry and WOC  D/C Disposition: Pending  Other Info:   -On Mag and K+ replacement, recheck this AM  -Pt intermittently agitated and very irritable, IM Olanzapine 5mg given

## 2024-12-24 NOTE — PROVIDER NOTIFICATION
MD Notification    Notified Person: MD    Notified Person Name:  Bobbyzojacques Roman    Notification Date/Time: 6:34 PM 12/23/24     Notification Interaction: [employer] Messaging    Purpose of Notification: SBAR Situation: Christian Garcia Background: HF exacerbation/pneumonia Assessment: Pt is having a lot pain on tail bone. Only PRN's are IV. Could you order oral pain medication? Thank you.    Orders Received: Oxycodone PRN 5mg ordered    Comments:
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
## 2024-12-24 NOTE — PLAN OF CARE
Goal Outcome Evaluation:             12/24/24 8036-7515    Orientation: Alert to self only. Cooperative most of this shift. Can be frustrated/irritable with cares.   Aggression Stop Light: Green/yellow.  Activity: A2 lift q2h turn/repo  Diet/BS Checks: easy to chew. Good appetite. Take whole pills/water.   Tele:  N/A  IV Access/Drains: No IV access-MD aware.   Pain Management: C/o same mild pain. Managed with scheduled Tylenol.   Abnormal VS/Results: VSS on 1L. O2sat at low 90s  Bowel/Bladder: Incontinent. No BM, purewick in place  Skin/Wounds:  Scattered bruising and wound to saccrum/coccyx, mepi in place  Consults: Psych, WOC.  D/C Disposition: pending .  Other Info:     -On K, Mg protocol.   -Psych saw pt this shift.

## 2024-12-24 NOTE — PROGRESS NOTES
Care Management Follow Up    Length of Stay (days): 17    Expected Discharge Date: 12/25/2024     Concerns to be Addressed: discharge planning  Return to [facility]  Patient plan of care discussed at interdisciplinary rounds: Yes    Anticipated Discharge Disposition: Long Term Care  Disposition Comments: Return to Long Term care           Anticipated Discharge Services: None  Anticipated Discharge DME: None    Patient/family educated on Medicare website which has current facility and service quality ratings: no  Education Provided on the Discharge Plan: No  Patient/Family in Agreement with the Plan: yes    Referrals Placed by CM/SW: Post Acute Facilities  Private pay costs discussed: Not applicable    Discussed  Partnership in Safe Discharge Planning  document with patient/family: No     Handoff Completed: No, handoff not indicated or clinically appropriate    Additional Information:  Writer followed up with [facility], they are holding the bed for patient. Once patient is off sitter for 24 hours they can discharge to LTC.     Next Steps: Remove sitter    MEET BA  [facility]  INPATIENT CARE COORDINATION

## 2024-12-24 NOTE — PLAN OF CARE
Orientation: Alert to self.   Aggression Stop Light: Yellow  Activity: Ax2 with lift. Wheelchair bound-baseline  Diet/BS Checks: Easy to chew-good appetite  Tele:  N/A  IV Access/Drains: No access-MD aware  Pain Management: Denies pain  Abnormal VS/Results: VSS ex HTN. On 1L NC.  Bowel/Bladder: Incontinent B/B. Purewick in place. No BM this shift-Miralax and senna given.  Skin/Wounds: Wound on sacrum, scattered bruising and scabs.  Consults: Psych, WOC  D/C Disposition: TBD  Other Info: Pt had bed bath this shift. Pt much more pleasant today.

## 2024-12-24 NOTE — CONSULTS
"[facility]  WO Nurse Inpatient Assessment     Consulted for: Wound coccyx     Summary: patient with Non-Poa skin damage to buttock and perianal related to MASD with friction and pressure components, initial [facility] assessment 12/24     Patient History (according to provider note(s):      \"74 year old female with history of HFpEF, HTN, CVA w/ L side deficits/ataxia/impaired mobility (wheelchair bound), asthma, T2DM on insulin, gout, HLD who was admitted on 12/7/2024 with AHRF and altered mental status. \"    Assessment:      Areas visualized during today's visit: Focused: and Sacrum/coccyx    Wound location: buttock     Buttock view     Sacrum view     Last photo: 12/24  Wound due to: Moisture Associated Skin Damage (MASD) with friction and pressure components   Wound history/plan of care: found with sacral mepilex in use with diaper   Wound base:  Epidermis, Dermis, Fragile, and Serous scabbing, dry flaking skin      Palpation of the wound bed: normal      Drainage: none     Description of drainage: none     Measurements (length x width x depth, in cm): diffuse to sacrum and perianal without focal open area noted on 12/24      Tunneling: N/A     Undermining: N/A  Periwound skin: Dry/scaly      Color: normal and consistent with surrounding tissue      Temperature: normal   Odor: none  Pain: no grimacing or signs of discomfort, none  Pain interventions prior to dressing change: patient tolerated well  Treatment goal: Heal   STATUS: initial assessment  Supplies ordered: gathered, at bedside, and supplies stored on unit       Treatment Plan:     12/24/24 1232  Wound care  EVERY SHIFT        Comments: Location: buttock  Care: provided qshift by primary RN and / or PCA  1. Cleanse with each incontinence episode or at least every 2 hours with routine turns with orquidea cleanser and soft dry wipes  2. Apply Triad paste (unit stock, 800995) to buttock and perianal . No need to scrub off all white paste " "each time, wipe off debris and apply more  3. Do not apply mepilex sacral to this patients buttock  4. Do not apply diapers while in bed. Use cardinal covidien pad instead  5. Do not use diapers with purewicks, this is against both the policy and the function of the purwick (the purewick cannot function with the circulation hole occluded by a diaper)        12/24/24 1231  Skin care precautions  EFFECTIVE NOW        Comments: Pressure Injury Prevention (PIP) Plan:  If patient is declining pressure injury prevention interventions: Explore reason why and address patient's concerns, Educate on pressure injury risk and prevention intervention(s), If patient is still declining, document \"informed refusal\" , and Ensure Care team is aware ( provider, charge nurse, etc)  Mattress: Follow bed algorithm, add Low Air Loss (Air+) mattress pump if skin is very moist or constantly moist.  HOB: Maintain at or below 30 degrees, unless contraindicated  Repositioning in bed: Every 1-2 hours , Left/right positioning; avoid supine, and Raise foot of bed prior to raising head of bed, to reduce patient sliding down (shear)  Heels: Keep elevated off mattress and Pillows under calves  Protective Dressing: Triad paste to buttock, no mepilex to buttock  Chair positioning: Chair cushion (#[id number]) , Assist patient to reposition hourly, and Do NOT use a donut for sitting (this increases pressure to smaller area and creates a higher potential for injury)    If patient has a buttock pressure injury, or high risk for PI use chair cushion or SPS.  Moisture Management: Perineal cleansing /protection: Follow Incontinence Protocol, Avoid brief in bed, Clean and dry skin folds with bathing , and Moisturize dry skin  Under Devices: Inspect skin under all medical devices during skin inspection , Ensure tubes are stabilized without tension, and Ensure patient is not lying on medical devices or equipment when repositioned  Ask provider to discontinue device " when no longer needed.        Orders: Written    RECOMMEND PRIMARY TEAM ORDER: None, at this time  Education provided: importance of repositioning, plan of care, Moisture management, Hygiene, and Off-loading pressure  Discussed plan of care with: Patient and Nurse  [facility] nurse follow-up plan: weekly  Notify [facility] if wound(s) deteriorate.  Nursing to notify the Provider(s) and re-consult the [facility] Nurse if new skin concern.    DATA:     Current support surface: Standard  Standard gel mattress (Isoflex)  Containment of urine/stool: Incontinence Protocol, Incontinent pad in bed, and Suction based external urinary catheter   BMI: Body mass index is 31.84 kg/m .   Active diet order: Orders Placed This Encounter      Diet      Combination Diet Safe Tray - with utensils; Easy to Chew (level 7); Thin Liquids (level 0) (direct supervision/cues: fully upright, SLOW rate, chew thoroughly/ensure oral clearance, alternate between bites/sips every 1-2 bites, remain upright 30+ ...     Output: I/O last 3 completed shifts:  In: 720 [P.O.:720]  Out: 1000 [Urine:1000]     Labs:   Recent Labs   Lab 12/23/24  1149   HGB 9.8*   WBC 6.3     Pressure injury risk assessment:   Sensory Perception: 2-->very limited  Moisture: 3-->occasionally moist  Activity: 1-->bedfast  Mobility: 2-->very limited  Nutrition: 3-->adequate  Friction and Shear: 1-->problem  Ayan Score: 12    [place] CWOCN   1st choice: Securely message with [employer] ([facility] [employer])   (2nd option: [facility] Office Phone [phone], messages checked periodically Mon-Fri 8a-4p)

## 2024-12-25 LAB
ANION GAP SERPL CALCULATED.3IONS-SCNC: 13 MMOL/L (ref 7–15)
BUN SERPL-MCNC: 49.2 MG/DL (ref 8–23)
CALCIUM SERPL-MCNC: 9 MG/DL (ref 8.8–10.4)
CHLORIDE SERPL-SCNC: 96 MMOL/L (ref 98–107)
CREAT SERPL-MCNC: 0.77 MG/DL (ref 0.51–0.95)
EGFRCR SERPLBLD CKD-EPI 2021: 80 ML/MIN/1.73M2
ERYTHROCYTE [DISTWIDTH] IN BLOOD BY AUTOMATED COUNT: 16 % (ref 10–15)
GLUCOSE BLDC GLUCOMTR-MCNC: 169 MG/DL (ref 70–99)
GLUCOSE BLDC GLUCOMTR-MCNC: 192 MG/DL (ref 70–99)
GLUCOSE BLDC GLUCOMTR-MCNC: 202 MG/DL (ref 70–99)
GLUCOSE BLDC GLUCOMTR-MCNC: 219 MG/DL (ref 70–99)
GLUCOSE BLDC GLUCOMTR-MCNC: 228 MG/DL (ref 70–99)
GLUCOSE SERPL-MCNC: 251 MG/DL (ref 70–99)
HCO3 SERPL-SCNC: 29 MMOL/L (ref 22–29)
HCT VFR BLD AUTO: 33.7 % (ref 35–47)
HGB BLD-MCNC: 10 G/DL (ref 11.7–15.7)
MAGNESIUM SERPL-MCNC: 1.8 MG/DL (ref 1.7–2.3)
MCH RBC QN AUTO: 29 PG (ref 26.5–33)
MCHC RBC AUTO-ENTMCNC: 29.7 G/DL (ref 31.5–36.5)
MCV RBC AUTO: 98 FL (ref 78–100)
PLATELET # BLD AUTO: 193 10E3/UL (ref 150–450)
POTASSIUM SERPL-SCNC: 3.7 MMOL/L (ref 3.4–5.3)
RBC # BLD AUTO: 3.45 10E6/UL (ref 3.8–5.2)
SODIUM SERPL-SCNC: 138 MMOL/L (ref 135–145)
WBC # BLD AUTO: 5.5 10E3/UL (ref 4–11)

## 2024-12-25 PROCEDURE — 250N000013 HC RX MED GY IP 250 OP 250 PS 637: Performed by: STUDENT IN AN ORGANIZED HEALTH CARE EDUCATION/TRAINING PROGRAM

## 2024-12-25 PROCEDURE — 36415 COLL VENOUS BLD VENIPUNCTURE: CPT | Performed by: STUDENT IN AN ORGANIZED HEALTH CARE EDUCATION/TRAINING PROGRAM

## 2024-12-25 PROCEDURE — 83735 ASSAY OF MAGNESIUM: CPT | Performed by: STUDENT IN AN ORGANIZED HEALTH CARE EDUCATION/TRAINING PROGRAM

## 2024-12-25 PROCEDURE — 99233 SBSQ HOSP IP/OBS HIGH 50: CPT | Performed by: STUDENT IN AN ORGANIZED HEALTH CARE EDUCATION/TRAINING PROGRAM

## 2024-12-25 PROCEDURE — 250N000013 HC RX MED GY IP 250 OP 250 PS 637: Performed by: INTERNAL MEDICINE

## 2024-12-25 PROCEDURE — 80048 BASIC METABOLIC PNL TOTAL CA: CPT | Performed by: STUDENT IN AN ORGANIZED HEALTH CARE EDUCATION/TRAINING PROGRAM

## 2024-12-25 PROCEDURE — 250N000011 HC RX IP 250 OP 636: Performed by: STUDENT IN AN ORGANIZED HEALTH CARE EDUCATION/TRAINING PROGRAM

## 2024-12-25 PROCEDURE — 85027 COMPLETE CBC AUTOMATED: CPT | Performed by: STUDENT IN AN ORGANIZED HEALTH CARE EDUCATION/TRAINING PROGRAM

## 2024-12-25 PROCEDURE — 250N000013 HC RX MED GY IP 250 OP 250 PS 637: Performed by: PSYCHIATRY & NEUROLOGY

## 2024-12-25 PROCEDURE — 120N000001 HC R&B MED SURG/OB

## 2024-12-25 RX ADMIN — OLANZAPINE 5 MG: 5 TABLET, ORALLY DISINTEGRATING ORAL at 13:05

## 2024-12-25 RX ADMIN — CLOPIDOGREL BISULFATE 75 MG: 75 TABLET ORAL at 09:19

## 2024-12-25 RX ADMIN — SERTRALINE HYDROCHLORIDE 100 MG: 100 TABLET ORAL at 09:20

## 2024-12-25 RX ADMIN — OLANZAPINE 5 MG: 5 TABLET, ORALLY DISINTEGRATING ORAL at 17:12

## 2024-12-25 RX ADMIN — ACETAMINOPHEN 1000 MG: 500 TABLET, FILM COATED ORAL at 09:18

## 2024-12-25 RX ADMIN — ACETAMINOPHEN 1000 MG: 500 TABLET, FILM COATED ORAL at 21:55

## 2024-12-25 RX ADMIN — METOPROLOL TARTRATE 37.5 MG: 25 TABLET, FILM COATED ORAL at 21:56

## 2024-12-25 RX ADMIN — INSULIN ASPART 1 UNITS: 100 INJECTION, SOLUTION INTRAVENOUS; SUBCUTANEOUS at 22:02

## 2024-12-25 RX ADMIN — INSULIN GLARGINE 15 UNITS: 100 INJECTION, SOLUTION SUBCUTANEOUS at 22:02

## 2024-12-25 RX ADMIN — SIMVASTATIN 20 MG: 20 TABLET, FILM COATED ORAL at 21:56

## 2024-12-25 RX ADMIN — Medication 5 MG: at 21:55

## 2024-12-25 RX ADMIN — ALLOPURINOL 200 MG: 100 TABLET ORAL at 09:19

## 2024-12-25 RX ADMIN — ACETAMINOPHEN 1000 MG: 500 TABLET, FILM COATED ORAL at 15:42

## 2024-12-25 RX ADMIN — AMLODIPINE BESYLATE 10 MG: 10 TABLET ORAL at 09:19

## 2024-12-25 RX ADMIN — ENOXAPARIN SODIUM 40 MG: 40 INJECTION SUBCUTANEOUS at 17:13

## 2024-12-25 RX ADMIN — LOSARTAN POTASSIUM 50 MG: 50 TABLET, FILM COATED ORAL at 09:19

## 2024-12-25 RX ADMIN — OLANZAPINE 5 MG: 5 TABLET, ORALLY DISINTEGRATING ORAL at 21:56

## 2024-12-25 RX ADMIN — METOPROLOL TARTRATE 50 MG: 50 TABLET, FILM COATED ORAL at 09:19

## 2024-12-25 RX ADMIN — OLANZAPINE 5 MG: 5 TABLET, ORALLY DISINTEGRATING ORAL at 09:19

## 2024-12-25 RX ADMIN — SENNOSIDES AND DOCUSATE SODIUM 2 TABLET: 50; 8.6 TABLET ORAL at 09:19

## 2024-12-25 RX ADMIN — TORSEMIDE 20 MG: 20 TABLET ORAL at 09:20

## 2024-12-25 ASSESSMENT — ACTIVITIES OF DAILY LIVING (ADL)
ADLS_ACUITY_SCORE: 87
ADLS_ACUITY_SCORE: 92
ADLS_ACUITY_SCORE: 92
ADLS_ACUITY_SCORE: 89
ADLS_ACUITY_SCORE: 88
ADLS_ACUITY_SCORE: 92
ADLS_ACUITY_SCORE: 89
ADLS_ACUITY_SCORE: 92
ADLS_ACUITY_SCORE: 91
ADLS_ACUITY_SCORE: 92
ADLS_ACUITY_SCORE: 88
ADLS_ACUITY_SCORE: 92
ADLS_ACUITY_SCORE: 91

## 2024-12-25 NOTE — PLAN OF CARE
Goal Outcome Evaluation:         12/25/24 [id number]     Orientation: Alert to self only. Cooperative most of this shift. Can be irritable with cares.   Aggression Stop Light: Green/yellow.  Activity: A2 lift q2h turn/repo  Diet/BS Checks: easy to chew, thin liquids. Very good appetite. Take whole pills/water.   Tele:  N/A  IV Access/Drains: No IV access-MD aware.   Pain Management: C/o same mild pain at coccyx. Managed with scheduled Tylenol.   Abnormal VS/Results: VSS on 0.5 L . O2sat at low to mid 90s.   Bowel/Bladder: Incontinent. 2 loose BM, purewick in place.  Skin/Wounds:  Scattered bruising and wound to saccrum/coccyx. Follow POC.  Consults: Psych, WOC.  D/C Disposition: plan discharge  back to LTC tomorrow 12/26 from 1330--14:30 via stretcher.   Other Info:      -On K, Mg protocol.

## 2024-12-25 NOTE — PROGRESS NOTES
Care Management Follow Up    Length of Stay (days): 18    Expected Discharge Date: 12/26/2024     Concerns to be Addressed: discharge planning  Return to [facility]  Patient plan of care discussed at interdisciplinary rounds: Yes    Anticipated Discharge Disposition: Long Term Care  Disposition Comments: Return to Long Term care           Anticipated Discharge Services: None  Anticipated Discharge DME: None    Patient/family educated on Medicare website which has current facility and service quality ratings: no  Education Provided on the Discharge Plan: No  Patient/Family in Agreement with the Plan: yes    Referrals Placed by CM/SW: Post Acute Facilities  Private pay costs discussed: transportation costs    Discussed  Partnership in Safe Discharge Planning  document with patient/family: No     Handoff Completed: No, handoff not indicated or clinically appropriate    Additional Information:  Writer spoke with charge RN, patient has been off her sitter and would be ready for discharge. Writer spoke with admissions at [facility] and they are able to take the patient back as long as she is sitter free for 24 hours. In anticipation for discharge tomorrow writer arranged [facility] transport. Patient will discharge via stretcher due to their impulsivity and confusion. Stretcher ride has been arranged for [id number]. PCS was completed via epic.       Next Steps: Follow up with [facility], ensure medical readiness, send discharge orders.     MEET BA  [facility]  INPATIENT CARE COORDINATION
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
## 2024-12-25 NOTE — PLAN OF CARE
Orientation: Alert to self  Aggression Stop Light: Green/Yellow, pt can get agitated easily but was directable this shift   Activity: A2 lift   Diet/BS Checks: Easy to chew with thin liquids, BG ACHS with SSI  Tele:  n/a  IV Access/Drains: No IV access, MD aware   Pain Management: Scheduled tylenol   Abnormal VS/Results: VSS on 1 L NC. K and Mag recheck in AM  Bowel/Bladder: Incontinent of bowel and bladder, purewick in place   Skin/Wounds: Sacral rash CDI, scattered scabs to BUE   Consults: SW/WOC/Psych  D/C Disposition: pending LTC placement. Pt has bed hold at [facility] per  note  Other Info: Pt up in chair at beginning of shift

## 2024-12-25 NOTE — PLAN OF CARE
Goal Outcome Evaluation:    Orientation: PETEY, confused, refused to answer orientation questions.  Aggression Stop Light: yellow, irritable when providing cares  Activity: A2 T/R, lift   Diet/BS Checks: Easy to Chew, ACHS.   Tele:  n/a  IV Access/Drains: no IV access MD aware  Pain Management: denies pain  Abnormal VS/Results: VSS on 1L NC  Bowel/Bladder: incontinent of stool. Purewick in place, no output. Bladder scanned, no retention.  Skin/Wounds: Scattered bruising. Sacral wound, mepilex in place  Consults: Psyc, WOC, SW  D/C Disposition: pending placement to [facility] per . No sitter for 24 hrs before d/c   Other Info:   - sitter at bedside   - K and Mag protocol, recheck in AM

## 2024-12-26 VITALS
SYSTOLIC BLOOD PRESSURE: 117 MMHG | OXYGEN SATURATION: 93 % | WEIGHT: 191.36 LBS | HEIGHT: 65 IN | DIASTOLIC BLOOD PRESSURE: 64 MMHG | RESPIRATION RATE: 18 BRPM | TEMPERATURE: 98.2 F | HEART RATE: 66 BPM | BODY MASS INDEX: 31.88 KG/M2

## 2024-12-26 LAB
ANION GAP SERPL CALCULATED.3IONS-SCNC: 14 MMOL/L (ref 7–15)
BUN SERPL-MCNC: 54.3 MG/DL (ref 8–23)
CALCIUM SERPL-MCNC: 9.2 MG/DL (ref 8.8–10.4)
CHLORIDE SERPL-SCNC: 96 MMOL/L (ref 98–107)
CREAT SERPL-MCNC: 0.79 MG/DL (ref 0.51–0.95)
EGFRCR SERPLBLD CKD-EPI 2021: 78 ML/MIN/1.73M2
ERYTHROCYTE [DISTWIDTH] IN BLOOD BY AUTOMATED COUNT: 15.9 % (ref 10–15)
GLUCOSE BLDC GLUCOMTR-MCNC: 149 MG/DL (ref 70–99)
GLUCOSE BLDC GLUCOMTR-MCNC: 160 MG/DL (ref 70–99)
GLUCOSE BLDC GLUCOMTR-MCNC: 211 MG/DL (ref 70–99)
GLUCOSE SERPL-MCNC: 143 MG/DL (ref 70–99)
HCO3 SERPL-SCNC: 28 MMOL/L (ref 22–29)
HCT VFR BLD AUTO: 35.6 % (ref 35–47)
HGB BLD-MCNC: 10.6 G/DL (ref 11.7–15.7)
MAGNESIUM SERPL-MCNC: 1.8 MG/DL (ref 1.7–2.3)
MCH RBC QN AUTO: 28.9 PG (ref 26.5–33)
MCHC RBC AUTO-ENTMCNC: 29.8 G/DL (ref 31.5–36.5)
MCV RBC AUTO: 97 FL (ref 78–100)
PLATELET # BLD AUTO: 218 10E3/UL (ref 150–450)
POTASSIUM SERPL-SCNC: 4.1 MMOL/L (ref 3.4–5.3)
RBC # BLD AUTO: 3.67 10E6/UL (ref 3.8–5.2)
SODIUM SERPL-SCNC: 138 MMOL/L (ref 135–145)
WBC # BLD AUTO: 8.3 10E3/UL (ref 4–11)

## 2024-12-26 PROCEDURE — 85014 HEMATOCRIT: CPT | Performed by: STUDENT IN AN ORGANIZED HEALTH CARE EDUCATION/TRAINING PROGRAM

## 2024-12-26 PROCEDURE — 99232 SBSQ HOSP IP/OBS MODERATE 35: CPT | Performed by: PSYCHIATRY & NEUROLOGY

## 2024-12-26 PROCEDURE — 250N000013 HC RX MED GY IP 250 OP 250 PS 637: Performed by: STUDENT IN AN ORGANIZED HEALTH CARE EDUCATION/TRAINING PROGRAM

## 2024-12-26 PROCEDURE — 82310 ASSAY OF CALCIUM: CPT | Performed by: STUDENT IN AN ORGANIZED HEALTH CARE EDUCATION/TRAINING PROGRAM

## 2024-12-26 PROCEDURE — 250N000013 HC RX MED GY IP 250 OP 250 PS 637: Performed by: INTERNAL MEDICINE

## 2024-12-26 PROCEDURE — 36415 COLL VENOUS BLD VENIPUNCTURE: CPT | Performed by: STUDENT IN AN ORGANIZED HEALTH CARE EDUCATION/TRAINING PROGRAM

## 2024-12-26 PROCEDURE — 99239 HOSP IP/OBS DSCHRG MGMT >30: CPT | Performed by: STUDENT IN AN ORGANIZED HEALTH CARE EDUCATION/TRAINING PROGRAM

## 2024-12-26 PROCEDURE — 83735 ASSAY OF MAGNESIUM: CPT | Performed by: STUDENT IN AN ORGANIZED HEALTH CARE EDUCATION/TRAINING PROGRAM

## 2024-12-26 PROCEDURE — 250N000013 HC RX MED GY IP 250 OP 250 PS 637: Performed by: PSYCHIATRY & NEUROLOGY

## 2024-12-26 PROCEDURE — 80048 BASIC METABOLIC PNL TOTAL CA: CPT | Performed by: STUDENT IN AN ORGANIZED HEALTH CARE EDUCATION/TRAINING PROGRAM

## 2024-12-26 RX ORDER — METOPROLOL TARTRATE 37.5 MG/1
37.5 TABLET ORAL 2 TIMES DAILY
DISCHARGE
Start: 2024-12-26

## 2024-12-26 RX ORDER — OLANZAPINE 5 MG/1
5 TABLET, ORALLY DISINTEGRATING ORAL 4 TIMES DAILY
DISCHARGE
Start: 2024-12-26 | End: 2025-01-06

## 2024-12-26 RX ADMIN — ALLOPURINOL 200 MG: 100 TABLET ORAL at 08:58

## 2024-12-26 RX ADMIN — ACETAMINOPHEN 1000 MG: 500 TABLET, FILM COATED ORAL at 08:57

## 2024-12-26 RX ADMIN — AMLODIPINE BESYLATE 10 MG: 10 TABLET ORAL at 08:59

## 2024-12-26 RX ADMIN — TORSEMIDE 20 MG: 20 TABLET ORAL at 08:59

## 2024-12-26 RX ADMIN — SERTRALINE HYDROCHLORIDE 100 MG: 100 TABLET ORAL at 08:59

## 2024-12-26 RX ADMIN — CLOPIDOGREL BISULFATE 75 MG: 75 TABLET ORAL at 08:59

## 2024-12-26 RX ADMIN — OLANZAPINE 5 MG: 5 TABLET, ORALLY DISINTEGRATING ORAL at 13:32

## 2024-12-26 RX ADMIN — METOPROLOL TARTRATE 37.5 MG: 25 TABLET, FILM COATED ORAL at 08:58

## 2024-12-26 RX ADMIN — ACETAMINOPHEN 650 MG: 325 TABLET, FILM COATED ORAL at 02:25

## 2024-12-26 RX ADMIN — OLANZAPINE 5 MG: 5 TABLET, ORALLY DISINTEGRATING ORAL at 08:59

## 2024-12-26 RX ADMIN — LOSARTAN POTASSIUM 50 MG: 50 TABLET, FILM COATED ORAL at 08:58

## 2024-12-26 ASSESSMENT — ACTIVITIES OF DAILY LIVING (ADL)
ADLS_ACUITY_SCORE: 88

## 2024-12-26 NOTE — PLAN OF CARE
Orientation:  A&O to self, illogical spontaneous speech. Cooperative with cares.   Aggression Stop Light: Green/yellow  Activity: A2 T/R  Diet/BS Checks: Easy to chew. ACHS BG checks. Takes pills whole with water.   Tele:  N/a  IV Access/Drains: No IV access   Pain Management: PRN tylenol for generalized pain   Abnormal VS/Results: On 1L NC, carine at times   Bowel/Bladder: Incontinent, purewick in place, x2 BM   Skin/Wounds: Scattered bruising and scabs, wound to sacrum    Consults:   D/C Disposition: Plan to discharge back to nursing facility today via stretcher between 1:30-2:30.  Other Info:    -On K & mag protocol

## 2024-12-26 NOTE — PLAN OF CARE
Orientation: Alert to self only  Aggression Stop Light: green  Activity: Ax2 Lift  Diet/BS Checks: BS ACHS. Easy to chew diet.  Tele:  N/A  IV Access/Drains: No access-MD aware  Pain Management: Scheduled Tylenol for pain on tail bone.  Abnormal VS/Results: VSS on 1L NC.  Bowel/Bladder: Incontinent B/B. Purewick in place.  Skin/Wounds: Scattered scabs and bruising. Wound on Sacrum.  Consults: Psych, WOC  D/C Disposition: Back to [facility] today @ [id number].    Discharge Note    Patient discharged to Nursing Home via transportation service  accompanied by no family/friend .IV: Discontinued  Prescriptions sent with patient to discharge facility .   Belongings reviewed and sent with patient.   Home medications returned to patient: NA  Equipment sent with: N/A.  AVS given to EMS to give to facility.

## 2024-12-26 NOTE — CONSULTS
"[facility] Psychiatric Consult Progress Note    Interval History:   Pt seen, chart reviewed, case discussed with nursing staff and treating clinicians. Jessika Garcia is a 74 year old female with history of HFpEF, HTN, CVA w/ L side deficits/ataxia/impaired mobility (wheelchair bound), asthma, T2DM on insulin, gout, HLD who was admitted on 12/7/2024 with AHRF and altered mental status.   Acute on chronic heart failure with mildly reduced ejection fraction  Acute hypoxic respiratory failure  Inferior infarct without ischemia (stress test 12/8/24)  Hypertensive urgency: Resolved  Possible NSTEMI  Patient was seen by my colleague Francisco Javier Ramey for psych consult and a follow-up consult has been requested for discharge medications    During my interview today with the patient patient is pleasant.  She is mildly irritable when pressed for details.  She denies any active suicide ideation plan or intent.    Case discussed with bedside nurse who reports patient has not been sedated she is less irritable and more cooperative.  Patient has been tolerating the Zyprexa and has not been abusive to the staff.    Records from her long term facility  \"        Spoke with [place]  [facility] and she let me know that Jessika's baseline is moderate to sever confusion. She does get agitated and will swear, refuse things (like meals). She has not yelled, or been combative at baseline. She does tend to repeat herself. She is not very social and likes to spend her time in her room watching TV.            Review of systems:   10 point Review of Systems completed by Dr. Little, and is  is negative other than noted in the HPI     Medications:     Current Facility-Administered Medications   Medication Dose Route Frequency Provider Last Rate Last Admin    acetaminophen (TYLENOL) tablet 1,000 mg  1,000 mg Oral TID Es Wolff MD   1,000 mg at 12/26/24 0857    allopurinol (ZYLOPRIM) tablet 200 mg  200 mg Oral " Daily Tee Linares MD   200 mg at 12/26/24 0858    amLODIPine (NORVASC) tablet 10 mg  10 mg Oral BID Joslyn Reddy DO   10 mg at 12/26/24 0859    clopidogrel (PLAVIX) tablet 75 mg  75 mg Oral Daily Tee Linares MD   75 mg at 12/26/24 0859    cyanocobalamin injection 1,000 mcg  1,000 mcg Intramuscular Q7 Days Karen Olguin MD   1,000 mcg at 12/20/24 1625    enoxaparin ANTICOAGULANT (LOVENOX) injection 40 mg  40 mg Subcutaneous Q24H Soy Hernandez MD   40 mg at 12/25/24 1713    insulin aspart (NovoLOG) injection (RAPID ACTING)  1-7 Units Subcutaneous TID AC Es Wolff MD   1 Units at 12/26/24 0817    insulin aspart (NovoLOG) injection (RAPID ACTING)  1-5 Units Subcutaneous At Bedtime Es Wolff MD   1 Units at 12/25/24 2202    insulin glargine (LANTUS PEN) injection 15 Units  15 Units Subcutaneous At Bedtime Joslyn Reddy DO   15 Units at 12/25/24 2202    Lidocaine (LIDOCARE) 4 % Patch 1 patch  1 patch Transdermal Q24H Es Wolff MD   1 patch at 12/24/24 0909    losartan (COZAAR) tablet 50 mg  50 mg Oral Daily Gustavo Bose MD   50 mg at 12/26/24 0858    melatonin tablet 5 mg  5 mg Oral At Bedtime Marsha Little MD   5 mg at 12/25/24 2155    metoprolol tartrate (LOPRESSOR) half-tab 37.5 mg  37.5 mg Oral BID Soy Hernandez MD   37.5 mg at 12/26/24 0858    OLANZapine zydis (zyPREXA) ODT tab 5 mg  5 mg Oral 4x Daily Marsha Little MD   5 mg at 12/26/24 0859    polyethylene glycol (MIRALAX) Packet 17 g  17 g Oral Daily Tee Linares MD   17 g at 12/23/24 1611    psyllium (METAMUCIL/KONSYL) Packet 1 packet  1 packet Oral Daily Francisco Javier Ramey PA-C   1 packet at 12/20/24 0938    senna-docusate (SENOKOT-S/PERICOLACE) 8.6-50 MG per tablet 2 tablet  2 tablet Oral BID Es Wolff MD   2 tablet at 12/25/24 0919    sertraline (ZOLOFT) tablet 100 mg  100 mg Oral Daily Es Wolff MD    100 mg at 12/26/24 0859    simvastatin (ZOCOR) tablet 20 mg  20 mg Oral At Bedtime Tee Linares MD   20 mg at 12/25/24 2156    sodium chloride (PF) 0.9% PF flush 3 mL  3 mL Intracatheter Q8H Tee Linares MD   3 mL at 12/21/24 0929    torsemide (DEMADEX) tablet 20 mg  20 mg Oral Daily Soy Hernandez MD   20 mg at 12/26/24 0859     Current Facility-Administered Medications   Medication Dose Route Frequency Provider Last Rate Last Admin    acetaminophen (TYLENOL) tablet 650 mg  650 mg Oral Q4H PRN Tee Linares MD   650 mg at 12/26/24 0225    Or    acetaminophen (TYLENOL) Suppository 650 mg  650 mg Rectal Q4H PRN Tee Linares MD   650 mg at 12/17/24 1358    bisacodyl (DULCOLAX) suppository 10 mg  10 mg Rectal Daily PRN Emily Hickman MD   10 mg at 12/19/24 1704    calcium carbonate (TUMS) chewable tablet 1,000 mg  1,000 mg Oral 4x Daily PRN Tee Linares MD        glucose gel 15-30 g  15-30 g Oral Q15 Min PRN Es Wolff MD        Or    dextrose 50 % injection 25-50 mL  25-50 mL Intravenous Q15 Min PRN Es Wolff MD        Or    glucagon injection 1 mg  1 mg Subcutaneous Q15 Min PRN Es Wolff MD        HOLD: Caffeine containing medications 12 hours prior to the procedure   Does not apply HOLD Nakia Mandujano MD        HOLD: dipyridamole (PERSANTINE) or aspirin/dipyridamole (AGGRENOX) 48 hours prior to the procedure   Does not apply HOLD Nakia Mandujano MD        HOLD: theophylline or aminophylline 12 hours prior to the procedure   Does not apply HOLD Nakia Mandujano MD        hydrALAZINE (APRESOLINE) injection 10 mg  10 mg Intravenous Q4H PRN Gustavo Bose MD        IF patient diabetic - HOLD: ALL ORAL HYPOGLYCEMICS and include: glipizide, glyburide, glimepiride, gliclazide, metformin, any metformin containing medication, on day of the procedure   Does not apply HOLD Nakia Mandujano MD        ipratropium - albuterol 0.5 mg/2.5  "mg/3 mL (DUONEB) neb solution 3 mL  3 mL Nebulization Q6H PRN Tee Linares MD        labetalol (NORMODYNE/TRANDATE) injection 10 mg  10 mg Intravenous Q2H PRN Gustavo Bose MD        lidocaine (LMX4) cream   Topical Q1H PRN Tee Linares MD        loperamide (IMODIUM) capsule 2 mg  2 mg Oral Q12H PRN Tee Linares MD        naloxone (NARCAN) injection 0.2 mg  0.2 mg Intravenous Q2 Min PRN Emily Hickman MD        Or    naloxone (NARCAN) injection 0.4 mg  0.4 mg Intravenous Q2 Min PRN Emily Hickman MD        Or    naloxone (NARCAN) injection 0.2 mg  0.2 mg Intramuscular Q2 Min PRN Emily Hickman MD        Or    naloxone (NARCAN) injection 0.4 mg  0.4 mg Intramuscular Q2 Min PRN Emily Hickman MD        OLANZapine (zyPREXA) injection 5 mg  5 mg Intramuscular 4x Daily PRN Marsha Little MD   5 mg at 12/24/24 0019    Or    OLANZapine zydis (zyPREXA) ODT tab 5 mg  5 mg Oral 4x Daily PRN Marsha Little MD        oxyCODONE (ROXICODONE) tablet 5 mg  5 mg Oral Q4H PRN Bharathi Roman DO   5 mg at 12/24/24 0019    sodium chloride (PF) 0.9% PF flush 1-10 mL  1-10 mL Intravenous Q10 Min PRN Nakia Mandujano MD        sodium chloride (PF) 0.9% PF flush 3 mL  3 mL Intracatheter q1 min prn Tee Linares MD   3 mL at 12/21/24 0928       Mental Status Examination:   Appearance: awake, alert  Attitude:  cooperative  Eye Contact:  good  Mood:  \"fair\"  Affect:  slightly restricted  Speech:  clear, coherent  Psychomotor Behavior:  no evidence of tardive dyskinesia, dystonia, or tics  Muscle strength and tone: intact   Thought Process:  logical  Associations:  no loose associations  Thought Content:  no evidence of suicidal ideation or homicidal ideation  Insight:  limited  Judgement:  limited  Oriented to:  time, person, and place  Attention Span and Concentration:  fair  Recent and Remote Memory:  fair        Labs/Vitals:     Recent Results (from the past 24 hours)   Glucose by meter    " Collection Time: 12/25/24 12:08 PM   Result Value Ref Range    GLUCOSE BY METER POCT 202 (H) 70 - 99 mg/dL   Glucose by meter    Collection Time: 12/25/24  5:04 PM   Result Value Ref Range    GLUCOSE BY METER POCT 192 (H) 70 - 99 mg/dL   Glucose by meter    Collection Time: 12/25/24  9:28 PM   Result Value Ref Range    GLUCOSE BY METER POCT 228 (H) 70 - 99 mg/dL   Glucose by meter    Collection Time: 12/26/24  2:19 AM   Result Value Ref Range    GLUCOSE BY METER POCT 160 (H) 70 - 99 mg/dL   Glucose by meter    Collection Time: 12/26/24  8:13 AM   Result Value Ref Range    GLUCOSE BY METER POCT 149 (H) 70 - 99 mg/dL   CBC with platelets    Collection Time: 12/26/24  8:29 AM   Result Value Ref Range    WBC Count 8.3 4.0 - 11.0 10e3/uL    RBC Count 3.67 (L) 3.80 - 5.20 10e6/uL    Hemoglobin 10.6 (L) 11.7 - 15.7 g/dL    Hematocrit 35.6 35.0 - 47.0 %    MCV 97 78 - 100 fL    MCH 28.9 26.5 - 33.0 pg    MCHC 29.8 (L) 31.5 - 36.5 g/dL    RDW 15.9 (H) 10.0 - 15.0 %    Platelet Count 218 150 - 450 10e3/uL   Basic metabolic panel    Collection Time: 12/26/24  8:29 AM   Result Value Ref Range    Sodium 138 135 - 145 mmol/L    Potassium 4.1 3.4 - 5.3 mmol/L    Chloride 96 (L) 98 - 107 mmol/L    Carbon Dioxide (CO2) 28 22 - 29 mmol/L    Anion Gap 14 7 - 15 mmol/L    Urea Nitrogen 54.3 (H) 8.0 - 23.0 mg/dL    Creatinine 0.79 0.51 - 0.95 mg/dL    GFR Estimate 78 >60 mL/min/1.73m2    Calcium 9.2 8.8 - 10.4 mg/dL    Glucose 143 (H) 70 - 99 mg/dL   Magnesium    Collection Time: 12/26/24  9:29 AM   Result Value Ref Range    Magnesium 1.8 1.7 - 2.3 mg/dL     B/P: 117/64, T: 97.3, P: 66, R: 19  Most Recent 3 CBC's:  Recent Labs   Lab Test 12/26/24  0829 12/25/24  0906 12/24/24  1430   WBC 8.3 5.5 6.4   HGB 10.6* 10.0* 9.4*   MCV 97 98 96    193 198      Most Recent 3 BMP's:  Recent Labs   Lab Test 12/26/24  0829 12/26/24  0813 12/26/24  0219 12/25/24  1208 12/25/24  0906 12/24/24  1648 12/24/24  1430     --   --   --  138   --  140   POTASSIUM 4.1  --   --   --  3.7  --  4.0   CHLORIDE 96*  --   --   --  96*  --  97*   CO2 28  --   --   --  29  --  33*   BUN 54.3*  --   --   --  49.2*  --  44.2*   CR 0.79  --   --   --  0.77  --  0.73   ANIONGAP 14  --   --   --  13  --  10   DOMENICA 9.2  --   --   --  9.0  --  9.1   * 149* 160*   < > 251*   < > 237*    < > = values in this interval not displayed.     Most Recent 2 LFT's:  Recent Labs   Lab Test 12/11/24  0600 12/07/24  0954   AST 35 34   ALT 19 11   ALKPHOS 77 84   BILITOTAL <0.2 0.3     Most Recent INR's and Anticoagulation Dosing History:  Anticoagulation Dose History           No data to display              Most Recent 3 Troponin's:No lab results found.  Most Recent Cholesterol Panel:No lab results found.  Most Recent 6 Bacteria Isolates From Any Culture (See EPIC Reports for Culture Details):No lab results found.  Most Recent TSH, T4 and A1c Labs:  Recent Labs   Lab Test 12/11/24  0600 11/11/24  0612   TSH 5.50*  --    T4 0.94  --    A1C  --  8.0*       Impression:    Major Neurocognitive disorder   Patient is reportedly mildly confused at baseline, so appears to be approaching her normal level of functioning. She does not appear to be at risk of intentional self harm, and does not recall making any suicidal statements.      Prolonged hospitalization will continue to put her at risk of delirium, as the hospital environment is quite disorienting overall.       DIagnoses:    Major Neurocognitive disorder          Plan:   1. Written information given on medications. Side effects, risks, benefits reviewed.  2.  Will continue Zyprexa 5 mg 4 times daily, patient has improvement in delirium.  Patient is very cooperative sleep-wake cycles are improved.  Patient's mood was very labile it has improved.  Patient has not been sedated.  Will have hold parameters for patient if she is excessively sedated on this dose as she discharges to outpatient  3.  Will have close follow-up to start  tapering patient on an outpatient basis, we will recommend going down by 2.5 mg every week to see how patient is tolerating it  4.    Do not anticipate this will be a long term medication. Antipsychotics do carry black box warning for geriatric patient populations due to increased all cause mortality. Given her ongoing agitation which required IM administration avoiding antipsychotics is not possible. These agents are preferential to benzodiazepine, or anticholinergics.       MENTAL HEALTH RESOURCES & SERVICES:   Behavioral Healthcare Providers Scheduling  During your hospitalization, you were seen by a licensed mental health professional [facility]  for a brief mental health assessment and/or psychotherapy at [facility].  It is recommended that you follow up with your established providers (psychiatrist, mental health therapist, and/or primary care doctor - as relevant) as soon as possible. Coordinators from [facility] will be calling you in the next 24-48 hours to ensure that you have the resources you need.  You can also contact [facility] coordinators directly at [phone].     You have been scheduled for the following mental health appointments:      Date: Thursday, 1/2/2025  Time: 12:00 pm - 1:00 pm  Provider: Douglas FERNANDEZ  CNP,PMHNP,RN  Location: Located within [facility], [address] [place] [address], [address], [place] [address]  Phone: [phone]  Type: Telepsychiatry     Patient instructions  Before your appointment, you must speak with our Intake Department. Our intake team will attempt to contact you. If you do not hear from them within 24 hours, please call them at [phone] and tell them you are a [facility] referral. If you do not speak with our Intake Department and complete the necessary paperwork they send you, we cannot see you at your scheduled appointment time.  Appointment times are not guaranteed until verified with [facility] intake team.         [facility] maintains an extensive network of licensed behavioral health providers to connect patients with the services they need.  We do not charge providers a fee to participate in our referral network.  We match patients with providers based on a patient s specific needs, insurance coverage, and location.  Our first effort will be to refer you to a provider within your care system, and will utilize providers outside your care system as needed.      MENTAL HEALTH RESOURCES & SERVICES:   Behavioral Healthcare Providers Scheduling  During your hospitalization, you were seen by a licensed mental health professional through Triage and Transition sevices, Behavioral Healthcare Providers (P)  for a brief mental health assessment and/or psychotherapy at [facility].  It is recommended that you follow up with your established providers (psychiatrist, mental health therapist, and/or primary care doctor - as relevant) as soon as possible. Coordinators from [facility] will be calling you in the next 24-48 hours to ensure that you have the resources you need.  You can also contact [facility] coordinators directly at [phone].       Attestation:  Patient has been seen and evaluated by me,  Marsha Little MD

## 2024-12-26 NOTE — PLAN OF CARE
"Speech Language Therapy Discharge Summary    Reason for therapy discharge:    Discharged to long term care facility.    Progress towards therapy goal(s). See goals on Care Plan in [facility] electronic health record for goal details.  Goals not met.  Barriers to achieving goals:   discharge from facility.    Therapy recommendation(s):    Continued therapy is recommended.  Rationale/Recommendations:  Dysphagia tx, rehab for strategies, PO tolerance, ADAT regular.    \"Recommend upgrade to easy to chew solids (IDDSI 7b) and thin liquids (IDDSI 0) with direct supervision/cues: fully upright, SLOW rate, chew thoroughly/ensure oral clearance, alternate between bites/sips every 1-2 bites, remain upright 30+ minutes after PO. Downgrade back to soft/bite sized if any difficulty noted. \"    *Pt not seen by discharging therapist on this date, note written based on previous treating therapist's notes and recommendations     "

## 2024-12-26 NOTE — PROGRESS NOTES
Care Management Discharge Note    Discharge Date: 12/26/2024       Discharge Disposition: Long Term Care    Discharge Services: None    Discharge DME: None    Discharge Transportation: agency    Private pay costs discussed: transportation costs    Does the patient's insurance plan have a 3 day qualifying hospital stay waiver?  No    PAS Confirmation Code:    Patient/family educated on Medicare website which has current facility and service quality ratings: no    Education Provided on the Discharge Plan: No  Persons Notified of Discharge Plans: Facility, family, HUC, bedside nurse  Patient/Family in Agreement with the Plan: yes    Handoff Referral Completed: No, handoff not indicated or clinically appropriate    Additional Information:  Patient can discharge to [facility] today. Stretcher ride was arranged for [phone] due to patients altered mental status and impulsivity. Writer faxed orders to the facility. Writer called patients sister to update her on the discharge as well, Rosalia was very happy to hear about pt leaving and going home.     MEET BA  [facility]  INPATIENT CARE COORDINATION

## 2024-12-27 ENCOUNTER — LAB REQUISITION (OUTPATIENT)
Dept: LAB | Facility: CLINIC | Age: 74
End: 2024-12-27
Payer: COMMERCIAL

## 2024-12-27 DIAGNOSIS — I10 ESSENTIAL (PRIMARY) HYPERTENSION: ICD-10-CM
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
## 2024-12-28 ENCOUNTER — PATIENT OUTREACH (OUTPATIENT)
Dept: CARE COORDINATION | Facility: CLINIC | Age: 74
End: 2024-12-28
Payer: COMMERCIAL

## 2024-12-28 NOTE — PROGRESS NOTES
Connected Care Resource Center: [facility]    Background: Transitional Care Management program identified per system criteria and reviewed by [facility] team for possible outreach.    Assessment: Upon chart review, [facility] Team member will not proceed with patient outreach related to this episode of Transitional Care Management program due to reason below:    Patient has discharged to a Memory Care, Long-term Care, Assisted Living or Group Home where patient is receiving on-site support with their daily cares, including support with hospital follow up plan.    Plan: Transitional Care Management episode addressed appropriately per reason noted above.      Renée Ojeda  Community Health Worker  [facility]  [phone]      *Connected Care Resource Team does NOT follow patient ongoing. Referrals are identified based on internal discharge reports and the outreach is to ensure patient has an understanding of their discharge instructions.

## 2024-12-30 LAB
ANION GAP SERPL CALCULATED.3IONS-SCNC: 16 MMOL/L (ref 7–15)
BUN SERPL-MCNC: 59.4 MG/DL (ref 8–23)
CALCIUM SERPL-MCNC: 8.9 MG/DL (ref 8.8–10.4)
CHLORIDE SERPL-SCNC: 99 MMOL/L (ref 98–107)
CREAT SERPL-MCNC: 0.93 MG/DL (ref 0.51–0.95)
EGFRCR SERPLBLD CKD-EPI 2021: 64 ML/MIN/1.73M2
GLUCOSE SERPL-MCNC: 147 MG/DL (ref 70–99)
HCO3 SERPL-SCNC: 26 MMOL/L (ref 22–29)
POTASSIUM SERPL-SCNC: 4.1 MMOL/L (ref 3.4–5.3)
SODIUM SERPL-SCNC: 141 MMOL/L (ref 135–145)

## 2024-12-30 PROCEDURE — 36415 COLL VENOUS BLD VENIPUNCTURE: CPT | Mod: ORL | Performed by: NURSE PRACTITIONER

## 2024-12-30 PROCEDURE — P9604 ONE-WAY ALLOW PRORATED TRIP: HCPCS | Mod: ORL | Performed by: NURSE PRACTITIONER

## 2024-12-30 PROCEDURE — 80048 BASIC METABOLIC PNL TOTAL CA: CPT | Mod: ORL | Performed by: NURSE PRACTITIONER

## 2025-01-01 ENCOUNTER — HOSPITAL ENCOUNTER (EMERGENCY)
Facility: CLINIC | Age: 75
Discharge: NURSING, REHAB OR LONG-TERM CARE | DRG: 189 | End: 2025-01-05
Attending: EMERGENCY MEDICINE
Payer: COMMERCIAL

## 2025-01-01 ENCOUNTER — DOCUMENTATION ONLY (OUTPATIENT)
Dept: CARE COORDINATION | Facility: CLINIC | Age: 75
End: 2025-01-01

## 2025-01-01 ENCOUNTER — APPOINTMENT (OUTPATIENT)
Dept: CT IMAGING | Facility: CLINIC | Age: 75
DRG: 189 | End: 2025-01-01
Attending: EMERGENCY MEDICINE
Payer: COMMERCIAL

## 2025-01-01 ENCOUNTER — APPOINTMENT (OUTPATIENT)
Dept: GENERAL RADIOLOGY | Facility: CLINIC | Age: 75
DRG: 189 | End: 2025-01-01
Attending: EMERGENCY MEDICINE
Payer: COMMERCIAL

## 2025-01-01 ENCOUNTER — HOSPITAL ENCOUNTER (INPATIENT)
Facility: CLINIC | Age: 75
LOS: 1 days | DRG: 189 | End: 2025-01-07
Attending: EMERGENCY MEDICINE | Admitting: INTERNAL MEDICINE
Payer: COMMERCIAL

## 2025-01-01 VITALS
SYSTOLIC BLOOD PRESSURE: 155 MMHG | TEMPERATURE: 97.3 F | RESPIRATION RATE: 11 BRPM | DIASTOLIC BLOOD PRESSURE: 80 MMHG | OXYGEN SATURATION: 94 % | HEART RATE: 90 BPM

## 2025-01-01 VITALS
RESPIRATION RATE: 18 BRPM | OXYGEN SATURATION: 90 % | DIASTOLIC BLOOD PRESSURE: 48 MMHG | HEART RATE: 88 BPM | TEMPERATURE: 97.5 F | SYSTOLIC BLOOD PRESSURE: 137 MMHG

## 2025-01-01 DIAGNOSIS — R06.89 CO2 NARCOSIS: ICD-10-CM

## 2025-01-01 DIAGNOSIS — R41.0 CONFUSION: ICD-10-CM

## 2025-01-01 DIAGNOSIS — J96.02 ACUTE RESPIRATORY FAILURE WITH HYPOXIA AND HYPERCARBIA (H): ICD-10-CM

## 2025-01-01 DIAGNOSIS — I50.9 ACUTE DECOMPENSATED HEART FAILURE (H): ICD-10-CM

## 2025-01-01 DIAGNOSIS — Z51.5 END OF LIFE CARE: Primary | ICD-10-CM

## 2025-01-01 DIAGNOSIS — R79.89 ELEVATED TROPONIN: ICD-10-CM

## 2025-01-01 DIAGNOSIS — R41.82 ALTERED MENTAL STATUS, UNSPECIFIED ALTERED MENTAL STATUS TYPE: ICD-10-CM

## 2025-01-01 DIAGNOSIS — J96.01 ACUTE RESPIRATORY FAILURE WITH HYPOXIA AND HYPERCARBIA (H): ICD-10-CM

## 2025-01-01 LAB
ALBUMIN SERPL BCG-MCNC: 4 G/DL (ref 3.5–5.2)
ALBUMIN UR-MCNC: 20 MG/DL
ALP SERPL-CCNC: 76 U/L (ref 40–150)
ALT SERPL W P-5'-P-CCNC: 18 U/L (ref 0–50)
AMORPH CRY #/AREA URNS HPF: ABNORMAL /HPF
ANION GAP SERPL CALCULATED.3IONS-SCNC: 10 MMOL/L (ref 7–15)
ANION GAP SERPL CALCULATED.3IONS-SCNC: 20 MMOL/L (ref 7–15)
APPEARANCE UR: CLEAR
AST SERPL W P-5'-P-CCNC: ABNORMAL U/L
ATRIAL RATE - MUSE: 100 BPM
ATRIAL RATE - MUSE: 82 BPM
BASE EXCESS BLDV CALC-SCNC: -1 MMOL/L (ref -3–3)
BASE EXCESS BLDV CALC-SCNC: 1 MMOL/L (ref -3–3)
BASE EXCESS BLDV CALC-SCNC: 2 MMOL/L (ref -3–3)
BASOPHILS # BLD AUTO: 0 10E3/UL (ref 0–0.2)
BASOPHILS # BLD AUTO: 0.1 10E3/UL (ref 0–0.2)
BASOPHILS NFR BLD AUTO: 0 %
BASOPHILS NFR BLD AUTO: 1 %
BILIRUB SERPL-MCNC: 0.2 MG/DL
BILIRUB UR QL STRIP: NEGATIVE
BUN SERPL-MCNC: 25.8 MG/DL (ref 8–23)
BUN SERPL-MCNC: 31.4 MG/DL (ref 8–23)
CALCIUM SERPL-MCNC: 9.5 MG/DL (ref 8.8–10.4)
CALCIUM SERPL-MCNC: 9.7 MG/DL (ref 8.8–10.4)
CHLORIDE SERPL-SCNC: 102 MMOL/L (ref 98–107)
CHLORIDE SERPL-SCNC: 105 MMOL/L (ref 98–107)
COLOR UR AUTO: ABNORMAL
CREAT SERPL-MCNC: 0.58 MG/DL (ref 0.51–0.95)
CREAT SERPL-MCNC: 0.84 MG/DL (ref 0.51–0.95)
DIASTOLIC BLOOD PRESSURE - MUSE: NORMAL MMHG
DIASTOLIC BLOOD PRESSURE - MUSE: NORMAL MMHG
EGFRCR SERPLBLD CKD-EPI 2021: 72 ML/MIN/1.73M2
EGFRCR SERPLBLD CKD-EPI 2021: >90 ML/MIN/1.73M2
EOSINOPHIL # BLD AUTO: 0 10E3/UL (ref 0–0.7)
EOSINOPHIL # BLD AUTO: 0.2 10E3/UL (ref 0–0.7)
EOSINOPHIL NFR BLD AUTO: 0 %
EOSINOPHIL NFR BLD AUTO: 2 %
ERYTHROCYTE [DISTWIDTH] IN BLOOD BY AUTOMATED COUNT: 15.5 % (ref 10–15)
ERYTHROCYTE [DISTWIDTH] IN BLOOD BY AUTOMATED COUNT: 15.6 % (ref 10–15)
FLUAV RNA SPEC QL NAA+PROBE: NEGATIVE
FLUBV RNA RESP QL NAA+PROBE: NEGATIVE
GLUCOSE SERPL-MCNC: 114 MG/DL (ref 70–99)
GLUCOSE SERPL-MCNC: 238 MG/DL (ref 70–99)
GLUCOSE UR STRIP-MCNC: NEGATIVE MG/DL
HCO3 BLDV-SCNC: 29 MMOL/L (ref 21–28)
HCO3 BLDV-SCNC: 30 MMOL/L (ref 21–28)
HCO3 BLDV-SCNC: 30 MMOL/L (ref 21–28)
HCO3 SERPL-SCNC: 21 MMOL/L (ref 22–29)
HCO3 SERPL-SCNC: 29 MMOL/L (ref 22–29)
HCT VFR BLD AUTO: 36.5 % (ref 35–47)
HCT VFR BLD AUTO: 40.6 % (ref 35–47)
HGB BLD-MCNC: 11 G/DL (ref 11.7–15.7)
HGB BLD-MCNC: 12.1 G/DL (ref 11.7–15.7)
HGB UR QL STRIP: NEGATIVE
HOLD SPECIMEN: NORMAL
HYALINE CASTS: 17 /LPF
IMM GRANULOCYTES # BLD: 0.1 10E3/UL
IMM GRANULOCYTES # BLD: 0.2 10E3/UL
IMM GRANULOCYTES NFR BLD: 1 %
IMM GRANULOCYTES NFR BLD: 1 %
INTERPRETATION ECG - MUSE: NORMAL
INTERPRETATION ECG - MUSE: NORMAL
KETONES UR STRIP-MCNC: NEGATIVE MG/DL
LACTATE BLD-SCNC: 1.5 MMOL/L
LACTATE BLD-SCNC: 2.2 MMOL/L
LACTATE BLD-SCNC: 5.2 MMOL/L
LEUKOCYTE ESTERASE UR QL STRIP: ABNORMAL
LYMPHOCYTES # BLD AUTO: 1.2 10E3/UL (ref 0.8–5.3)
LYMPHOCYTES # BLD AUTO: 1.7 10E3/UL (ref 0.8–5.3)
LYMPHOCYTES NFR BLD AUTO: 21 %
LYMPHOCYTES NFR BLD AUTO: 9 %
MCH RBC QN AUTO: 29 PG (ref 26.5–33)
MCH RBC QN AUTO: 29.1 PG (ref 26.5–33)
MCHC RBC AUTO-ENTMCNC: 29.8 G/DL (ref 31.5–36.5)
MCHC RBC AUTO-ENTMCNC: 30.1 G/DL (ref 31.5–36.5)
MCV RBC AUTO: 97 FL (ref 78–100)
MCV RBC AUTO: 97 FL (ref 78–100)
MONOCYTES # BLD AUTO: 0.5 10E3/UL (ref 0–1.3)
MONOCYTES # BLD AUTO: 0.9 10E3/UL (ref 0–1.3)
MONOCYTES NFR BLD AUTO: 6 %
MONOCYTES NFR BLD AUTO: 7 %
MUCOUS THREADS #/AREA URNS LPF: PRESENT /LPF
NEUTROPHILS # BLD AUTO: 11 10E3/UL (ref 1.6–8.3)
NEUTROPHILS # BLD AUTO: 5.7 10E3/UL (ref 1.6–8.3)
NEUTROPHILS NFR BLD AUTO: 71 %
NEUTROPHILS NFR BLD AUTO: 83 %
NITRATE UR QL: NEGATIVE
NRBC # BLD AUTO: 0 10E3/UL
NRBC # BLD AUTO: 0 10E3/UL
NRBC BLD AUTO-RTO: 0 /100
NRBC BLD AUTO-RTO: 0 /100
NT-PROBNP SERPL-MCNC: 3083 PG/ML (ref 0–900)
NT-PROBNP SERPL-MCNC: 4306 PG/ML (ref 0–900)
P AXIS - MUSE: 79 DEGREES
P AXIS - MUSE: 88 DEGREES
PCO2 BLDV: 66 MM HG (ref 40–50)
PCO2 BLDV: 72 MM HG (ref 40–50)
PCO2 BLDV: 80 MM HG (ref 40–50)
PH BLDV: 7.18 [PH] (ref 7.32–7.43)
PH BLDV: 7.23 [PH] (ref 7.32–7.43)
PH BLDV: 7.27 [PH] (ref 7.32–7.43)
PH UR STRIP: 5 [PH] (ref 5–7)
PLATELET # BLD AUTO: 297 10E3/UL (ref 150–450)
PLATELET # BLD AUTO: 331 10E3/UL (ref 150–450)
PO2 BLDV: 30 MM HG (ref 25–47)
PO2 BLDV: 37 MM HG (ref 25–47)
PO2 BLDV: 57 MM HG (ref 25–47)
POTASSIUM SERPL-SCNC: 4.6 MMOL/L (ref 3.4–5.3)
POTASSIUM SERPL-SCNC: 5 MMOL/L (ref 3.4–5.3)
POTASSIUM SERPL-SCNC: 5.6 MMOL/L (ref 3.4–5.3)
PR INTERVAL - MUSE: 202 MS
PR INTERVAL - MUSE: 230 MS
PROCALCITONIN SERPL IA-MCNC: 0.48 NG/ML
PROT SERPL-MCNC: 8.1 G/DL (ref 6.4–8.3)
QRS DURATION - MUSE: 78 MS
QRS DURATION - MUSE: 80 MS
QT - MUSE: 340 MS
QT - MUSE: 364 MS
QTC - MUSE: 425 MS
QTC - MUSE: 438 MS
R AXIS - MUSE: -23 DEGREES
R AXIS - MUSE: -6 DEGREES
RBC # BLD AUTO: 3.78 10E6/UL (ref 3.8–5.2)
RBC # BLD AUTO: 4.17 10E6/UL (ref 3.8–5.2)
RBC URINE: 0 /HPF
RSV RNA SPEC NAA+PROBE: NEGATIVE
SAO2 % BLDV: 40 % (ref 70–75)
SAO2 % BLDV: 60 % (ref 70–75)
SAO2 % BLDV: 82 % (ref 70–75)
SARS-COV-2 RNA RESP QL NAA+PROBE: NEGATIVE
SODIUM SERPL-SCNC: 143 MMOL/L (ref 135–145)
SODIUM SERPL-SCNC: 144 MMOL/L (ref 135–145)
SP GR UR STRIP: 1.01 (ref 1–1.03)
SQUAMOUS EPITHELIAL: 3 /HPF
SYSTOLIC BLOOD PRESSURE - MUSE: NORMAL MMHG
SYSTOLIC BLOOD PRESSURE - MUSE: NORMAL MMHG
T AXIS - MUSE: 78 DEGREES
T AXIS - MUSE: 97 DEGREES
TROPONIN T SERPL HS-MCNC: 251 NG/L
TROPONIN T SERPL HS-MCNC: 259 NG/L
TROPONIN T SERPL HS-MCNC: 270 NG/L
TROPONIN T SERPL HS-MCNC: 367 NG/L
UROBILINOGEN UR STRIP-MCNC: NORMAL MG/DL
VENTRICULAR RATE- MUSE: 100 BPM
VENTRICULAR RATE- MUSE: 82 BPM
WBC # BLD AUTO: 13.3 10E3/UL (ref 4–11)
WBC # BLD AUTO: 8.1 10E3/UL (ref 4–11)
WBC URINE: 3 /HPF

## 2025-01-01 PROCEDURE — 120N000001 HC R&B MED SURG/OB

## 2025-01-01 PROCEDURE — 5A09357 ASSISTANCE WITH RESPIRATORY VENTILATION, LESS THAN 24 CONSECUTIVE HOURS, CONTINUOUS POSITIVE AIRWAY PRESSURE: ICD-10-PCS | Performed by: INTERNAL MEDICINE

## 2025-01-01 PROCEDURE — 258N000003 HC RX IP 258 OP 636: Performed by: EMERGENCY MEDICINE

## 2025-01-01 PROCEDURE — 84145 PROCALCITONIN (PCT): CPT | Performed by: EMERGENCY MEDICINE

## 2025-01-01 PROCEDURE — 99223 1ST HOSP IP/OBS HIGH 75: CPT | Mod: FS | Performed by: INTERNAL MEDICINE

## 2025-01-01 PROCEDURE — 36415 COLL VENOUS BLD VENIPUNCTURE: CPT | Performed by: EMERGENCY MEDICINE

## 2025-01-01 PROCEDURE — 99285 EMERGENCY DEPT VISIT HI MDM: CPT | Mod: 25

## 2025-01-01 PROCEDURE — 250N000011 HC RX IP 250 OP 636: Mod: JZ | Performed by: NURSE PRACTITIONER

## 2025-01-01 PROCEDURE — 250N000011 HC RX IP 250 OP 636: Performed by: NURSE PRACTITIONER

## 2025-01-01 PROCEDURE — 81001 URINALYSIS AUTO W/SCOPE: CPT | Performed by: EMERGENCY MEDICINE

## 2025-01-01 PROCEDURE — 250N000009 HC RX 250: Performed by: NURSE PRACTITIONER

## 2025-01-01 PROCEDURE — 250N000011 HC RX IP 250 OP 636: Performed by: EMERGENCY MEDICINE

## 2025-01-01 PROCEDURE — 96365 THER/PROPH/DIAG IV INF INIT: CPT | Mod: 59

## 2025-01-01 PROCEDURE — 82803 BLOOD GASES ANY COMBINATION: CPT

## 2025-01-01 PROCEDURE — 93005 ELECTROCARDIOGRAM TRACING: CPT

## 2025-01-01 PROCEDURE — 250N000009 HC RX 250: Performed by: EMERGENCY MEDICINE

## 2025-01-01 PROCEDURE — 99238 HOSP IP/OBS DSCHRG MGMT 30/<: CPT

## 2025-01-01 PROCEDURE — 81003 URINALYSIS AUTO W/O SCOPE: CPT | Performed by: EMERGENCY MEDICINE

## 2025-01-01 PROCEDURE — 999N000157 HC STATISTIC RCP TIME EA 10 MIN

## 2025-01-01 PROCEDURE — 94660 CPAP INITIATION&MGMT: CPT

## 2025-01-01 PROCEDURE — 80048 BASIC METABOLIC PNL TOTAL CA: CPT | Performed by: EMERGENCY MEDICINE

## 2025-01-01 PROCEDURE — 87040 BLOOD CULTURE FOR BACTERIA: CPT | Performed by: EMERGENCY MEDICINE

## 2025-01-01 PROCEDURE — 82374 ASSAY BLOOD CARBON DIOXIDE: CPT | Performed by: EMERGENCY MEDICINE

## 2025-01-01 PROCEDURE — 84132 ASSAY OF SERUM POTASSIUM: CPT | Performed by: EMERGENCY MEDICINE

## 2025-01-01 PROCEDURE — 87637 SARSCOV2&INF A&B&RSV AMP PRB: CPT | Performed by: EMERGENCY MEDICINE

## 2025-01-01 PROCEDURE — 96361 HYDRATE IV INFUSION ADD-ON: CPT

## 2025-01-01 PROCEDURE — 85004 AUTOMATED DIFF WBC COUNT: CPT | Performed by: EMERGENCY MEDICINE

## 2025-01-01 PROCEDURE — 87088 URINE BACTERIA CULTURE: CPT | Performed by: EMERGENCY MEDICINE

## 2025-01-01 PROCEDURE — 85025 COMPLETE CBC W/AUTO DIFF WBC: CPT | Performed by: EMERGENCY MEDICINE

## 2025-01-01 PROCEDURE — 99207 PR NO BILLABLE SERVICE THIS VISIT: CPT | Performed by: INTERNAL MEDICINE

## 2025-01-01 PROCEDURE — 99207 PR APP CREDIT; MD BILLING SHARED VISIT: CPT | Performed by: NURSE PRACTITIONER

## 2025-01-01 PROCEDURE — 70450 CT HEAD/BRAIN W/O DYE: CPT

## 2025-01-01 PROCEDURE — 71275 CT ANGIOGRAPHY CHEST: CPT

## 2025-01-01 PROCEDURE — 84484 ASSAY OF TROPONIN QUANT: CPT | Performed by: EMERGENCY MEDICINE

## 2025-01-01 PROCEDURE — 83880 ASSAY OF NATRIURETIC PEPTIDE: CPT | Performed by: EMERGENCY MEDICINE

## 2025-01-01 PROCEDURE — 87186 SC STD MICRODIL/AGAR DIL: CPT | Performed by: EMERGENCY MEDICINE

## 2025-01-01 PROCEDURE — 250N000011 HC RX IP 250 OP 636: Mod: JZ | Performed by: HOSPITALIST

## 2025-01-01 PROCEDURE — 85041 AUTOMATED RBC COUNT: CPT | Performed by: EMERGENCY MEDICINE

## 2025-01-01 PROCEDURE — 84155 ASSAY OF PROTEIN SERUM: CPT | Performed by: EMERGENCY MEDICINE

## 2025-01-01 PROCEDURE — 71046 X-RAY EXAM CHEST 2 VIEWS: CPT

## 2025-01-01 RX ORDER — ONDANSETRON 4 MG/1
4 TABLET, ORALLY DISINTEGRATING ORAL EVERY 6 HOURS PRN
Status: DISCONTINUED | OUTPATIENT
Start: 2025-01-01 | End: 2025-01-01 | Stop reason: HOSPADM

## 2025-01-01 RX ORDER — NALOXONE HYDROCHLORIDE 0.4 MG/ML
0.4 INJECTION, SOLUTION INTRAMUSCULAR; INTRAVENOUS; SUBCUTANEOUS
Status: DISCONTINUED | OUTPATIENT
Start: 2025-01-01 | End: 2025-01-01

## 2025-01-01 RX ORDER — MINERAL OIL/HYDROPHIL PETROLAT
OINTMENT (GRAM) TOPICAL
Status: DISCONTINUED | OUTPATIENT
Start: 2025-01-01 | End: 2025-01-01 | Stop reason: HOSPADM

## 2025-01-01 RX ORDER — LORAZEPAM 2 MG/ML
1 INJECTION INTRAMUSCULAR
Status: DISCONTINUED | OUTPATIENT
Start: 2025-01-01 | End: 2025-01-01 | Stop reason: HOSPADM

## 2025-01-01 RX ORDER — NALOXONE HYDROCHLORIDE 0.4 MG/ML
0.1 INJECTION, SOLUTION INTRAMUSCULAR; INTRAVENOUS; SUBCUTANEOUS
Status: DISCONTINUED | OUTPATIENT
Start: 2025-01-01 | End: 2025-01-01 | Stop reason: HOSPADM

## 2025-01-01 RX ORDER — SERTRALINE HYDROCHLORIDE 25 MG/1
75 TABLET, FILM COATED ORAL DAILY
COMMUNITY

## 2025-01-01 RX ORDER — FUROSEMIDE 10 MG/ML
60 INJECTION INTRAMUSCULAR; INTRAVENOUS ONCE
Status: COMPLETED | OUTPATIENT
Start: 2025-01-01 | End: 2025-01-01

## 2025-01-01 RX ORDER — AMOXICILLIN 250 MG
2 CAPSULE ORAL 2 TIMES DAILY PRN
Status: DISCONTINUED | OUTPATIENT
Start: 2025-01-01 | End: 2025-01-01 | Stop reason: HOSPADM

## 2025-01-01 RX ORDER — AMOXICILLIN 250 MG
1 CAPSULE ORAL 2 TIMES DAILY PRN
Status: DISCONTINUED | OUTPATIENT
Start: 2025-01-01 | End: 2025-01-01 | Stop reason: HOSPADM

## 2025-01-01 RX ORDER — MIRTAZAPINE 7.5 MG/1
7.5 TABLET, FILM COATED ORAL AT BEDTIME
Status: DISCONTINUED | OUTPATIENT
Start: 2025-01-01 | End: 2025-01-01 | Stop reason: HOSPADM

## 2025-01-01 RX ORDER — CALCIUM CARBONATE 500 MG/1
1000 TABLET, CHEWABLE ORAL 4 TIMES DAILY PRN
Status: DISCONTINUED | OUTPATIENT
Start: 2025-01-01 | End: 2025-01-01 | Stop reason: HOSPADM

## 2025-01-01 RX ORDER — IOPAMIDOL 755 MG/ML
71 INJECTION, SOLUTION INTRAVASCULAR ONCE
Status: COMPLETED | OUTPATIENT
Start: 2025-01-01 | End: 2025-01-01

## 2025-01-01 RX ORDER — PROCHLORPERAZINE MALEATE 5 MG/1
5 TABLET ORAL EVERY 6 HOURS PRN
Status: DISCONTINUED | OUTPATIENT
Start: 2025-01-01 | End: 2025-01-01 | Stop reason: HOSPADM

## 2025-01-01 RX ORDER — SENNOSIDES 8.6 MG
1 TABLET ORAL 2 TIMES DAILY PRN
Status: DISCONTINUED | OUTPATIENT
Start: 2025-01-01 | End: 2025-01-01 | Stop reason: HOSPADM

## 2025-01-01 RX ORDER — ONDANSETRON 2 MG/ML
4 INJECTION INTRAMUSCULAR; INTRAVENOUS EVERY 6 HOURS PRN
Status: DISCONTINUED | OUTPATIENT
Start: 2025-01-01 | End: 2025-01-01 | Stop reason: HOSPADM

## 2025-01-01 RX ORDER — CARBOXYMETHYLCELLULOSE SODIUM 5 MG/ML
1-2 SOLUTION/ DROPS OPHTHALMIC
Status: DISCONTINUED | OUTPATIENT
Start: 2025-01-01 | End: 2025-01-01 | Stop reason: HOSPADM

## 2025-01-01 RX ORDER — OLANZAPINE 2.5 MG/1
2.5 TABLET, FILM COATED ORAL AT BEDTIME
COMMUNITY

## 2025-01-01 RX ORDER — MORPHINE SULFATE 2 MG/ML
2 INJECTION, SOLUTION INTRAMUSCULAR; INTRAVENOUS
Status: DISCONTINUED | OUTPATIENT
Start: 2025-01-01 | End: 2025-01-01 | Stop reason: HOSPADM

## 2025-01-01 RX ORDER — NALOXONE HYDROCHLORIDE 0.4 MG/ML
0.2 INJECTION, SOLUTION INTRAMUSCULAR; INTRAVENOUS; SUBCUTANEOUS
Status: DISCONTINUED | OUTPATIENT
Start: 2025-01-01 | End: 2025-01-01

## 2025-01-01 RX ORDER — OLANZAPINE 2.5 MG/1
2.5 TABLET, FILM COATED ORAL AT BEDTIME
Status: DISCONTINUED | OUTPATIENT
Start: 2025-01-01 | End: 2025-01-01 | Stop reason: HOSPADM

## 2025-01-01 RX ORDER — LIDOCAINE 40 MG/G
CREAM TOPICAL
Status: DISCONTINUED | OUTPATIENT
Start: 2025-01-01 | End: 2025-01-01 | Stop reason: HOSPADM

## 2025-01-01 RX ORDER — LORAZEPAM 1 MG/1
1 TABLET ORAL
Status: DISCONTINUED | OUTPATIENT
Start: 2025-01-01 | End: 2025-01-01 | Stop reason: HOSPADM

## 2025-01-01 RX ORDER — ATROPINE SULFATE 10 MG/ML
2 SOLUTION/ DROPS OPHTHALMIC EVERY 4 HOURS PRN
Status: DISCONTINUED | OUTPATIENT
Start: 2025-01-01 | End: 2025-01-01 | Stop reason: HOSPADM

## 2025-01-01 RX ORDER — GLYCOPYRROLATE 0.2 MG/ML
0.2 INJECTION, SOLUTION INTRAMUSCULAR; INTRAVENOUS EVERY 8 HOURS PRN
Status: DISCONTINUED | OUTPATIENT
Start: 2025-01-01 | End: 2025-01-01 | Stop reason: HOSPADM

## 2025-01-01 RX ORDER — NALOXONE HYDROCHLORIDE 0.4 MG/ML
0.2 INJECTION, SOLUTION INTRAMUSCULAR; INTRAVENOUS; SUBCUTANEOUS
Status: DISCONTINUED | OUTPATIENT
Start: 2025-01-01 | End: 2025-01-01 | Stop reason: HOSPADM

## 2025-01-01 RX ORDER — PIPERACILLIN SODIUM, TAZOBACTAM SODIUM 4; .5 G/20ML; G/20ML
4.5 INJECTION, POWDER, LYOPHILIZED, FOR SOLUTION INTRAVENOUS ONCE
Status: COMPLETED | OUTPATIENT
Start: 2025-01-01 | End: 2025-01-01

## 2025-01-01 RX ORDER — BISACODYL 10 MG
10 SUPPOSITORY, RECTAL RECTAL
Status: DISCONTINUED | OUTPATIENT
Start: 2025-01-09 | End: 2025-01-01 | Stop reason: HOSPADM

## 2025-01-01 RX ORDER — MORPHINE SULFATE 2 MG/ML
1 INJECTION, SOLUTION INTRAMUSCULAR; INTRAVENOUS
Status: DISCONTINUED | OUTPATIENT
Start: 2025-01-01 | End: 2025-01-01

## 2025-01-01 RX ADMIN — LORAZEPAM 1 MG: 2 INJECTION INTRAMUSCULAR; INTRAVENOUS at 13:16

## 2025-01-01 RX ADMIN — LORAZEPAM 1 MG: 2 INJECTION INTRAMUSCULAR; INTRAVENOUS at 05:00

## 2025-01-01 RX ADMIN — MORPHINE SULFATE 1 MG: 2 INJECTION, SOLUTION INTRAMUSCULAR; INTRAVENOUS at 01:31

## 2025-01-01 RX ADMIN — MORPHINE SULFATE 1 MG: 2 INJECTION, SOLUTION INTRAMUSCULAR; INTRAVENOUS at 18:11

## 2025-01-01 RX ADMIN — MORPHINE SULFATE 1 MG: 2 INJECTION, SOLUTION INTRAMUSCULAR; INTRAVENOUS at 10:17

## 2025-01-01 RX ADMIN — MORPHINE SULFATE 1 MG: 2 INJECTION, SOLUTION INTRAMUSCULAR; INTRAVENOUS at 23:46

## 2025-01-01 RX ADMIN — LORAZEPAM 1 MG: 2 INJECTION INTRAMUSCULAR; INTRAVENOUS at 01:31

## 2025-01-01 RX ADMIN — MORPHINE SULFATE 1 MG: 2 INJECTION, SOLUTION INTRAMUSCULAR; INTRAVENOUS at 10:59

## 2025-01-01 RX ADMIN — MORPHINE SULFATE 2 MG: 2 INJECTION, SOLUTION INTRAMUSCULAR; INTRAVENOUS at 09:19

## 2025-01-01 RX ADMIN — LORAZEPAM 1 MG: 2 INJECTION INTRAMUSCULAR; INTRAVENOUS at 22:42

## 2025-01-01 RX ADMIN — MORPHINE SULFATE 2 MG: 2 INJECTION, SOLUTION INTRAMUSCULAR; INTRAVENOUS at 03:23

## 2025-01-01 RX ADMIN — LORAZEPAM 1 MG: 2 INJECTION INTRAMUSCULAR; INTRAVENOUS at 19:37

## 2025-01-01 RX ADMIN — MORPHINE SULFATE 1 MG: 2 INJECTION, SOLUTION INTRAMUSCULAR; INTRAVENOUS at 19:56

## 2025-01-01 RX ADMIN — FUROSEMIDE 60 MG: 10 INJECTION, SOLUTION INTRAMUSCULAR; INTRAVENOUS at 09:44

## 2025-01-01 RX ADMIN — LORAZEPAM 1 MG: 2 INJECTION INTRAMUSCULAR; INTRAVENOUS at 16:32

## 2025-01-01 RX ADMIN — GLYCOPYRROLATE 0.2 MG: 0.2 INJECTION, SOLUTION INTRAMUSCULAR; INTRAVENOUS at 03:23

## 2025-01-01 RX ADMIN — SODIUM CHLORIDE 1000 ML: 9 INJECTION, SOLUTION INTRAVENOUS at 05:47

## 2025-01-01 RX ADMIN — MORPHINE SULFATE 2 MG: 2 INJECTION, SOLUTION INTRAMUSCULAR; INTRAVENOUS at 07:18

## 2025-01-01 RX ADMIN — MORPHINE SULFATE 1 MG: 2 INJECTION, SOLUTION INTRAMUSCULAR; INTRAVENOUS at 18:51

## 2025-01-01 RX ADMIN — IOPAMIDOL 71 ML: 755 INJECTION, SOLUTION INTRAVENOUS at 08:01

## 2025-01-01 RX ADMIN — MORPHINE SULFATE 2 MG: 2 INJECTION, SOLUTION INTRAMUSCULAR; INTRAVENOUS at 07:51

## 2025-01-01 RX ADMIN — MORPHINE SULFATE 2 MG: 2 INJECTION, SOLUTION INTRAMUSCULAR; INTRAVENOUS at 06:25

## 2025-01-01 RX ADMIN — ATROPINE SULFATE 2 DROP: 10 SOLUTION/ DROPS OPHTHALMIC at 07:18

## 2025-01-01 RX ADMIN — LORAZEPAM 1 MG: 2 INJECTION INTRAMUSCULAR; INTRAVENOUS at 10:17

## 2025-01-01 RX ADMIN — PIPERACILLIN AND TAZOBACTAM 4.5 G: 4; .5 INJECTION, POWDER, FOR SOLUTION INTRAVENOUS at 05:56

## 2025-01-01 RX ADMIN — SODIUM CHLORIDE 94 ML: 9 INJECTION, SOLUTION INTRAVENOUS at 08:01

## 2025-01-01 RX ADMIN — MORPHINE SULFATE 1 MG: 2 INJECTION, SOLUTION INTRAMUSCULAR; INTRAVENOUS at 22:59

## 2025-01-01 ASSESSMENT — ACTIVITIES OF DAILY LIVING (ADL)
ADLS_ACUITY_SCORE: 61
ADLS_ACUITY_SCORE: 83
ADLS_ACUITY_SCORE: 61
ADLS_ACUITY_SCORE: 83
ADLS_ACUITY_SCORE: 83
ADLS_ACUITY_SCORE: 61
ADLS_ACUITY_SCORE: 83
ADLS_ACUITY_SCORE: 61
ADLS_ACUITY_SCORE: 83
ADLS_ACUITY_SCORE: 83
ADLS_ACUITY_SCORE: 61
ADLS_ACUITY_SCORE: 83
ADLS_ACUITY_SCORE: 61
ADLS_ACUITY_SCORE: 83
ADLS_ACUITY_SCORE: 61
ADLS_ACUITY_SCORE: 83
ADLS_ACUITY_SCORE: 61
ADLS_ACUITY_SCORE: 83
ADLS_ACUITY_SCORE: 61

## 2025-01-01 ASSESSMENT — COLUMBIA-SUICIDE SEVERITY RATING SCALE - C-SSRS
2. HAVE YOU ACTUALLY HAD ANY THOUGHTS OF KILLING YOURSELF IN THE PAST MONTH?: NO
6. HAVE YOU EVER DONE ANYTHING, STARTED TO DO ANYTHING, OR PREPARED TO DO ANYTHING TO END YOUR LIFE?: NO
1. IN THE PAST MONTH, HAVE YOU WISHED YOU WERE DEAD OR WISHED YOU COULD GO TO SLEEP AND NOT WAKE UP?: NO

## 2025-01-02 ENCOUNTER — DOCUMENTATION ONLY (OUTPATIENT)
Dept: OTHER | Facility: CLINIC | Age: 75
End: 2025-01-02
Payer: COMMERCIAL

## 2025-01-05 NOTE — ED PROVIDER NOTES
Emergency Department Note      History of Present Illness     Chief Complaint   Altered Mental Status      HPI   Jessika Garcia is a 74 year old female with history of TBI who presents with EMS for evaluation of an altered mental status. Patient history is limited due to altered mental status. EMS reports that patient was brought in from her transitional care unit with increased delirium which her family believes is the result of a UTI. Patient denies experiencing any chest pain, abdominal pain, or shortness of breath.       Independent Historian   EMS as detailed above.    Review of External Notes   Reviewed discharge summary from 12/26/24    Past Medical History     Medical History and Problem List   Uterine cancer  Asthma  Hypertension  Hyperlipidemia  Cerebral infarction  Ataxia  Microscopic hematuria   Chronic gout  Severe obesity  Type 2 diabetes  Completed stroke  CHF  Patellar bursitis, left  TBI   Left great toe amputation  Peripheral vascular disease  Urinary incontinence  Knee contusion      Medications   Zyloprim  Amlodipine  Plavix  Cyanocobalamin  Robitussin  Novolog  Lantus  Duoneb  Imodium  Losartan  Metformin  Metoprolol  Remeron  Zyprexa  Senokot  Zoloft  Zocor  Demadex       Surgical History   Total abdominal hysterectomy  Cholecystectomy  ORIF hip fracture  Quadriceps tendon rupture repair  [facility] transesophageal echo with bubble study complete with contrast       Physical Exam     Patient Vitals for the past 24 hrs:   BP Temp Temp src Pulse Resp SpO2   01/05/25 2030 (!) 180/87 -- -- 95 -- --   01/05/25 1930 (!) 144/71 -- -- 85 -- --   01/05/25 1900 (!) 146/68 -- -- 82 -- --   01/05/25 1800 (!) 153/79 -- -- 80 18 93 %   01/05/25 1700 (!) 144/61 -- -- 77 18 94 %   01/05/25 1618 (!) 152/60 97.5  F (36.4  C) Oral 70 18 96 %     Physical Exam  General: No acute distress  Head: No obvious trauma to head.  Ears, Nose, Throat:  External ears normal.  Nose normal.  No pharyngeal erythema, swelling or  exudate.  Midline uvula. Dry mucus membranes.   Eyes:  Conjunctivae clear.   Neck: Normal range of motion.  Neck supple.   CV: Regular rate and rhythm.  No murmurs.      Respiratory: Effort normal and breath sounds normal.  No wheezing or crackles. 4 Liters O2 via nasal cannula.  Gastrointestinal: Soft.  No distension. There is no tenderness.  There is no rigidity, no rebound and no guarding.   Musculoskeletal: Normal range of motion.  Non tender extremities to palpations. No lower extremity edema  Neuro: Alert. Moving all extremities appropriately.  Normal speech.    Skin: Skin is warm and dry.  No rash noted.   Psych: Normal mood and affect. Behavior is normal.       Diagnostics     Lab Results   Labs Ordered and Resulted from Time of ED Arrival to Time of ED Departure   BASIC METABOLIC PANEL - Abnormal       Result Value    Sodium 144      Potassium 4.6      Chloride 105      Carbon Dioxide (CO2) 29      Anion Gap 10      Urea Nitrogen 25.8 (*)     Creatinine 0.58      GFR Estimate >90      Calcium 9.5      Glucose 114 (*)    TROPONIN T, HIGH SENSITIVITY - Abnormal    Troponin T, High Sensitivity 251 (*)    ROUTINE UA WITH MICROSCOPIC REFLEX TO CULTURE - Abnormal    Color Urine Straw      Appearance Urine Clear      Glucose Urine Negative      Bilirubin Urine Negative      Ketones Urine Negative      Specific Gravity Urine 1.011      Blood Urine Negative      pH Urine 5.0      Protein Albumin Urine 20 (*)     Urobilinogen Urine Normal      Nitrite Urine Negative      Leukocyte Esterase Urine Small (*)     Mucus Urine Present (*)     Amorphous Crystals Urine Few (*)     RBC Urine 0      WBC Urine 3      Squamous Epithelials Urine 3 (*)     Hyaline Casts Urine 17 (*)    NT PROBNP INPATIENT - Abnormal    N terminal Pro BNP Inpatient 4,306 (*)    CBC WITH PLATELETS AND DIFFERENTIAL - Abnormal    WBC Count 8.1      RBC Count 3.78 (*)     Hemoglobin 11.0 (*)     Hematocrit 36.5      MCV 97      MCH 29.1      MCHC 30.1  (*)     RDW 15.5 (*)     Platelet Count 297      % Neutrophils 71      % Lymphocytes 21      % Monocytes 6      % Eosinophils 2      % Basophils 0      % Immature Granulocytes 1      NRBCs per 100 WBC 0      Absolute Neutrophils 5.7      Absolute Lymphocytes 1.7      Absolute Monocytes 0.5      Absolute Eosinophils 0.2      Absolute Basophils 0.0      Absolute Immature Granulocytes 0.1      Absolute NRBCs 0.0     TROPONIN T, HIGH SENSITIVITY - Abnormal    Troponin T, High Sensitivity 259 (*)    TROPONIN T, HIGH SENSITIVITY       Imaging   Chest XR,  PA & LAT   Final Result   IMPRESSION: Cardiomegaly. Central pulmonary vascular congestion similar to the prior study. Small left pleural effusion and left basilar consolidation, unchanged. Subsegmental atelectasis right base.           EKG   ECG results from 01/05/25   EKG 12 lead     Value    Systolic Blood Pressure     Diastolic Blood Pressure     Ventricular Rate 82    Atrial Rate 82    CA Interval 202    QRS Duration 78        QTc 425    P Axis 79    R AXIS -6    T Axis 78    Interpretation ECG      Sinus rhythm  Nonspecific ST and T wave abnormality  When compared with ECG of 07-Dec-2024 20:36,  Heart rate decreased by 31    Read by Derrick Talavera MD at 1825           Independent Interpretation   CXR: Small left pleural effusion, stable from prior.    ED Course      Medications Administered   Medications - No data to display    Procedures   Procedures     Discussion of Management   None    ED Course   ED Course as of 01/05/25 2129   Sun Jan 05, 2025   1636 I obtained patient history and performed a physical exam.        Additional Documentation  None    Medical Decision Making / Diagnosis     CMS Diagnoses: None    MIPS       None    MDM   Jessika Garcia is a 74 year old female presenting from her nursing facility with altered mental status.  Patient is unable to provide a reasonable history.  The patient's family was concerned that she may have a UTI.  Chest  x-ray shows a small pleural effusion, but this is stable from previous.  Patient is hemodynamically stable.  Is elevated but stable on 2-hour recheck.  Based on chart review, she has had significantly elevated troponin previously, and her current level is slightly lower than previous levels.  She denies any chest pain, and EKG shows no ischemic changes. UA is not concerning for UTI.  Urine culture is currently in process.  Nursing staff calls the patient's facility, they state that the patient appears to be acting at her baseline mentation.  They are willing to accept the patient back to their facility.  She remains in stable condition and is discharged back to her facility.    Disposition   The patient was discharged.     Diagnosis     ICD-10-CM    1. Confusion  R41.0            Discharge Medications   New Prescriptions    No medications on file         Scribe Disclosure:  I, Nargis Stuart, am serving as a scribe at 4:33 PM on 1/5/2025 to document services personally performed by Derrick Talavera MD based on my observations and the provider's statements to me.        Derrick Talavera MD  01/05/25 1677

## 2025-01-05 NOTE — ED TRIAGE NOTES
Brought in by ambulance from [facility] for increased delirium. Per EMS patient uses a [facility] to transfer and is more altered than normal per family. Family believes the increased delirium is from a UTI.

## 2025-01-05 NOTE — ED NOTES
Bed: ED09  Expected date:   Expected time:   Means of arrival:   Comments:  [place] [id number] [id number] uti eta 7

## 2025-01-06 PROBLEM — R79.89 ELEVATED TROPONIN: Status: ACTIVE | Noted: 2025-01-01

## 2025-01-06 PROBLEM — J96.01 ACUTE RESPIRATORY FAILURE WITH HYPOXIA AND HYPERCARBIA (H): Status: ACTIVE | Noted: 2025-01-01

## 2025-01-06 PROBLEM — I50.9 ACUTE DECOMPENSATED HEART FAILURE (H): Status: ACTIVE | Noted: 2025-01-01

## 2025-01-06 PROBLEM — R41.82 ALTERED MENTAL STATUS, UNSPECIFIED ALTERED MENTAL STATUS TYPE: Status: ACTIVE | Noted: 2025-01-01

## 2025-01-06 PROBLEM — J96.02 ACUTE RESPIRATORY FAILURE WITH HYPOXIA AND HYPERCARBIA (H): Status: ACTIVE | Noted: 2025-01-01

## 2025-01-06 PROBLEM — R06.89 CO2 NARCOSIS: Status: ACTIVE | Noted: 2025-01-01

## 2025-01-06 NOTE — PROGRESS NOTES
RECEIVING UNIT ED HANDOFF REVIEW    ED Nurse Handoff Report was reviewed by: Solis Ceja RN on January 6, 2025 at 5:49 PM

## 2025-01-06 NOTE — PROGRESS NOTES
[facility]  _________________________________________________________________     [facility] 24[facility]7 Contact Number: [phone]    - Providers: Please contact [facility] with changes in orders or clinical plan of care   - Nursing: Please contact [facility] with significant changes in patient condition     Hospice will notify the care team (including the hospitalist) to confirm date of inpatient hospice (GIP) admission.    New Epic encounter will not be created until hospice completes admission.     Received, thank you for the referral. We will work on sending someone to meet with pt/family

## 2025-01-06 NOTE — ED NOTES
Bed: ED22  Expected date: 1/6/25  Expected time: 5:00 AM  Means of arrival: Ambulance  Comments:  [address] [id number] altered

## 2025-01-06 NOTE — DISCHARGE INSTRUCTIONS
Continue taking all your medication as prescribed.  If your urine sample returns positive for any infection, we will call you to inform you and prescribe antibiotics.  We recommend they follow-up with your primary care provider.  Please return the emergency department if you develop any new or concerning symptoms.

## 2025-01-06 NOTE — ED NOTES
Hospice reports that they cannot admit pt on hospice status without family signing for the pt. Staff have been unable to get a hold of the pt's sister.  [phone] [facility].

## 2025-01-06 NOTE — PHARMACY-ADMISSION MEDICATION HISTORY
Pharmacist Admission Medication History    Admission medication history is complete. The information provided in this note is only as accurate as the sources available at the time of the update.    Information Source(s): Facility (U/NH/) medication list/MAR via N/A - Rashi Antoine      Changes made to PTA medication list:  Added: None  Deleted: None  Changed: olanzapine, sertraline    Allergies reviewed with patient and updates made in EHR: no    Medication History Completed By: Gini Welsh RPH 1/6/2025 8:35 AM    PTA Med List   Medication Sig Last Dose/Taking    acetaminophen (TYLENOL) 325 MG tablet Take 2 tablets (650 mg) by mouth every 4 hours as needed for mild pain or other (and adjunct with moderate or severe pain or per patient request). Taking As Needed    acetaminophen (TYLENOL) 500 MG tablet Take 1,000 mg by mouth 3 times daily. 1/5/2025    allopurinol (ZYLOPRIM) 100 MG tablet Take 200 mg by mouth daily. 1/5/2025    amLODIPine (NORVASC) 10 MG tablet Take 10 mg by mouth 2 times daily. 1/5/2025    clopidogrel (PLAVIX) 75 MG tablet Take 75 mg by mouth daily. 1/5/2025    cyanocobalamin (CYANOCOBALAMIN) 1000 mcg/mL injection Inject 1 mL (1,000 mcg) into the muscle every 7 days. Past Week    glucose 40 % (400 mg/mL) gel Take 15-30 g by mouth every 15 minutes as needed for low blood sugar. Taking As Needed    guaiFENesin (ROBITUSSIN) 20 mg/mL liquid Take 5 mLs by mouth every 4 hours as needed for cough. Taking As Needed    HM LIDOCAINE PATCH EX Externally apply 1 patch topically daily. Remove every night 1/5/2025    insulin aspart (NOVOLOG FLEXPEN) 100 UNIT/ML pen Inject subcutaneously 3 times daily (with meals). Sliding scale:  If 250-300 = 2 units; If 301-350 = 4 units; If 351+ = 6 units 1/5/2025    insulin glargine (LANTUS PEN) 100 UNIT/ML pen Inject 15 Units subcutaneously at bedtime. 1/5/2025    ipratropium - albuterol 0.5 mg/2.5 mg/3 mL (DUONEB) 0.5-2.5 (3) MG/3ML neb solution Take 1 vial (3 mLs) by  nebulization every 6 hours as needed for wheezing. Taking As Needed    loperamide (IMODIUM) 2 MG capsule Take 2 mg by mouth every 12 hours as needed for diarrhea. Taking As Needed    losartan (COZAAR) 50 MG tablet Take 1 tablet (50 mg) by mouth daily. 1/5/2025    melatonin 5 MG tablet Take 1 tablet (5 mg) by mouth at bedtime. 1/5/2025    metFORMIN (GLUCOPHAGE) 500 MG tablet Take 1,000 mg by mouth 2 times daily (with meals). 1/5/2025    metoprolol tartrate 37.5 MG TABS Take 37.5 mg by mouth 2 times daily. 1/5/2025    mirtazapine (REMERON) 7.5 MG tablet Take 1 tablet (7.5 mg) by mouth at bedtime. 1/5/2025    OLANZapine (ZYPREXA) 2.5 MG tablet Take 2.5 mg by mouth at bedtime. X 7days, 1/2/25-1/9/25 1/5/2025    psyllium (METAMUCIL/KONSYL) Packet Take 1 packet by mouth daily. 1/5/2025    senna-docusate (SENOKOT-S/PERICOLACE) 8.6-50 MG tablet Take 1 tablet by mouth 2 times daily. 1/5/2025    sertraline (ZOLOFT) 25 MG tablet Take 75 mg by mouth daily. 1/5/2025    simvastatin (ZOCOR) 20 MG tablet Take 20 mg by mouth at bedtime. 1/5/2025    torsemide (DEMADEX) 20 MG tablet Take 20 mg by mouth daily. 1/5/2025

## 2025-01-06 NOTE — ED NOTES
[facility]  ED Nurse Handoff Report    ED Chief complaint: Altered Mental Status      ED Diagnosis:   Final diagnoses:   None       Code Status: to be addressed    Allergies: No Known Allergies    Patient Story: BIBA from facility due to altered mental state,came yesterday with the same complaint but sent home and at 0400 patient was moaning.  Hypoxic as reported -k/c copd and chf on 1L/min Oxygen.  Focused Assessment:  on nc 2l/min saturating 90-93 % but vbg showed respiratory acidosis and carbon narcosis,fullface bipap commenced,lethargic,with  a bit contracted upper extremities.    Treatments and/or interventions provided: iv access,difficult iv line insertion,labs,see MAR.for CT brain and PE study  Patient's response to treatments and/or interventions: tolerated    To be done/followed up on inpatient unit:  as per admission    Does this patient have any cognitive concerns?: Disoriented to time, Disoriented to place, and Disoriented to situation    Activity level - Baseline/Home:  Total Care  Activity Level - Current:   Total Care    Patient's Preferred language: English   Needed?: No    Isolation: None  Infection: Not Applicable  Patient tested for COVID 19 prior to admission: YES  Bariatric?: Yes    Vital Signs:   Vitals:    01/06/25 0632 01/06/25 0642 01/06/25 0652 01/06/25 0702   BP: 134/56   111/54   Pulse: 85 75 86 72   Resp: 16 19 28 22   Temp:       TempSrc:       SpO2: 92% 99% 99% 100%       Cardiac Rhythm:     Was the PSS-3 completed:   No -lethargic  What interventions are required if any?               Family Comments: none  OBS brochure/video discussed/provided to patient/family: No              Name of person given brochure if not patient:               Relationship to patient:     For the majority of the shift this patient's behavior was Green but has tendency to get violent   Behavioral interventions performed were  any procedure explained prior.    ED NURSE PHONE  NUMBER:
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
## 2025-01-06 NOTE — ED NOTES
Pt crying out in room but unable to verbalize what was wrong. Writer repositioned pt which did not seem to help. PRN ativan given.

## 2025-01-06 NOTE — ED TRIAGE NOTES
BIBA from her facility due to altered mental state,moaning around 0400 during the staff rounds,and sats of 66% on oxygen 1l/min and duo neb given x 1 dose.  K/c copd and chf,was discharged yesterday in ED with same complaints.       Triage Assessment (Adult)       Row Name 01/06/25 0517          Triage Assessment    Airway WDL WDL                     
You can access the FollowMyHealth Patient Portal offered by [facility] by registering at the following website: [email or web address] By joining [email or web address]’s FollowMyHealth portal, you will also be able to view your health information using other applications (apps) compatible with our system.

## 2025-01-06 NOTE — ED NOTES
RT commenced fullface bipap for respiratory acidosis and co2 retention.     Sent another sample for potassium and troponin as sample was hemolyzed.    Pure wick applied.    For ct brain and ct pe study-no iv access -hands are a bit contracted.
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
## 2025-01-07 NOTE — CONSULTS
SPIRITUAL HEALTH SERVICES - Consult Note  [facility] MedSurg [facility]    Referral Source/Reason for Visit: family request for spiritual support    Visited with Jessika's sister (Rosalia) and family at bedside. Rosalia is seeking assurance that Jessika was anointed yesterday.     Confirmed that Jessika was anointed by Fr. Christoph Manzanares on 1/6.     Rosalia has been Jessika's primary support and caregiver for many years. 2 of 12 children (4 still living), they share a very close bond.    Provided emotional support to Rosalia as she vigils, affirming her presence and care, and orienting her to  availability throughout the day.    Plan: Please contact as needs arise. Spiritual Health remains available.    Eleanor Dyer  Associate     [facility] available 24/7 for emergent requests/referrals, either by paging the on-call  or by entering an ASAP/STAT consult in Epic (this will also page the on-call ).

## 2025-01-07 NOTE — DISCHARGE SUMMARY
[facility]    Discharge Summary  Hospitalist    Date of Admission:  1/6/2025  Date of Discharge:  1/7/2025 12:55 PM  Discharging Provider: Emily Hickman MD    Discharge Diagnoses   Passed away this admission was on comfort cares.    History of Present Illness   Please review admission history and physical.    Hospital Course   Jessika Garcia was admitted on 1/6/2025.  The following problems were addressed during her hospitalization:    Active Problems:    Elevated troponin    CO2 narcosis    Acute decompensated heart failure (H)    Acute respiratory failure with hypoxia and hypercarbia (H)    Altered mental status, unspecified altered mental status type  Jessika Garcia is a 74 year old female with a significant past medical history for HFrEF, CAD, Chronic hypoxic respiratory failure, and TBI who presented from her nursing home with altered mental status. Upon arrival to ED via EMS, patient was found to have pH of 7.18 and pCO2 of 80 therefore was initiated on BiPAP.  Other labs were remarkable for lactic 5.2, BNP 3083, potassium 5.6, troponin 270>367.  Request for hospitalist to admit for further treatment and evaluation, however review of chart reveals that patient has POLST on file that states comfort measures.  Following admission patient was transitioned to comfort measures and the first kept comfortable, patient passed away on 1/7/2025 from respiratory failure.    Emily Hickman MD    Significant Results and Procedures       Pending Results     Unresulted Labs Ordered in the Past 30 Days of this Admission       Date and Time Order Name Status Description    1/6/2025  5:45 AM Blood Culture Peripheral Blood Preliminary     1/6/2025  5:45 AM Blood Culture Peripheral Blood Preliminary     1/5/2025  9:32 PM Urine Culture Preliminary             Code Status   Comfort Care       Primary Care Physician   Kat Hudson    Physical Exam               SpO2: 94 % O2 Device: Nasal cannula Oxygen  Delivery: 1 LPM  There were no vitals filed for this visit.  Vital Signs with Ranges  SpO2:  [89 %-96 %] 94 %  I/O last 3 completed shifts:  In: -   Out: 200 [Urine:200]    The patient was examined on the day of discharge.    Discharge Disposition   Patient  during this admission  Condition at discharge:     Consultations This Hospital Stay   [facility] INPATIENT HOSPICE ADULT CONSULT  SPIRITUAL HEALTH SERVICES IP CONSULT  CARE MANAGEMENT / SOCIAL WORK IP CONSULT  SPIRITUAL HEALTH SERVICES IP CONSULT    Time Spent on this Encounter   I, Emily Hickman MD, personally saw the patient today and spent less than or equal to 30 minutes discharging this patient.    Discharge Orders   No discharge procedures on file.  Discharge Medications   Discharge Medication List as of 2025 12:56 PM        START taking these medications    Details   naloxone (NARCAN) 4 MG/0.1ML nasal spray Spray 1 spray (4 mg) into one nostril alternating nostrils as needed for opioid reversal. every 2-3 minutes until assistance arrives, No Print Out           CONTINUE these medications which have NOT CHANGED    Details   !! acetaminophen (TYLENOL) 325 MG tablet Take 2 tablets (650 mg) by mouth every 4 hours as needed for mild pain or other (and adjunct with moderate or severe pain or per patient request)., Transitional      !! acetaminophen (TYLENOL) 500 MG tablet Take 1,000 mg by mouth 3 times daily., Historical      allopurinol (ZYLOPRIM) 100 MG tablet Take 200 mg by mouth daily., Historical      amLODIPine (NORVASC) 10 MG tablet Take 10 mg by mouth 2 times daily., Historical      clopidogrel (PLAVIX) 75 MG tablet Take 75 mg by mouth daily., Historical      cyanocobalamin (CYANOCOBALAMIN) 1000 mcg/mL injection Inject 1 mL (1,000 mcg) into the muscle every 7 days., Transitional      glucose 40 % (400 mg/mL) gel Take 15-30 g by mouth every 15 minutes as needed for low blood sugar.Transitional      guaiFENesin (ROBITUSSIN) 20 mg/mL liquid Take  5 mLs by mouth every 4 hours as needed for cough., Historical      HM LIDOCAINE PATCH EX Externally apply 1 patch topically daily. Remove every night, Historical      insulin aspart (NOVOLOG FLEXPEN) 100 UNIT/ML pen Inject subcutaneously 3 times daily (with meals). Sliding scale:  If 250-300 = 2 units; If 301-350 = 4 units; If 351+ = 6 units, Historical      insulin glargine (LANTUS PEN) 100 UNIT/ML pen Inject 15 Units subcutaneously at bedtime., TransitionalIf Lantus is not covered by insurance, may substitute Basaglar or Semglee or other insulin glargine product per insurance preference at same dose and frequency.        ipratropium - albuterol 0.5 mg/2.5 mg/3 mL (DUONEB) 0.5-2.5 (3) MG/3ML neb solution Take 1 vial (3 mLs) by nebulization every 6 hours as needed for wheezing., Transitional      loperamide (IMODIUM) 2 MG capsule Take 2 mg by mouth every 12 hours as needed for diarrhea., Historical      losartan (COZAAR) 50 MG tablet Take 1 tablet (50 mg) by mouth daily., Transitional      melatonin 5 MG tablet Take 1 tablet (5 mg) by mouth at bedtime., Transitional      metFORMIN (GLUCOPHAGE) 500 MG tablet Take 1,000 mg by mouth 2 times daily (with meals)., Historical      metoprolol tartrate 37.5 MG TABS Take 37.5 mg by mouth 2 times daily., Transitional      mirtazapine (REMERON) 7.5 MG tablet Take 1 tablet (7.5 mg) by mouth at bedtime., Transitional      OLANZapine (ZYPREXA) 2.5 MG tablet Take 2.5 mg by mouth at bedtime. X 7days, 1/2/25-1/9/25, Historical      psyllium (METAMUCIL/KONSYL) Packet Take 1 packet by mouth daily., Transitional      senna-docusate (SENOKOT-S/PERICOLACE) 8.6-50 MG tablet Take 1 tablet by mouth 2 times daily., Historical      sertraline (ZOLOFT) 25 MG tablet Take 75 mg by mouth daily., Historical      simvastatin (ZOCOR) 20 MG tablet Take 20 mg by mouth at bedtime., Historical      torsemide (DEMADEX) 20 MG tablet Take 20 mg by mouth daily., Historical       !! - Potential duplicate  medications found. Please discuss with provider.        Allergies   No Known Allergies  Data   Most Recent 3 CBC's:  Recent Labs   Lab Test 01/06/25  0538 01/05/25  1732 12/26/24  0829   WBC 13.3* 8.1 8.3   HGB 12.1 11.0* 10.6*   MCV 97 97 97    297 218      Most Recent 3 BMP's:  Recent Labs   Lab Test 01/06/25  0652 01/06/25  0538 01/05/25  1732 12/30/24  0535   NA  --  143 144 141   POTASSIUM 5.0 5.6* 4.6 4.1   CHLORIDE  --  102 105 99   CO2  --  21* 29 26   BUN  --  31.4* 25.8* 59.4*   CR  --  0.84 0.58 0.93   ANIONGAP  --  20* 10 16*   DOMENICA  --  9.7 9.5 8.9   GLC  --  238* 114* 147*     Most Recent 2 LFT's:  Recent Labs   Lab Test 01/06/25  0538 12/11/24  0600 12/07/24  0954   AST  --  35 34   ALT 18 19 11   ALKPHOS 76 77 84   BILITOTAL 0.2 <0.2 0.3     Most Recent INR's and Anticoagulation Dosing History:  Anticoagulation Dose History           No data to display              Most Recent 3 Troponin's:No lab results found.  Most Recent Cholesterol Panel:No lab results found.  Most Recent 6 Bacteria Isolates From Any Culture (See EPIC Reports for Culture Details):No lab results found.  Most Recent TSH, T4 and A1c Labs:  Recent Labs   Lab Test 12/11/24  0600 11/11/24  0612   TSH 5.50*  --    T4 0.94  --    A1C  --  8.0*     Results for orders placed or performed during the hospital encounter of 01/06/25   CT Head w/o Contrast    Narrative    EXAM: CT HEAD W/O CONTRAST  LOCATION: [facility]  DATE: 1/6/2025    INDICATION: AMS  COMPARISON: Brain MRI 12/12/2000  TECHNIQUE: Routine CT Head without IV contrast. Multiplanar reformats. Dose reduction techniques were used.    FINDINGS:  INTRACRANIAL CONTENTS: No intracranial hemorrhage, extraaxial collection, or mass effect.  No CT evidence of acute infarct. Moderate presumed chronic small vessel ischemic changes. Mild generalized cerebral atrophy scattered chronic infarcts in the left   cerebral hemisphere. Normal ventricles and sulci.      VISUALIZED ORBITS/SINUSES/MASTOIDS: No intraorbital abnormality. No paranasal sinus mucosal disease. Scattered fluid/membrane thickening in the left mastoid air cells. No apparent mass in the posterior nasopharynx or skull base.    BONES/SOFT TISSUES: No acute abnormality.      Impression    IMPRESSION:  1.  No CT evidence for acute intracranial process.  2.  Brain atrophy and presumed chronic microvascular ischemic changes as above.   CT Chest Pulmonary Embolism w Contrast    Narrative    EXAM: CT CHEST PULMONARY EMBOLISM W CONTRAST  LOCATION: [facility]  DATE: 1/6/2025    INDICATION: sob, hypoxia, PE  COMPARISON: None.  TECHNIQUE: CT chest pulmonary angiogram during arterial phase injection of IV contrast. Multiplanar reformats and MIP reconstructions were performed. Dose reduction techniques were used.   CONTRAST: 71mL ISOVUE 370    FINDINGS:  ANGIOGRAM CHEST: Pulmonary arteries are normal caliber and negative for pulmonary emboli. Thoracic aorta is negative for dissection. No CT evidence of right heart strain.    LUNGS AND PLEURA: Moderate bilateral pleural effusions are present with adjacent consolidative opacities. Patchy bilateral groundglass opacities are also seen with interlobular septal thickening. A few pulmonary nodules are present measuring up to 5 mm   in the right upper lobe (series 7, image 150).    MEDIASTINUM/AXILLAE: Heart size appears enlarged. Moderate to severe vascular calcifications are seen in the thoracic aorta. Several prominent mediastinal and hilar lymph nodes are present with one of the largest measuring 10 mm in the subcarinal space   (series 5, image 113). Moderate vascular calcifications are present in the thoracic aorta. Small pericardial effusion.    CORONARY ARTERY CALCIFICATION: Severe.    UPPER ABDOMEN: Thickening is present in the bilateral adrenal glands with nodules measuring up to 1.6 cm bilaterally, indeterminate by density. Partially seen  vascular calcifications in the abdominal aorta and its branches.    MUSCULOSKELETAL: Multilevel degenerative changes are present in the spine. Flowing ossification along the anterior aspect of multiple vertebral bodies is present, suggestive of diffuse idiopathic skeletal hyperostosis      Impression    IMPRESSION:  1.  No evidence of pulmonary embolism is seen to the subsegmental level.  2.  Moderate bilateral pleural effusions with adjacent consolidative opacities, suspected to reflect compressive atelectasis although superimposed pneumonia cannot be excluded.  3.  Patchy bilateral groundglass opacities with interlobular septal thickening. Findings are suggestive of pulmonary edema but atypical pneumonia could also appear similarly and cannot be excluded.  4.  Multiple pulmonary nodules are present measuring up to 5 mm. See follow-up guidelines below.  5.  Bilateral adrenal nodules measuring up to 1.6 cm. Suggest follow-up per guidelines below.  6.  Mediastinal and hilar lymphadenopathy, suspected to be reactive.    REFERENCE:  Guidelines for Management of Incidental Pulmonary Nodules Detected on CT Images: From the Fleischner Society 2017.   Guidelines apply to incidental nodules in patients who are 35 years or older.  Guidelines do not apply to lung cancer screening, patients with immunosuppression, or patients with known primary cancer.    MULTIPLE NODULES  Nodule size <6 mm  Low-risk patients: No follow-up needed.  High-risk patients: Optional follow-up at 12 months.    Nodule size 6 mm or larger  Low-risk patients: Follow-up CT at 3-6 months, then consider CT at 18-24 months.  High-risk patients: Follow-up CT at 3-6 months, then at 18-24 months if no change.  -Use most suspicious nodule as guide to management.    REFERENCE:  Management of Incidental Adrenal Masses: A White Paper of the ACR Incidental Findings Committee. J Am Sugey Radiol 2017;14:1013-7650.    ? 1 cm and < 4 cm:     No prior imaging and no  history of cancer:  1-2 cm: probably benign. Consider 12-month CT adrenal follow-up.  >2cm, <4 cm: Adrenal CT.    No prior imaging and history of cancer: Adrenal CT    Prior Imaging:  Stable for 1 year: Benign. No follow-up.  New or enlarging:  --Adrenal CT or resection if no history of cancer.  --PET/CT or biopsy if positive history of cancer.

## 2025-01-07 NOTE — PLAN OF CARE
Goal Outcome Evaluation:    Orientation: PETEY,   Aggression Stop Light: Green  Activity: A2 lift, T/Rq2  Diet/BS Checks: Reg  Tele:  N/A  IV Access/Drains: 2 R PIV SL  Pain Management: PRN morphine  Abnormal VS/Results: Deferred d/t comfort cares  Bowel/Bladder: Incont b/b. PW in place  Skin/Wounds: Scattered bruising and scabs, blanchable redness to sacrum and heels  Consults: GIP  D/C Disposition: Pending  Other Info:   -1L NC for comfort

## 2025-01-07 NOTE — PROGRESS NOTES
Patient time of death was 1040. Family notified. Belongings (clothing) sent (to [facility]). Autopsy requested (no). Death record completed. Patient sent to [facility] at 1240.

## 2025-01-07 NOTE — CONSULTS
GIP is consulted to see patient. If patient is stable for discharge, patient is from [facility] and could likely return. No further care management intervention anticipated at this time.  Please re-consult if further needs arise.  Care management signing off.        MEET Flores

## 2025-01-07 NOTE — DEATH PRONOUNCEMENT
[facility] DEATH PRONOUNCEMENT    Called to pronounce Jessika Garcia dead.    Physical Exam: Spontaneous respirations absent, Breath sounds absent, Carotid pulse absent, and Heart sounds absent    Patient was pronounced dead at 10:40 AM, 2025.    Preliminary Cause of Death: Acute hypoxic respiratory failure    Active Problems:    Elevated troponin    CO2 narcosis    Acute decompensated heart failure (H)    Acute respiratory failure with hypoxia and hypercarbia (H)    Altered mental status, unspecified altered mental status type       Infectious disease present?: NO    Communicable disease present? (examples: HIV, chicken pox, TB, Ebola, CJD) :  NO    Multi-drug resistant organism present? (example: MRSA): NO    Please consider an autopsy if any of the following exist:  NO Unexpected or unexplained death during or following any dental, medical, or surgical diagnostic treatment procedures.   NO Death of mother at or up to seven days after delivery.     NO All  and pediatric deaths.     NO Death where the cause is sufficiently obscure to delay completion of the death certificate.   NO Deaths in which autopsy would confirm a suspected illness/condition that would affect surviving family members or recipients of transplanted organs.     The following deaths must be reported to the 's Office:  NO A death that may be due entirely or in part to any factors other than natural disease (recent surgery, recent trauma, suspected abuse/neglect).   NO A death that may be an accident, suicide, or homicide.     NO Any sudden, unexpected death in which there is no prior history of significant heart disease or any other condition associated with sudden death.   NO A death under suspicious, unusual, or unexpected circumstances.    NO Any death which is apparently due to natural causes but in which the  does not have a personal physician familiar with the patient s medical history, social, or environmental  situation or the circumstances of the terminal event.   NO Any death apparently due to Sudden Infant Death Syndrome.     NO Deaths that occur during, in association with, or as consequences of a diagnostic, therapeutic, or anesthetic procedure.   NO Any death in which a fracture of a major bone has occurred within the past (6) six months.   NO A death of persons note seen by their physician within 120 days of demise.     NO Any death in which the  was an inmate of a public institution or was in the custody of Law Enforcement personnel.   NO  All unexpected deaths of children   NO Solid organ donors   NO Unidentified bodies   YES Deaths of persons whose bodies are to be cremated or otherwise disposed of so that the bodies will later be unavailable for examination;   NO Deaths unattended by a physician outside of a licensed healthcare facility or licensed residential hospice program   NO Deaths occurring within 24 hours of arrival to a health care facility if death is unexpected.    NO Deaths associated with the decedent s employment.   NO Deaths attributed to acts of terrorism.   NO Any death in which there is uncertainty as to whether it is a medical examiner s care should be discussed with the medical investigator.        Body disposition: Autopsy was discussed with family member:  Sister in person.  Permission for autopsy was declined. Body released to the morgue.    Humza Pham NP  [facility]  Securely message with the Vocera Web Console (learn more here)  Text page via [employer] Paging/Directory

## 2025-01-07 NOTE — PROGRESS NOTES
Summary:    Primary Diagnosis: comfort cares  Orientation: PETEY  Aggression Stop Light: Green (previous violent admission)  Mobility: 2A/lift, turn and repo q2h  Pain Management: PRN morphine x2  Diet: Regular  Bowel/Bladder: Incontinent of b/b, purewick in place  Abnormal Lab/Assessments: deferred for comfort cares  Drain/Device/Wound: Scattered scabs/bruising, blanchable redness to sacrum and heels  Consults: GIP  D/C Day/Goals/Place: TBD    Other:  - 1L NC for comfort  - Admitted to unit at 1800

## 2025-01-08 LAB
BACTERIA UR CULT: ABNORMAL

## 2025-01-09 LAB
BACTERIA BLD CULT: NORMAL
BACTERIA BLD CULT: NORMAL

## 2025-01-11 LAB
BACTERIA BLD CULT: NO GROWTH
BACTERIA BLD CULT: NO GROWTH
